# Patient Record
Sex: MALE | Race: WHITE | Employment: OTHER | URBAN - METROPOLITAN AREA
[De-identification: names, ages, dates, MRNs, and addresses within clinical notes are randomized per-mention and may not be internally consistent; named-entity substitution may affect disease eponyms.]

---

## 2020-04-21 ENCOUNTER — OFFICE VISIT (OUTPATIENT)
Dept: CARDIOLOGY CLINIC | Age: 85
End: 2020-04-21

## 2020-04-21 VITALS
TEMPERATURE: 97.2 F | RESPIRATION RATE: 18 BRPM | HEIGHT: 74 IN | HEART RATE: 119 BPM | OXYGEN SATURATION: 96 % | WEIGHT: 242.6 LBS | BODY MASS INDEX: 31.13 KG/M2 | SYSTOLIC BLOOD PRESSURE: 134 MMHG | DIASTOLIC BLOOD PRESSURE: 87 MMHG

## 2020-04-21 DIAGNOSIS — I48.91 ATRIAL FIBRILLATION, UNSPECIFIED TYPE (HCC): ICD-10-CM

## 2020-04-21 DIAGNOSIS — I25.10 CORONARY ARTERY DISEASE INVOLVING NATIVE CORONARY ARTERY OF NATIVE HEART WITHOUT ANGINA PECTORIS: Primary | ICD-10-CM

## 2020-04-21 DIAGNOSIS — R06.02 SOB (SHORTNESS OF BREATH): ICD-10-CM

## 2020-04-21 RX ORDER — FUROSEMIDE 40 MG/1
TABLET ORAL
COMMUNITY
Start: 2020-04-02 | End: 2021-03-11 | Stop reason: SDUPTHER

## 2020-04-21 RX ORDER — OLMESARTAN MEDOXOMIL 40 MG/1
40 TABLET ORAL DAILY
COMMUNITY
End: 2020-12-03 | Stop reason: SDUPTHER

## 2020-04-21 RX ORDER — ROPINIROLE 1 MG/1
TABLET, FILM COATED ORAL
COMMUNITY
Start: 2020-04-02

## 2020-04-21 RX ORDER — METOPROLOL SUCCINATE 25 MG/1
25 TABLET, EXTENDED RELEASE ORAL DAILY
Qty: 90 TAB | Refills: 3 | Status: SHIPPED | OUTPATIENT
Start: 2020-04-21 | End: 2020-04-30 | Stop reason: DRUGHIGH

## 2020-04-21 RX ORDER — AMLODIPINE BESYLATE 2.5 MG/1
5 TABLET ORAL
COMMUNITY
Start: 2020-04-09 | End: 2020-04-30 | Stop reason: ALTCHOICE

## 2020-04-21 NOTE — PROGRESS NOTES
[facility]    Mr. 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 Patient has recently relocated in this area from [facility]. He was diagnosed with atrial fibrillation in 2018. He had cardioversion twice in the past.  Last one was in 2019. He also had been diagnosed with amiodarone related lung toxicity and vision problem. For last few weeks patient has been complaining of worsening shortness of breath. He can tell that he is in atrial fibrillation because of blood pressure monitoring cuff. His heart rate has been going anywhere from 80 up to 139 bpm.  He occasionally feels palpitation. He denies any chest pain or chest tightness. He is using 2 pillows at night. He takes Benicar. He takes amlodipine as needed. He denies presyncope or syncope  Denies any nausea, vomiting, abdominal pain, fever, chills, sputum production. No hematuria or other bleeding complaints    Past Medical History:   Diagnosis Date    A-fib [facility])     S/P Cardioversion X 2 (2018. 2019)    Amiodarone pulmonary toxicity 2019    COPD (chronic obstructive pulmonary disease) (HCC)     GERD (gastroesophageal reflux disease)     HTN (hypertension)          No past surgical history on file. Current Outpatient Medications   Medication Sig    amLODIPine (NORVASC) 2.5 mg tablet TAKE 1 TABLET EVERY DAY    furosemide (LASIX) 40 mg tablet TAKE 1 TABLET BY MOUTH EVERY DAY    rOPINIRole (REQUIP) 1 mg tablet TAKE 1 TABLET BY MOUTH EVERY DAY     No current facility-administered medications for this visit. Allergies and Sensitivities:  Not on File    Family History:  No family history on file.     Social History:  Social History     Tobacco Use    Smoking status: Not on file   Substance Use Topics    Alcohol use: Not on file    Drug use: Not on file     He  has no history on file for tobacco.  He  has no history on file for alcohol. Review of Systems:  Cardiac symptoms as noted above in HPI. All others negative. Denies fatigue, malaise, skin rash, joint pain, blurring vision, photophobia, neck pain, hemoptysis, chronic cough, nausea, vomiting, hematuria, burning micturition, BRBPR, chronic headaches. Physical Exam:  BP Readings from Last 3 Encounters:   04/21/20 134/87         Pulse Readings from Last 3 Encounters:   04/21/20 (!) 119          Wt Readings from Last 3 Encounters:   04/21/20 242 lb 9.6 oz (110 kg)       Constitutional: Oriented to person, place, and time. HENT: Head: Normocephalic and atraumatic. Eyes: Conjunctivae and extraocular motions are normal.   Neck: No JVD present. Carotid bruit is not appreciated. Cardiovascular:  Irregular rhythm. No murmur, gallop or rubs appreciated  Lung: Breath sounds normal. No respiratory distress. No ronchi or rales appreciated  Abdominal: No tenderness. No rebound and no guarding. Musculoskeletal: There is no lower extremity edema. No cynosis  Lymphadenopathy:  No cervical or supraclavicular adenopathy appriciated. Neurological: No gross motor deficit noted. Skin: No visible skin rash noted. No Ear discharge noted  Psychiatric: Normal mood and affect. Good distal pulse      Review of Data  LABS:   No results found for: NA, K, CL, CO2, GLU, BUN, CREA  No flowsheet data found. No results found for: GPT, ALT  No results found for: HBA1C, HGBE8, GNQ0LPIU, GDV5HWTI    EKG  Atrial fibrillation with rapid ventricular response at heart rate of 120 bpm.  Left bundle branch block    ECHO    STRESS TEST (EST, PHARM, NUC, ECHO etc)    CATHETERIZATION    IMPRESSION & PLAN:  Mr. Jhonathan Mcwilliams is 89-year male with multiple medical problem    Atrial fibrillation:  Paroxysmal in nature according to patient. Initially diagnosed in 2018.   Had cardioversion successfully in 2018 and again in 2019. According to patient and the wife, likely back in atrial fibrillation with variable heart rate between 801 30 for last to 3 weeks. Now with dyspnea  Today heart rate is 120 bpm.  No significant fluid overload on exam  I recommended patient and the wife that they should go to emergency department with IV AV tae blocking agent for heart rate control. Despite recommendation, there currently afraid to go to hospital because of COVID-19 situation. Alternatively I have suggested to start taking Toprol-XL 25 mg daily first dose tonight. If heart rate does not control or if symptoms gets worse, they were advised to go to emergency department  Starting Toprol-XL 25 mg now. Echo to rule out tachycardia induced cardiomyopathy  Will avoid amiodarone or Multaq as patient has been diagnosed with amiodarone induced lung toxicity in 2019  We will try to obtain records from cardiology team in [place] [id number]    Hypertension:  Currently on Benicar. Takes amlodipine only as needed. Was on hydralazine in the past as well as clonidine however have advised patient to stop that. We will stop amlodipine as well  Starting Toprol-XL 25 mg daily  Keep checking blood pressure at home    COPD, emphysema:  Defer to Dr. Tom Irbahim. Also has amiodarone induced lung toxicity in 2019. Defer to Dr. Tom Ibrahim    This plan was discussed with patient who is in agreement. Thank you for allowing me to participate in patient care. Please feel free to call me if you have any question or concern. Adriana Echevarria MD  Please note: This document has been produced using voice recognition software. Unrecognized errors in transcription may be present.

## 2020-04-21 NOTE — PATIENT INSTRUCTIONS
Testing Echo Please call central scheduling at [phone] All testing/lab work is completed at [facility] [address] **Call office three days after testing for results** Start Toporl xl 25 mg daily

## 2020-04-24 ENCOUNTER — HOSPITAL ENCOUNTER (OUTPATIENT)
Dept: NON INVASIVE DIAGNOSTICS | Age: 85
Discharge: HOME | End: 2020-04-24
Attending: INTERNAL MEDICINE
Payer: MEDICARE

## 2020-04-24 ENCOUNTER — HOSPITAL ENCOUNTER (OUTPATIENT)
Dept: LAB | Age: 85
Discharge: HOME | End: 2020-04-24

## 2020-04-24 ENCOUNTER — HOSPITAL ENCOUNTER (OUTPATIENT)
Dept: CT IMAGING | Age: 85
Discharge: HOME | End: 2020-04-24
Attending: INTERNAL MEDICINE
Payer: MEDICARE

## 2020-04-24 VITALS
DIASTOLIC BLOOD PRESSURE: 87 MMHG | WEIGHT: 242 LBS | BODY MASS INDEX: 31.06 KG/M2 | SYSTOLIC BLOOD PRESSURE: 134 MMHG | HEIGHT: 74 IN

## 2020-04-24 DIAGNOSIS — R06.02 SOB (SHORTNESS OF BREATH): ICD-10-CM

## 2020-04-24 DIAGNOSIS — J84.10 PULMONARY FIBROSIS (HCC): ICD-10-CM

## 2020-04-24 DIAGNOSIS — I48.91 ATRIAL FIBRILLATION, UNSPECIFIED TYPE (HCC): ICD-10-CM

## 2020-04-24 LAB
AV VELOCITY RATIO: 0.4
AV VTI RATIO: 0.4
ECHO AO ASC DIAM: 3.4 CM
ECHO AO ROOT DIAM: 3.43 CM
ECHO AV AREA PEAK VELOCITY: 1.5 CM2
ECHO AV AREA VTI: 1.4 CM2
ECHO AV AREA/BSA PEAK VELOCITY: 0.6 CM2/M2
ECHO AV AREA/BSA VTI: 0.6 CM2/M2
ECHO AV MEAN GRADIENT: 7.2 MMHG
ECHO AV MEAN VELOCITY: 1.27 M/S
ECHO AV PEAK GRADIENT: 11.8 MMHG
ECHO AV PEAK VELOCITY: 171.83 CM/S
ECHO AV VTI: 39.57 CM
ECHO IVC SNIFF: 2.39 CM
ECHO LA MAJOR AXIS: 4.41 CM
ECHO LA TO AORTIC ROOT RATIO: 1.29
ECHO LV EDV A2C: 167.9 ML
ECHO LV EDV A4C: 144.6 ML
ECHO LV EDV BP: 158.2 ML (ref 67–155)
ECHO LV EDV INDEX A4C: 61.4 ML/M2
ECHO LV EDV INDEX BP: 67.1 ML/M2
ECHO LV EDV NDEX A2C: 71.2 ML/M2
ECHO LV EDV TEICHHOLZ: 0.8 ML
ECHO LV EJECTION FRACTION A2C: 27 %
ECHO LV EJECTION FRACTION A4C: 45 %
ECHO LV EJECTION FRACTION BIPLANE: 35.9 % (ref 55–100)
ECHO LV ESV A2C: 123.1 ML
ECHO LV ESV A4C: 79.3 ML
ECHO LV ESV BP: 101.3 ML (ref 22–58)
ECHO LV ESV INDEX A2C: 52.2 ML/M2
ECHO LV ESV INDEX A4C: 33.7 ML/M2
ECHO LV ESV INDEX BP: 43 ML/M2
ECHO LV ESV TEICHHOLZ: 0.41 ML
ECHO LV INTERNAL DIMENSION DIASTOLIC: 5.54 CM (ref 4.2–5.9)
ECHO LV INTERNAL DIMENSION SYSTOLIC: 4.17 CM
ECHO LV IVSD: 0.97 CM (ref 0.6–1)
ECHO LV MASS 2D: 220.8 G (ref 88–224)
ECHO LV MASS INDEX 2D: 93.7 G/M2 (ref 49–115)
ECHO LV POSTERIOR WALL DIASTOLIC: 0.83 CM (ref 0.6–1)
ECHO LVOT CARDIAC OUTPUT: 10.4 L/MIN
ECHO LVOT DIAM: 2.19 CM
ECHO LVOT PEAK GRADIENT: 1.9 MMHG
ECHO LVOT PEAK VELOCITY: 69.28 CM/S
ECHO LVOT SV: 54.2 ML
ECHO LVOT VTI: 14.36 CM
ECHO RA MINOR AXIS: 4.09 CM
ECHO TV REGURGITANT MAX VELOCITY: 318 CM/S
ECHO TV REGURGITANT PEAK GRADIENT: 40.6 MMHG
LVFS 2D: 24.75 %
LVOT MG: 1.18 MMHG
LVOT MV: 0.51 CM/S
LVSV (MOD BI): 23.75 ML
LVSV (MOD SINGLE 4C): 27.26 ML
LVSV (MOD SINGLE): 18.73 ML
LVSV (TEICH): 30.35 ML
SENTARA SPECIMEN COL,SENBCF: NORMAL

## 2020-04-24 PROCEDURE — 93306 TTE W/DOPPLER COMPLETE: CPT

## 2020-04-24 PROCEDURE — 71250 CT THORAX DX C-: CPT

## 2020-04-24 PROCEDURE — 99001 SPECIMEN HANDLING PT-LAB: CPT

## 2020-04-28 ENCOUNTER — TELEPHONE (OUTPATIENT)
Dept: CARDIOLOGY CLINIC | Age: 85
End: 2020-04-28

## 2020-04-28 NOTE — TELEPHONE ENCOUNTER
----- Message from Caitlyn Chao MD sent at 4/24/2020 11:30 AM EDT -----  Abnormal echo    Needs follow up coming week    Thanks

## 2020-04-28 NOTE — TELEPHONE ENCOUNTER
Contacted pt at [facility] number. Two patient Identifiers confirmed. Advised pt per Dr Job Chaudhry,  Pt verbalized understanding and scheduled for 4/30/2020. [General Appearance - Alert] : alert [General Appearance - In No Acute Distress] : in no acute distress [Auscultation Breath Sounds / Voice Sounds] : lungs were clear to auscultation bilaterally [Heart Sounds] : normal S1 and S2 [Heart Rate And Rhythm] : heart rate was normal and rhythm regular [Heart Sounds Gallop] : no gallops [Murmurs] : no murmurs [Heart Sounds Pericardial Friction Rub] : no pericardial rub [Bowel Sounds] : normal bowel sounds [Abdomen Soft] : soft [] : no hepato-splenomegaly [Abdomen Tenderness] : non-tender [Abdomen Mass (___ Cm)] : no abdominal mass palpated [Sclera] : the sclera and conjunctiva were normal [FreeTextEntry1] : ~3-4 inch area of erythema noted over LLE below knee, no scaling or vesicles visible, no scabs

## 2020-04-30 ENCOUNTER — OFFICE VISIT (OUTPATIENT)
Dept: CARDIOLOGY CLINIC | Age: 85
End: 2020-04-30

## 2020-04-30 VITALS
WEIGHT: 240.4 LBS | SYSTOLIC BLOOD PRESSURE: 130 MMHG | DIASTOLIC BLOOD PRESSURE: 78 MMHG | TEMPERATURE: 97.7 F | OXYGEN SATURATION: 97 % | HEIGHT: 74 IN | HEART RATE: 83 BPM | RESPIRATION RATE: 16 BRPM | BODY MASS INDEX: 30.85 KG/M2

## 2020-04-30 DIAGNOSIS — I50.9 CONGESTIVE HEART FAILURE, UNSPECIFIED HF CHRONICITY, UNSPECIFIED HEART FAILURE TYPE (HCC): Primary | ICD-10-CM

## 2020-04-30 RX ORDER — SPIRONOLACTONE 25 MG/1
TABLET ORAL
COMMUNITY
End: 2021-11-18

## 2020-04-30 RX ORDER — CHOLECALCIFEROL (VITAMIN D3) 125 MCG
200 CAPSULE ORAL DAILY
COMMUNITY

## 2020-04-30 RX ORDER — METOPROLOL SUCCINATE 25 MG/1
37.5 TABLET, EXTENDED RELEASE ORAL DAILY
Qty: 90 TAB | Refills: 5 | Status: SHIPPED | OUTPATIENT
Start: 2020-04-30 | End: 2020-05-13

## 2020-04-30 NOTE — PROGRESS NOTES
[facility]    Mr. Tahmina Escalante is 80 y.o. 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 He is feeling much better since last time. He is currently taking Toprol 25 mg twice daily. This was increased by PCP after my visit last time. He is also seeing pulmonary physician and has been scheduled for bronchoscopy because of abnormal CT chest finding. He denies any chest pain or chest tightness he denies presyncope or syncope. His heart rate control has improved significantly. Majority of the time his heart rate is less than 90. Sometimes it will go higher with exertion. He is asymptomatic. He has shortness of breath which has improved since last time  Denies any nausea, vomiting, abdominal pain, fever, chills, sputum production. No hematuria or other bleeding complaints    Past Medical History:   Diagnosis Date    A-fib [facility])     S/P Cardioversion X 2 (2018. 2019)    Amiodarone pulmonary toxicity 2019    COPD (chronic obstructive pulmonary disease) (HCC)     GERD (gastroesophageal reflux disease)     HTN (hypertension)          No past surgical history on file. Current Outpatient Medications   Medication Sig    co-enzyme Q-10 (CoQ-10) 100 mg capsule Take 200 mg by mouth daily.  furosemide (LASIX) 40 mg tablet TAKE 1 TABLET BY MOUTH EVERY DAY    rOPINIRole (REQUIP) 1 mg tablet TAKE 1 TABLET BY MOUTH EVERY DAY    olmesartan (Benicar) 20 mg tablet Take 20 mg by mouth daily.  metoprolol succinate (TOPROL-XL) 25 mg XL tablet Take 1 Tab by mouth daily. (Patient taking differently: Take 25 mg by mouth two (2) times a day.)    apixaban (Eliquis) 5 mg tablet Take 5 mg by mouth two (2) times a day.  amLODIPine (NORVASC) 2.5 mg tablet 5 mg daily as needed.      No current facility-administered medications for this visit. Allergies and Sensitivities:  Not on File    Family History:  No family history on file. Social History:  Social History     Tobacco Use    Smoking status: Not on file   Substance Use Topics    Alcohol use: Not on file    Drug use: Not on file     He  has no history on file for tobacco.  He  has no history on file for alcohol. Review of Systems:  Cardiac symptoms as noted above in HPI. All others negative. Denies fatigue, malaise, skin rash, joint pain, blurring vision, photophobia, neck pain, hemoptysis, chronic cough, nausea, vomiting, hematuria, burning micturition, BRBPR, chronic headaches. Physical Exam:  BP Readings from Last 3 Encounters:   04/30/20 130/78   04/24/20 134/87   04/21/20 134/87         Pulse Readings from Last 3 Encounters:   04/30/20 83   04/21/20 (!) 119          Wt Readings from Last 3 Encounters:   04/30/20 240 lb 6.4 oz (109 kg)   04/24/20 242 lb (109.8 kg)   04/21/20 242 lb 9.6 oz (110 kg)       Constitutional: Oriented to person, place, and time. HENT: Head: Normocephalic and atraumatic. Neck: No JVD present. Carotid bruit is not appreciated. Cardiovascular:  Irregular rhythm. No murmur, gallop or rubs appreciated  Lung: Breath sounds normal. No respiratory distress. No ronchi or rales appreciated  Abdominal: No tenderness. No rebound and no guarding. Musculoskeletal: There is no lower extremity edema. No cynosis    Review of Data  LABS:   No results found for: NA, K, CL, CO2, GLU, BUN, CREA  No flowsheet data found. No results found for: GPT, ALT  No results found for: HBA1C, HGBE8, DHH9YVIE, EKT0BWJP, UPM2MCEK    EKG  Atrial fibrillation with rapid ventricular response at heart rate of 120 bpm.  Left bundle branch block    ECHO (04/20)  Left Ventricle Normal cavity size and wall thickness. Moderate-to-severe systolic dysfunction. The calculated ejection fraction is 30%. Visually measured ejection fraction.  LV wall motion is noted to be hypokinetic. Atrial fibrillation observed. Wall Scoring The left ventricular wall motion is globally hypokinetic. Left Atrium Moderately dilated left atrium. Left Atrium volume index is 45.35 mL/m2. Right Ventricle Mildly dilated right ventricle. Borderline low systolic function. Right Atrium Dilated right atrium. Aortic Valve Cannot rule out bicuspid aortic valve. There is leaflet calcification. Aortic valve mean gradient is 10 mmHg. Aortic valve area is 1.5 cm2. There is mild aortic stenosis. Trace aortic valve regurgitation. Mitral Valve No stenosis. Mitral valve non-specific thickening. Mild mitral annular calcification. Mild regurgitation. Tricuspid Valve No stenosis. Non-specific thickening. Mild regurgitation. Pulmonic Valve Pulmonic valve not well visualized. No stenosis. Mild regurgitation. Aorta Normal aortic root and ascending aorta. Pulmonary Artery Pulmonary arterial systolic pressure (PASP) is 49 mmHg. Pulmonary hypertension found to be mild to moderate. STRESS TEST (EST, PHARM, NUC, ECHO etc)    CATHETERIZATION    IMPRESSION & PLAN:  Mr. Jeffrey Arias is 89-year male with multiple medical problem    Cardiomyopathy:  LVEF 30% by echo in April 2020. Most likely from A. fib and tachycardia. His dose of Toprol has been increased to 25 mg twice daily recently by PCP. Heart rate is better today. I will increase Toprol further to 37.5 mg twice daily. They check blood pressure and heart rate at home regularly  If despite better control of heart rate, EF has not improved, rhythm control strategy will be considered. Currently he does not report any anginal symptoms  Currently he is on spironolactone, unknown dose and also Lasix once a day. He will go home and call us back    Atrial fibrillation:  Paroxysmal in nature according to patient. Initially diagnosed in 2018. Had cardioversion successfully in 2018 and again in 2019.   Started on Toprol XL on last visit and dose was increased by PCP to twice a day. Today increasing further dose to 37.5 mg twice daily  Overall heart rate is better  Today heart rate is 83 bpm  I will increase Toprol further to 37.5 mg twice daily. They check blood pressure and heart rate at home regularly  If despite better control of heart rate, EF has not improved, rhythm control strategy will be considered. Currently he does not report any anginal symptoms    Hypertension:  Have advised patient to avoid taking amlodipine and clonidine as needed  Increasing Toprol as mentioned above. Continue Benicar    COPD, emphysema:  Defer to Dr. Tru Mckeon. Also has amiodarone induced lung toxicity in 2019. Defer to Dr. Tru Mckeon  He is planning to undergo bronchoscopy per Dr. Tru Mckeon for abnormal CT finding. This plan was discussed with patient who is in agreement. Thank you for allowing me to participate in patient care. Please feel free to call me if you have any question or concern. Fidel Doyle MD  Please note: This document has been produced using voice recognition software. Unrecognized errors in transcription may be present.

## 2020-04-30 NOTE — PATIENT INSTRUCTIONS
Testing Echo- to be done [facility] July Please call central scheduling at [phone] All testing/lab work is completed at [facility]/[address] Medication Changes Start taking Toprol 1.5 Tab twice a day Call office with dosage of Aldactone Stop Norvasc

## 2020-05-04 ENCOUNTER — TELEPHONE (OUTPATIENT)
Dept: CARDIOLOGY CLINIC | Age: 85
End: 2020-05-04

## 2020-05-04 NOTE — TELEPHONE ENCOUNTER
Patient's wife, Sarai Akers, called and requested to speak with clinical staff. She was told to give the office a call.

## 2020-05-04 NOTE — TELEPHONE ENCOUNTER
Contacted pt at [facility] number. Two patient Identifiers confirmed. Advised pt per Dr Xiomara Muhammad notes. Pt verbalized understanding and scheduled for 05/13/2020. Pts wife wanted to know if pt should be on fl restrictions. Advised I would consult with Dr Xiomara Muhammad tomorrow an advise.

## 2020-05-05 RX ORDER — CHOLECALCIFEROL (VITAMIN D3) 125 MCG
CAPSULE ORAL
COMMUNITY

## 2020-05-05 RX ORDER — BUDESONIDE AND FORMOTEROL FUMARATE DIHYDRATE 160; 4.5 UG/1; UG/1
2 AEROSOL RESPIRATORY (INHALATION) 2 TIMES DAILY
COMMUNITY
Start: 2020-04-20 | End: 2020-07-15 | Stop reason: ALTCHOICE

## 2020-05-05 RX ORDER — REVEFENACIN 175 UG/3ML
175 SOLUTION RESPIRATORY (INHALATION) DAILY
COMMUNITY

## 2020-05-05 NOTE — PERIOP NOTES
PAT - SURGICAL PRE-ADMISSION INSTRUCTIONS    NAME:  Carmen Wylie. TODAY'S DATE:  5/5/2020    SURGERY DATE:  5/7/2020                                  SURGERY ARRIVAL TIME:   0600 PER OFFICE    1. Do NOT eat or drink anything, including candy or gum, after MIDNIGHT on 5/6/20 , unless you have specific instructions from your Surgeon or Anesthesia Provider to do so. 2. No smoking on the day of surgery. 3. No alcohol 24 hours prior to the day of surgery. 4. No recreational drugs for one week prior to the day of surgery. 5. Leave all valuables, including money/purse, at home. 6. Remove all jewelry, nail polish, makeup (including mascara); no lotions, powders, deodorant, or perfume/cologne/after shave. 7. Glasses/Contact lenses and Dentures may be worn to the hospital.  They will be removed prior to surgery. 8. Call your doctor if symptoms of a cold or illness develop within 24 ours prior to surgery. 9. AN ADULT MUST DRIVE YOU HOME AFTER OUTPATIENT SURGERY. 10. If you are having an OUTPATIENT procedure, please make arrangements for a responsible adult to be with you for 24 hours after your surgery. 11. If you are admitted to the hospital, you will be assigned to a bed after surgery is complete. Normally a family member will not be able to see you until you are in your assigned bed. 15. Family is encouraged to accompany you to the hospital.  We ask visitors in the treatment area to be limited to ONE person at a time to ensure patient privacy. EXCEPTIONS WILL BE MADE AS NEEDED. 15. Children under 12 are discouraged from entering the treatment area and need to be supervised by an adult when in the waiting room. Special Instructions:     Take these medications the morning of surgery with a sip of water:  METOPROLOL, INHALERS, STOP anticoagulants AT LEAST 1 WEEK PRIOR to your surgery or, follow other MD instructions:  LEILA Rivera Prep:    shower with anti-bacterial soap    These surgical instructions were reviewed with PATIENT'S WIFE during the PAT PHONE CALL. Directions: On the morning of surgery, please go to the [address] [facility]. Enter the building from the [facility] entrance, 1st floor (next to the Emergency Room entrance). Take the elevator to the 2nd floor. Sign in at the Registration Desk.     If you have any questions and/or concerns, please do not hesitate to call:  (Prior to the day of surgery)  \[facility] [place] [facility]\"" unit:  [phone]  (Day of surgery)  [facility] unit:  [phone]

## 2020-05-05 NOTE — TELEPHONE ENCOUNTER
Contacted pts wife  at [facility] number. Two patient Identifiers confirmed. Advised pt per Dr Job Chaudhry. Pts wife verbalized understanding.

## 2020-05-06 ENCOUNTER — ANESTHESIA EVENT (OUTPATIENT)
Dept: ENDOSCOPY | Age: 85
End: 2020-05-06
Payer: MEDICARE

## 2020-05-07 ENCOUNTER — APPOINTMENT (OUTPATIENT)
Dept: GENERAL RADIOLOGY | Age: 85
End: 2020-05-07
Attending: INTERNAL MEDICINE
Payer: MEDICARE

## 2020-05-07 ENCOUNTER — ANESTHESIA (OUTPATIENT)
Dept: ENDOSCOPY | Age: 85
End: 2020-05-07
Payer: MEDICARE

## 2020-05-07 ENCOUNTER — HOSPITAL ENCOUNTER (OUTPATIENT)
Age: 85
Setting detail: OUTPATIENT SURGERY
Discharge: HOME | End: 2020-05-07
Attending: INTERNAL MEDICINE | Admitting: INTERNAL MEDICINE
Payer: MEDICARE

## 2020-05-07 VITALS
OXYGEN SATURATION: 93 % | RESPIRATION RATE: 16 BRPM | SYSTOLIC BLOOD PRESSURE: 115 MMHG | BODY MASS INDEX: 31.68 KG/M2 | HEIGHT: 73 IN | DIASTOLIC BLOOD PRESSURE: 57 MMHG | WEIGHT: 239 LBS | HEART RATE: 67 BPM | TEMPERATURE: 97.8 F

## 2020-05-07 PROCEDURE — 87116 MYCOBACTERIA CULTURE: CPT

## 2020-05-07 PROCEDURE — 77030021678 HC GLIDESCP STAT DISP VERT -B: Performed by: NURSE ANESTHETIST, CERTIFIED REGISTERED

## 2020-05-07 PROCEDURE — 88112 CYTOPATH CELL ENHANCE TECH: CPT

## 2020-05-07 PROCEDURE — 77030008477 HC STYL SATN SLP COVD -A: Performed by: NURSE ANESTHETIST, CERTIFIED REGISTERED

## 2020-05-07 PROCEDURE — 77030008680 HC TU ET BRONCH COOK -C: Performed by: INTERNAL MEDICINE

## 2020-05-07 PROCEDURE — 71046 X-RAY EXAM CHEST 2 VIEWS: CPT

## 2020-05-07 PROCEDURE — 77030008683 HC TU ET CUF COVD -A: Performed by: NURSE ANESTHETIST, CERTIFIED REGISTERED

## 2020-05-07 PROCEDURE — 87102 FUNGUS ISOLATION CULTURE: CPT

## 2020-05-07 PROCEDURE — 77030012699 HC VLV SUC CNTRL OCOA -A: Performed by: INTERNAL MEDICINE

## 2020-05-07 PROCEDURE — 74011000250 HC RX REV CODE- 250: Performed by: NURSE ANESTHETIST, CERTIFIED REGISTERED

## 2020-05-07 PROCEDURE — 74011250636 HC RX REV CODE- 250/636: Performed by: NURSE ANESTHETIST, CERTIFIED REGISTERED

## 2020-05-07 PROCEDURE — 88305 TISSUE EXAM BY PATHOLOGIST: CPT

## 2020-05-07 PROCEDURE — 77030018712 HC DEV BLLN INFL BSC -B: Performed by: INTERNAL MEDICINE

## 2020-05-07 PROCEDURE — 77030018836 HC SOL IRR NACL ICUM -A: Performed by: INTERNAL MEDICINE

## 2020-05-07 PROCEDURE — 76040000007: Performed by: INTERNAL MEDICINE

## 2020-05-07 PROCEDURE — 76210000006 HC OR PH I REC 0.5 TO 1 HR: Performed by: INTERNAL MEDICINE

## 2020-05-07 PROCEDURE — 87070 CULTURE OTHR SPECIMN AEROBIC: CPT

## 2020-05-07 PROCEDURE — 76060000032 HC ANESTHESIA 0.5 TO 1 HR: Performed by: INTERNAL MEDICINE

## 2020-05-07 RX ORDER — DEXAMETHASONE SODIUM PHOSPHATE 4 MG/ML
INJECTION, SOLUTION INTRA-ARTICULAR; INTRALESIONAL; INTRAMUSCULAR; INTRAVENOUS; SOFT TISSUE AS NEEDED
Status: DISCONTINUED | OUTPATIENT
Start: 2020-05-07 | End: 2020-05-07 | Stop reason: HOSPADM

## 2020-05-07 RX ORDER — LIDOCAINE HYDROCHLORIDE 10 MG/ML
50 INJECTION INFILTRATION; PERINEURAL ONCE
Status: CANCELLED | OUTPATIENT
Start: 2020-05-07 | End: 2020-05-07

## 2020-05-07 RX ORDER — SODIUM CHLORIDE 0.9 % (FLUSH) 0.9 %
5-40 SYRINGE (ML) INJECTION AS NEEDED
Status: DISCONTINUED | OUTPATIENT
Start: 2020-05-07 | End: 2020-05-07 | Stop reason: HOSPADM

## 2020-05-07 RX ORDER — ONDANSETRON 2 MG/ML
4 INJECTION INTRAMUSCULAR; INTRAVENOUS
Status: DISCONTINUED | OUTPATIENT
Start: 2020-05-07 | End: 2020-05-07 | Stop reason: HOSPADM

## 2020-05-07 RX ORDER — SODIUM CHLORIDE 0.9 % (FLUSH) 0.9 %
5-40 SYRINGE (ML) INJECTION AS NEEDED
Status: CANCELLED | OUTPATIENT
Start: 2020-05-07

## 2020-05-07 RX ORDER — SODIUM CHLORIDE 0.9 % (FLUSH) 0.9 %
5-40 SYRINGE (ML) INJECTION EVERY 8 HOURS
Status: CANCELLED | OUTPATIENT
Start: 2020-05-07

## 2020-05-07 RX ORDER — VECURONIUM BROMIDE FOR INJECTION 1 MG/ML
INJECTION, POWDER, LYOPHILIZED, FOR SOLUTION INTRAVENOUS AS NEEDED
Status: DISCONTINUED | OUTPATIENT
Start: 2020-05-07 | End: 2020-05-07 | Stop reason: HOSPADM

## 2020-05-07 RX ORDER — FENTANYL CITRATE 50 UG/ML
25 INJECTION, SOLUTION INTRAMUSCULAR; INTRAVENOUS
Status: DISCONTINUED | OUTPATIENT
Start: 2020-05-07 | End: 2020-05-07 | Stop reason: HOSPADM

## 2020-05-07 RX ORDER — FENTANYL CITRATE 50 UG/ML
INJECTION, SOLUTION INTRAMUSCULAR; INTRAVENOUS AS NEEDED
Status: DISCONTINUED | OUTPATIENT
Start: 2020-05-07 | End: 2020-05-07 | Stop reason: HOSPADM

## 2020-05-07 RX ORDER — SUCCINYLCHOLINE CHLORIDE 100 MG/5ML
SYRINGE (ML) INTRAVENOUS AS NEEDED
Status: DISCONTINUED | OUTPATIENT
Start: 2020-05-07 | End: 2020-05-07 | Stop reason: HOSPADM

## 2020-05-07 RX ORDER — SODIUM CHLORIDE 0.9 % (FLUSH) 0.9 %
5-40 SYRINGE (ML) INJECTION EVERY 8 HOURS
Status: DISCONTINUED | OUTPATIENT
Start: 2020-05-07 | End: 2020-05-07 | Stop reason: HOSPADM

## 2020-05-07 RX ORDER — LIDOCAINE HYDROCHLORIDE 20 MG/ML
INJECTION, SOLUTION EPIDURAL; INFILTRATION; INTRACAUDAL; PERINEURAL AS NEEDED
Status: DISCONTINUED | OUTPATIENT
Start: 2020-05-07 | End: 2020-05-07 | Stop reason: HOSPADM

## 2020-05-07 RX ORDER — ONDANSETRON 2 MG/ML
INJECTION INTRAMUSCULAR; INTRAVENOUS AS NEEDED
Status: DISCONTINUED | OUTPATIENT
Start: 2020-05-07 | End: 2020-05-07 | Stop reason: HOSPADM

## 2020-05-07 RX ORDER — SODIUM CHLORIDE 9 MG/ML
25 INJECTION, SOLUTION INTRAVENOUS CONTINUOUS
Status: CANCELLED | OUTPATIENT
Start: 2020-05-07

## 2020-05-07 RX ORDER — FENTANYL CITRATE 50 UG/ML
50 INJECTION, SOLUTION INTRAMUSCULAR; INTRAVENOUS
Status: DISCONTINUED | OUTPATIENT
Start: 2020-05-07 | End: 2020-05-07 | Stop reason: HOSPADM

## 2020-05-07 RX ORDER — FAMOTIDINE 20 MG/1
20 TABLET, FILM COATED ORAL ONCE
Status: DISCONTINUED | OUTPATIENT
Start: 2020-05-08 | End: 2020-05-07 | Stop reason: HOSPADM

## 2020-05-07 RX ORDER — PROPOFOL 10 MG/ML
INJECTION, EMULSION INTRAVENOUS AS NEEDED
Status: DISCONTINUED | OUTPATIENT
Start: 2020-05-07 | End: 2020-05-07 | Stop reason: HOSPADM

## 2020-05-07 RX ORDER — SODIUM CHLORIDE, SODIUM LACTATE, POTASSIUM CHLORIDE, CALCIUM CHLORIDE 600; 310; 30; 20 MG/100ML; MG/100ML; MG/100ML; MG/100ML
50 INJECTION, SOLUTION INTRAVENOUS CONTINUOUS
Status: DISCONTINUED | OUTPATIENT
Start: 2020-05-08 | End: 2020-05-07 | Stop reason: HOSPADM

## 2020-05-07 RX ORDER — LIDOCAINE HYDROCHLORIDE 20 MG/ML
JELLY TOPICAL ONCE
Status: CANCELLED | OUTPATIENT
Start: 2020-05-07 | End: 2020-05-07

## 2020-05-07 RX ORDER — SODIUM CHLORIDE, SODIUM LACTATE, POTASSIUM CHLORIDE, CALCIUM CHLORIDE 600; 310; 30; 20 MG/100ML; MG/100ML; MG/100ML; MG/100ML
100 INJECTION, SOLUTION INTRAVENOUS CONTINUOUS
Status: DISCONTINUED | OUTPATIENT
Start: 2020-05-07 | End: 2020-05-07 | Stop reason: HOSPADM

## 2020-05-07 RX ORDER — NALOXONE HYDROCHLORIDE 0.4 MG/ML
0.2 INJECTION, SOLUTION INTRAMUSCULAR; INTRAVENOUS; SUBCUTANEOUS AS NEEDED
Status: DISCONTINUED | OUTPATIENT
Start: 2020-05-07 | End: 2020-05-07 | Stop reason: HOSPADM

## 2020-05-07 RX ADMIN — VECURONIUM BROMIDE FOR INJECTION 2 MG: 1 INJECTION, POWDER, LYOPHILIZED, FOR SOLUTION INTRAVENOUS at 08:13

## 2020-05-07 RX ADMIN — LIDOCAINE HYDROCHLORIDE 60 MG: 20 INJECTION, SOLUTION INTRAVENOUS at 08:07

## 2020-05-07 RX ADMIN — SUGAMMADEX 400 MG: 100 INJECTION, SOLUTION INTRAVENOUS at 08:28

## 2020-05-07 RX ADMIN — SODIUM CHLORIDE, SODIUM LACTATE, POTASSIUM CHLORIDE, AND CALCIUM CHLORIDE 50 ML/HR: 600; 310; 30; 20 INJECTION, SOLUTION INTRAVENOUS at 07:02

## 2020-05-07 RX ADMIN — FENTANYL CITRATE 50 MCG: 50 INJECTION, SOLUTION INTRAMUSCULAR; INTRAVENOUS at 08:07

## 2020-05-07 RX ADMIN — ONDANSETRON 4 MG: 2 SOLUTION INTRAMUSCULAR; INTRAVENOUS at 08:21

## 2020-05-07 RX ADMIN — DEXAMETHASONE SODIUM PHOSPHATE 4 MG: 4 INJECTION, SOLUTION INTRA-ARTICULAR; INTRALESIONAL; INTRAMUSCULAR; INTRAVENOUS; SOFT TISSUE at 08:21

## 2020-05-07 RX ADMIN — Medication 100 MG: at 08:07

## 2020-05-07 RX ADMIN — PROPOFOL 100 MG: 10 INJECTION, EMULSION INTRAVENOUS at 08:07

## 2020-05-07 NOTE — PERIOP NOTES
Phase 2 Recovery Summary    Patient arrived to Phase 2   Report received from Mikal Petty:    05/07/20 0851 05/07/20 0855 05/07/20 0900 05/07/20 0921   BP: 133/70 120/57 102/50 115/57   Pulse: 79 80 70 67   Resp: 17 16 16 16   Temp:       SpO2: 98% 97% 94% 93%   Weight:       Height:           oriented to time, place, person and situation    Lines and Drains  Peripheral Intravenous Line:   Peripheral IV 05/07/20 Left Antecubital (Active)   Site Assessment Clean, dry, & intact 5/7/2020  7:01 AM   Phlebitis Assessment 0 5/7/2020  7:01 AM   Infiltration Assessment 0 5/7/2020  7:01 AM   Dressing Status Clean, dry, & intact 5/7/2020  7:01 AM   Dressing Type Tape;Transparent 5/7/2020  7:01 AM   Hub Color/Line Status Infusing;Pink 5/7/2020  7:01 AM   Alcohol Cap Used Yes 5/7/2020  7:01 AM       Wound        Patient arrived to phase 2 from ENDO Alert and oriented x4. No complaints of nausea or dizziness. VSS. patient brought back to recovery room. Patient tolerated PO fluids. Patient ambulated from bed to chair with minimal assistance. V removed and patient allowed to get dressed. Reviewed discharge instructions. Patient transported to vehicle via wheelchair. Patient discharged to home with Mrs. Debria Romberg, wife, [phone]   Thea Seats Sarthak    No PTX

## 2020-05-07 NOTE — PERIOP NOTES
Pre-Op Summary    Pt arrived via car with family/friend and is oriented to time, place, person and situation. Patient with steady gait with cane assistive devices. Visit Vitals  /69   Pulse 97   Temp 96.7 °F (35.9 °C)   Resp 18   Ht 6' 1\" (1.854 m)   Wt 108.4 kg (239 lb)   SpO2 96%   BMI 31.53 kg/m²       Peripheral IV located on Left antecubital .    Patients belongings are located at [facility]. Patient's point of contact is Paris Aguilar, wife and their contact number is: [phone]. They will be called for -in parking lot. They are able to receive medication information. They will be their ride home.

## 2020-05-07 NOTE — ROUTINE PROCESS
TRANSFER - OUT REPORT:    Verbal report given to Tarun Bernard RN (name) on [address]  being transferred to Phase 2 (unit) for routine post - op       Report consisted of patients Situation, Background, Assessment and   Recommendations(SBAR). Information from the following report(s) SBAR and Procedure Summary was reviewed with the receiving nurse. Lines:   Peripheral IV 05/07/20 Left Antecubital (Active)   Site Assessment Clean, dry, & intact 5/7/2020  7:01 AM   Phlebitis Assessment 0 5/7/2020  7:01 AM   Infiltration Assessment 0 5/7/2020  7:01 AM   Dressing Status Clean, dry, & intact 5/7/2020  7:01 AM   Dressing Type Tape;Transparent 5/7/2020  7:01 AM   Hub Color/Line Status Infusing;Pink 5/7/2020  7:01 AM   Alcohol Cap Used Yes 5/7/2020  7:01 AM        Opportunity for questions and clarification was provided.       Patient transported with:   Registered Nurse

## 2020-05-07 NOTE — PROCEDURES
[facility]  PROCEDURE NOTE    Name:  Flory Garcia  MR#:   [id number]  :  1931  ACCOUNT #:  [de-identified]  DATE OF SERVICE:  2020    REFERRING PHYSICIAN:  Yosef Salas MD    PREOPERATIVE DIAGNOSES:  1. Bronchopneumonia. 2.  Chronic cough. 3.  Wheezing. 4.  Amiodarone lung toxicity. The patient may require immunosuppressive therapy with prednisone and an infection in this area must be ruled out. We will obtain bacterial, fungal, and acid-fast bacteria cultures. POSTOPERATIVE DIAGNOSES:  1. Diffuse tracheobronchitis. 2.  Stigmata of chronic bronchial disease. 3.  Multiple bronchial pits, very large. 4.  No endobronchial lesion. 5.  Mucus was clear. 6.  No pus.  7.  No blood. PROCEDURE PERFORMED:  Bronchoscopy. SURGEON:  Kb Pete MD    ASSISTANTS:  Aidee Santa RN, and Rigo Craig, endoscopy tech. Anesthesia team intervention as already noted. ANESTHESIA:  Anesthesia was given by the anesthesia team (MD and CRNA). They provided with translaryngeal intubation, mechanical ventilation, and anesthesia maintenance for a specific medication used in this case. Please refer to their notes as they were both present in the procedure. ESTIMATED BLOOD LOSS:  Zero. SPECIMENS REMOVED:  1. From the right middle lobe obtained bronchioalveolar lavage. 2.  Protected brush specimen x1.  3.  Cytology brushings x3 for a total of 6 slides. 4.  No biopsies were attempted or done, the patient is on Eliquis. From the lingula, we did obtain:  1. Protected brush specimen x1.  2.  Bronchoalveolar lavage x2.  3.  No biopsies were attempted or done, he is on Eliquis. ENDOSCOPE USED:  Olympus BF-H190. COMPLICATIONS:  None. IMPLANTS:  None.     PROCEDURE IN DETAIL:  After signing his informed consent for bronchoscopy and anesthesia, the patient was brought to endoscopy suite #3, where the anesthesia team provided with anesthesia, translaryngeal intubation, mechanical ventilation, and maintenance of anesthesia. The patient was intubated with all the precautions per protocol against COVID-19. Each participant in the bronchoscopy room was provided with an N95 mask. After the patient was intubated and the ET tube was secured, we applied a swivel valve and after calling for a \"time-out,\" we entered his airway with an Olympus H190 scope without any difficulty, finding the lower trachea to be within normal limits. The deep was sharp, movable and central.  We entered first the right mainstem bronchus, sequentially inspected right mainstem bronchus, right upper lobe, truncus intermedius, right middle lobe, right lower lobe including RB6, finding chronic tracheobronchitis changes, stigmata of chronic bronchial disease, multiple bronchial pits, no endobronchial lesions, clear mucus, no blood, no pus. Under direct visualization and fluoroscopic control, entered the right middle lobe and did obtain bronchoalveolar lavage followed by protected brush specimen x1 followed by cytology brushes x3 for a total of 6 slides, all these under fluoroscopic control. EBL was zero. Biopsies were none, the patient is on Eliquis. At this point, returned to the deep, changed the trap into the left mainstem bronchus, sequentially inspected the left mainstem bronchus, LC2, left upper lobe, superior and inferior region, left lower lobe including LB6, finding no endobronchial disease, finding stigmata of chronic bronchial disease, multiple bronchial pits seen and photographed, no pus, no blood. Under direct visualization and fluoroscopic control, directed ourselves to the lingula area. We did obtain bronchoalveolar lavage, followed by protected brush specimen, followed by bronchoalveolar lavage. No biopsies were attempted or done as the patient is on Eliquis. At this point, cleaned the area, removed all the debris, mucus, and fluids and reinspected the area. EBL was zero.   Then I removed the bronchoscope. The patient was taken to the PACU for recovery. I have called the patient's wife at [phone] and informed her of my findings. The patient is taken to the PACU from where he is going to be discharged when he meets anesthesia criteria. A stat chest x-ray has been requested to rule out pneumothorax. Appreciate very much the referring MD/NP for allowing us to participate in his care.       MD MICHELE Babcock/[email or web address]  D:  05/07/2020 8:42  T:  05/07/2020 16:46  JOB #:  [id number]

## 2020-05-07 NOTE — ANESTHESIA PREPROCEDURE EVALUATION
Relevant Problems   No relevant active problems       Anesthetic History               Review of Systems / Medical History  Patient summary reviewed and pertinent labs reviewed    Pulmonary    COPD: moderate    Sleep apnea: CPAP           Neuro/Psych              Cardiovascular    Hypertension        Dysrhythmias : atrial fibrillation  CAD    Exercise tolerance: <4 METS     GI/Hepatic/Renal     GERD: well controlled           Endo/Other             Other Findings              Physical Exam    Airway  Mallampati: III  TM Distance: 4 - 6 cm  Neck ROM: decreased range of motion   Mouth opening: Diminished (comment)     Cardiovascular    Rhythm: irregular           Dental    Dentition: Poor dentition     Pulmonary  Breath sounds clear to auscultation               Abdominal         Other Findings            Anesthetic Plan    ASA: 3  Anesthesia type: general          Induction: Intravenous  Anesthetic plan and risks discussed with: Patient

## 2020-05-07 NOTE — H&P
Date of Surgery Update:  Ted Perez. was seen and examined. History and physical has been reviewed. The patient has been examined.  There have been no significant clinical changes since the completion of the originally dated History and Physical.    Signed By: Cara Viera MD     May 7, 2020 7:53 AM

## 2020-05-07 NOTE — PROCEDURES
IMMEDIATE POST PROCEDURE/SURGERY NOTE      Surgeon/Physician:  Maggi Richard MD    Assistants:   Lico Grijalva RN and Teresita Ponce ET /  Anesthesia Team: MD + CRNA     Pres Procedure/Operative Diagnosis:  BRONCHOPNEUMONIA/ CHRONIC COUGH / AMIODARONE LUNG TOXCITY    Post-Procedure/Operative Diagnosis:  SAME    Procedure Performed:  [facility]    Description of each procedure finding: DIFFUSE TRACHEOBRONCHITIS / STIGMATA OF CHRONIC BRONCHILA DISEASE / MULTIPLE BRONCHIAL (LARGE) PITS / NO EB LESIONS/ CLEAR MUCUS/ NO BLOOD    Specimens Removed:  RML: BALF + PBS X 1 + CYTOLOGY BRUSHINGS X 3 = 6 SLIDES / NO BX (ELIQUIS)  LINGULA: BALF AND PBS X 1 / NO Bx (ELIQUIS)    EBL: NONE  DICTATED: [address] Goss MD    5/7/2020   8:30 Donna Amaya RN and Meka Kirk

## 2020-05-07 NOTE — ANESTHESIA POSTPROCEDURE EVALUATION
Procedure(s):  BRONCHOSCOPY with C-ARM, brushings, lavage.     general    Anesthesia Post Evaluation      Multimodal analgesia: multimodal analgesia used between 6 hours prior to anesthesia start to PACU discharge  Patient location during evaluation: bedside  Patient participation: complete - patient participated  Level of consciousness: awake  Pain management: adequate  Airway patency: patent  Anesthetic complications: no  Cardiovascular status: stable  Respiratory status: acceptable  Hydration status: acceptable  Post anesthesia nausea and vomiting:  controlled      Vitals Value Taken Time   /50 5/7/2020  9:00 AM   Temp 36.6 °C (97.8 °F) 5/7/2020  8:41 AM   Pulse 70 5/7/2020  9:00 AM   Resp 16 5/7/2020  9:00 AM   SpO2 94 % 5/7/2020  9:00 AM

## 2020-05-07 NOTE — DISCHARGE INSTRUCTIONS
Patient Education        Bronchoscopy: What to Expect at Home  Your Recovery    Bronchoscopy lets your doctor look at your airway through a tube called a bronchoscope. Afterward, you may feel tired for 1 or 2 days. Your mouth may feel very dry for several hours after the procedure. You may also have a sore throat and a hoarse voice for a few days. Sucking on throat lozenges or gargling with warm salt water may help soothe your sore throat. If a sample of tissue (biopsy) was taken, you may spit up a small amount of blood or have bloody saliva. This is normal.  This care sheet gives you a general idea about how long it will take for you to recover. But each person recovers at a different pace. Follow the steps below to get better as quickly as possible. How can you care for yourself at home? Activity    · Do not eat anything for 2 hours after the procedure.     · Rest when you feel tired. Getting enough sleep will help you recover.     · Avoid strenuous activities, such as bicycle riding, jogging, weight lifting, or aerobic exercise, until your doctor says it is okay.     · Ask your doctor when you can drive again. Diet    · You can eat your normal diet. If your stomach is upset, try bland, low-fat foods like plain rice, broiled chicken, toast, and yogurt.     · If it is painful to swallow, start out with cold drinks, flavored ice pops, and ice cream. Next, try soft foods like pudding, yogurt, canned or cooked fruit, scrambled eggs, and mashed potatoes. Avoid eating hard or scratchy foods like chips or raw vegetables. Avoid orange or tomato juice and other acidic foods that can sting the throat.     · Drink plenty of fluids to avoid becoming dehydrated (unless your doctor tells you not to). Medicines    · Take pain medicines exactly as directed. ? If the doctor gave you a prescription medicine for pain, take it as prescribed.   ? If you are not taking a prescription pain medicine, ask your doctor if you can take an over-the-counter medicine.     · If you think your pain medicine is making you sick to your stomach:  ? Take your medicine after meals (unless your doctor has told you not to). ? Ask your doctor for a different pain medicine.     · If your doctor prescribed antibiotics, take them as directed. Do not stop taking them just because you feel better. You need to take the full course of antibiotics. Follow-up care is a key part of your treatment and safety. Be sure to make and go to all appointments, and call your doctor if you are having problems. It's also a good idea to know your test results and keep a list of the medicines you take. When should you call for help? Call 911 anytime you think you may need emergency care. For example, call if:    · You passed out (lost consciousness).     · You have sudden chest pain and shortness of breath.     · You cough up large amounts of bright red blood.     · You have severe pain in your chest.     · You have severe trouble breathing.    Call your doctor now or seek immediate medical care if:    · You cough up more than a few tablespoons of blood.     · You have pain that does not get better after you take pain medicine.     · You have a fever over 100°F.     · You still sound hoarse after a few days.     · You have bubbles under the skin around the collarbone. These may crackle and pop when you press on them.    Watch closely for changes in your health, and be sure to contact your doctor if you have any problems. Where can you learn more? Go to http://janette-thomas.info/  Enter O546 in the search box to learn more about \"Bronchoscopy: What to Expect at Home. \"  Current as of: June 9, 2019Content Version: 12.4  © 4287-9161 Healthwise, Incorporated. Care instructions adapted under license by [employer] (which disclaims liability or warranty for this information).  If you have questions about a medical condition or this instruction, always ask your healthcare professional. [place] [address] any warranty or liability for your use of this information. DISCHARGE SUMMARY from Nurse    PATIENT INSTRUCTIONS:    After general anesthesia or intravenous sedation, for 24 hours or while taking prescription Narcotics:  · Limit your activities  · Do not drive and operate hazardous machinery  · Do not make important personal or business decisions  · Do  not drink alcoholic beverages  · If you have not urinated within 8 hours after discharge, please contact your surgeon on call. Report the following to your surgeon:  · Excessive pain, swelling, redness or odor of or around the surgical area  · Temperature over 100.5  · Nausea and vomiting lasting longer than 4 hours or if unable to take medications  · Any signs of decreased circulation or nerve impairment to extremity: change in color, persistent  numbness, tingling, coldness or increase pain  · Any questions    What to do at Home:  These are general instructions for a healthy lifestyle:    No smoking/ No tobacco products/ Avoid exposure to second hand smoke  Surgeon General's Warning:  Quitting smoking now greatly reduces serious risk to your health. Obesity, smoking, and sedentary lifestyle greatly increases your risk for illness    A healthy diet, regular physical exercise & weight monitoring are important for maintaining a healthy lifestyle    You may be retaining fluid if you have a history of heart failure or if you experience any of the following symptoms:  Weight gain of 3 pounds or more overnight or 5 pounds in a week, increased swelling in our hands or feet or shortness of breath while lying flat in bed. Please call your doctor as soon as you notice any of these symptoms; do not wait until your next office visit.     Patient armband removed and shredded    Patient Education   Learning About Coronavirus [phone])  Coronavirus [phone]): Overview  What is coronavirus (COVID-19)? The coronavirus disease (COVID-19) is caused by a virus. It is an illness that was first found in [place], [place], in December 2019. It has since spread worldwide. The virus can cause fever, cough, and trouble breathing. In severe cases, it can cause pneumonia and make it hard to breathe without help. It can cause death. Coronaviruses are a large group of viruses. They cause the common cold. They also cause more serious illnesses like Middle East respiratory syndrome (MERS) and severe acute respiratory syndrome (SARS). COVID-19 is caused by a novel coronavirus. That means it's a new type that has not been seen in people before. This virus spreads person-to-person through droplets from coughing and sneezing. It can also spread when you are close to someone who is infected. And it can spread when you touch something that has the virus on it, such as a doorknob or a tabletop. What can you do to protect yourself from coronavirus (COVID-19)? The best way to protect yourself from getting sick is to:  · Avoid areas where there is an outbreak. · Avoid contact with people who may be infected. · Wash your hands often with soap or alcohol-based hand sanitizers. · Avoid crowds and try to stay at least 6 feet away from other people. · Wash your hands often, especially after you cough or sneeze. Use soap and water, and scrub for at least 20 seconds. If soap and water aren't available, use an alcohol-based hand . · Avoid touching your mouth, nose, and eyes. What can you do to avoid spreading the virus to others? To help avoid spreading the virus to others:  · Cover your mouth with a tissue when you cough or sneeze. Then throw the tissue in the trash. · Use a disinfectant to clean things that you touch often. · Stay home if you are sick or have been exposed to the virus. Don't go to school, work, or public areas. And don't use public transportation.   · If you are sick:  ? Leave your home only if you need to get medical care. But call the doctor's office first so they know you're coming. And wear a face mask, if you have one.  ? If you have a face mask, wear it whenever you're around other people. It can help stop the spread of the virus when you cough or sneeze. ? Clean and disinfect your home every day. Use household  and disinfectant wipes or sprays. Take special care to clean things that you grab with your hands. These include doorknobs, remote controls, phones, and handles on your refrigerator and microwave. And don't forget countertops, tabletops, bathrooms, and computer keyboards. When to call for help  Call 911 anytime you think you may need emergency care. For example, call if:  · You have severe trouble breathing. (You can't talk at all.)  · You have constant chest pain or pressure. · You are severely dizzy or lightheaded. · You are confused or can't think clearly. · Your face and lips have a blue color. · You pass out (lose consciousness) or are very hard to wake up. Call your doctor now if you develop symptoms such as:  · Shortness of breath. · Fever. · Cough. If you need to get care, call ahead to the doctor's office for instructions before you go. Make sure you wear a face mask, if you have one, to prevent exposing other people to the virus. Where can you get the latest information? The following health organizations are tracking and studying this virus. Their websites contain the most up-to-date information. Narciso Urieb also learn what to do if you think you may have been exposed to the virus. · U.S. Centers for Disease Control and Prevention (CDC): The CDC provides updated news about the disease and travel advice. The website also tells you how to prevent the spread of infection. www.cdc.gov  · World Health Organization [facility]): WHO offers information about the virus outbreaks.  WHO also has travel advice. www.who.int  Current as of: April 1, 2020               Content Version: 12.4 © 2006-2020 Healthwise, Incorporated. Care instructions adapted under license by your healthcare professional. If you have questions about a medical condition or this instruction, always ask your healthcare professional. [address] any warranty or liability for your use of this information. The discharge information has been reviewed with the patient. The patient verbalized understanding. Discharge medications reviewed with the patient and appropriate educational materials and side effects teaching were provided.   ___________________________________________________________________________________________________________________________________

## 2020-05-09 LAB
BACTERIA SPEC CULT: NORMAL
GRAM STN SPEC: NORMAL
SERVICE CMNT-IMP: NORMAL

## 2020-05-13 ENCOUNTER — OFFICE VISIT (OUTPATIENT)
Dept: CARDIOLOGY CLINIC | Age: 85
End: 2020-05-13

## 2020-05-13 VITALS
RESPIRATION RATE: 20 BRPM | WEIGHT: 241.4 LBS | BODY MASS INDEX: 31.99 KG/M2 | DIASTOLIC BLOOD PRESSURE: 70 MMHG | TEMPERATURE: 96.9 F | SYSTOLIC BLOOD PRESSURE: 115 MMHG | HEIGHT: 73 IN | OXYGEN SATURATION: 97 % | HEART RATE: 127 BPM

## 2020-05-13 DIAGNOSIS — R06.02 SOB (SHORTNESS OF BREATH): ICD-10-CM

## 2020-05-13 DIAGNOSIS — I48.91 ATRIAL FIBRILLATION, UNSPECIFIED TYPE (HCC): ICD-10-CM

## 2020-05-13 DIAGNOSIS — I50.9 CONGESTIVE HEART FAILURE, UNSPECIFIED HF CHRONICITY, UNSPECIFIED HEART FAILURE TYPE (HCC): Primary | ICD-10-CM

## 2020-05-13 DIAGNOSIS — I25.10 CORONARY ARTERY DISEASE INVOLVING NATIVE CORONARY ARTERY OF NATIVE HEART WITHOUT ANGINA PECTORIS: ICD-10-CM

## 2020-05-13 RX ORDER — METOPROLOL SUCCINATE 25 MG/1
50 TABLET, EXTENDED RELEASE ORAL 2 TIMES DAILY
Qty: 60 TAB | Refills: 5
Start: 2020-05-13 | End: 2021-01-07 | Stop reason: SDUPTHER

## 2020-05-13 NOTE — PATIENT INSTRUCTIONS
Echocardiogram in July Please call central scheduling at [phone] with any questions All testing is completed at [facility] Medications: 
 
Increase Toprol-XL to 50mg twice daily

## 2020-05-13 NOTE — PROGRESS NOTES
[facility]    Mr. Melissa Suazo is 80 y.o. 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 He is overall doing better since his first visit with me however he still continues to feel occasional tired and fatigue. Sometimes his heart rate gets as high as 120 bpm.  Usually is less than 100. For last couple days heart rate has been elevated. He denies presyncope or syncope. Has some dyspnea with exertion, unchanged. Edema has resolved. Denies any nausea, vomiting, abdominal pain, fever, chills, sputum production. No hematuria or other bleeding complaints    Past Medical History:   Diagnosis Date    A-fib [facility])     S/P Cardioversion X 2 (2018. 2019)    Amiodarone pulmonary toxicity 2019    CAD (coronary artery disease)     Cardiomyopathy (HCC)     LVEF 30% (04/20)    Cardiomyopathy (HCC)     LVEF 30% (04/20)     COPD (chronic obstructive pulmonary disease) (HCC)     GERD (gastroesophageal reflux disease)     [place] (hard of hearing)     HTN (hypertension)     Sleep apnea     CPAP         Past Surgical History:   Procedure Laterality Date    CARDIAC SURG PROCEDURE UNLIST      ENDOGRAFT AORTA    HX CATARACT REMOVAL Bilateral     HX CERVICAL FUSION      HX KNEE REPLACEMENT Right     NEUROLOGICAL PROCEDURE UNLISTED      VASCULAR SURGERY PROCEDURE UNLIST      STENT RENAL ARTERY       Current Outpatient Medications   Medication Sig    MELATONIN PO Take  by mouth nightly as needed.  Symbicort 160-4.5 mcg/actuation HFAA 2 Puffs two (2) times a day.  formoterol fumarate (PERFOROMIST IN) Take  by inhalation.  revefenacin (Yupelri) 175 mcg/3 mL nebu nebulizer solution 175 mcg by Nebulization route daily.  co-enzyme Q-10 (CoQ-10) 100 mg capsule Take 200 mg by mouth daily.     spironolactone (ALDACTONE PO) Take  by mouth.  metoprolol succinate (TOPROL-XL) 25 mg XL tablet Take 1.5 Tabs by mouth daily. (Patient taking differently: Take 37.5 mg by mouth two (2) times a day.)    furosemide (LASIX) 40 mg tablet TAKE 1 TABLET BY MOUTH EVERY DAY    rOPINIRole (REQUIP) 1 mg tablet TAKE 1 TABLET BY MOUTH EVERY DAY    olmesartan (Benicar) 20 mg tablet Take 20 mg by mouth daily.  apixaban (Eliquis) 5 mg tablet Take 5 mg by mouth two (2) times a day. No current facility-administered medications for this visit. Allergies and Sensitivities:  Allergies   Allergen Reactions    Amiodarone Other (comments)       Family History:  No family history on file. Social History:  Social History     Tobacco Use    Smoking status: Former Smoker    Smokeless tobacco: Never Used   Substance Use Topics    Alcohol use: Yes     Alcohol/week: 3.0 standard drinks     Types: 3 Glasses of wine per week    Drug use: Never     He  reports that he has quit smoking. He has never used smokeless tobacco.  He  reports current alcohol use of about 3.0 standard drinks of alcohol per week. Review of Systems:  Cardiac symptoms as noted above in HPI. All others negative. Denies fatigue, malaise, skin rash, joint pain, blurring vision, photophobia, neck pain, hemoptysis, chronic cough, nausea, vomiting, hematuria, burning micturition, BRBPR, chronic headaches. Physical Exam:  BP Readings from Last 3 Encounters:   05/13/20 115/70   05/07/20 115/57   04/30/20 130/78         Pulse Readings from Last 3 Encounters:   05/13/20 (!) 127   05/07/20 67   04/30/20 83          Wt Readings from Last 3 Encounters:   05/13/20 241 lb 6.4 oz (109.5 kg)   05/07/20 239 lb (108.4 kg)   04/30/20 240 lb 6.4 oz (109 kg)       Constitutional: Oriented to person, place, and time. HENT: Head: Normocephalic and atraumatic. Neck: No JVD present. Carotid bruit is not appreciated. Cardiovascular:  Irregular rhythm.    No murmur, gallop or rubs appreciated  Lung: Breath sounds normal. No respiratory distress. No ronchi or rales appreciated  Abdominal: No tenderness. No rebound and no guarding. Musculoskeletal: There is no lower extremity edema. No cynosis    Review of Data  LABS:   No results found for: NA, K, CL, CO2, GLU, BUN, CREA  No flowsheet data found. No results found for: GPT, ALT  No results found for: HBA1C, HGBE8, BMP5ZFMO, BHK5IRDB, UMV4WWMO    EKG  Atrial fibrillation with rapid ventricular response at heart rate of 120 bpm.  Left bundle branch block    ECHO (04/20)  Left Ventricle Normal cavity size and wall thickness. Moderate-to-severe systolic dysfunction. The calculated ejection fraction is 30%. Visually measured ejection fraction. LV wall motion is noted to be hypokinetic. Atrial fibrillation observed. Wall Scoring The left ventricular wall motion is globally hypokinetic. Left Atrium Moderately dilated left atrium. Left Atrium volume index is 45.35 mL/m2. Right Ventricle Mildly dilated right ventricle. Borderline low systolic function. Right Atrium Dilated right atrium. Aortic Valve Cannot rule out bicuspid aortic valve. There is leaflet calcification. Aortic valve mean gradient is 10 mmHg. Aortic valve area is 1.5 cm2. There is mild aortic stenosis. Trace aortic valve regurgitation. Mitral Valve No stenosis. Mitral valve non-specific thickening. Mild mitral annular calcification. Mild regurgitation. Tricuspid Valve No stenosis. Non-specific thickening. Mild regurgitation. Pulmonic Valve Pulmonic valve not well visualized. No stenosis. Mild regurgitation. Aorta Normal aortic root and ascending aorta. Pulmonary Artery Pulmonary arterial systolic pressure (PASP) is 49 mmHg. Pulmonary hypertension found to be mild to moderate.      STRESS TEST (EST, PHARM, NUC, ECHO etc)    CATHETERIZATION    IMPRESSION & PLAN:  Mr. Tahmina Escalante is 89-year male with multiple medical problem    Cardiomyopathy:  LVEF 30% by echo in April 2020. Most likely from A. fib and tachycardia. Will increase his Toprol XL to 50 mg twice daily for better heart rate control  Continue with Lasix 40 mg daily and increase to twice a day as needed depending on heart rate, edema and blood pressure and swelling  We will discontinue spironolactone as patient has no fluid overload at this time and blood pressure occasionally runs borderline low  If despite better control of heart rate, EF has not improved, rhythm control strategy will be considered. Atrial fibrillation:  Paroxysmal in nature according to patient. Initially diagnosed in 2018. Had cardioversion successfully in 2018 and again in 2019. Will increase metoprolol XL to 50 mg twice daily. Heart rate is elevated for last couple days. He is asymptomatic  He will keep checking blood pressure and heart rate every day  If despite better control of heart rate, EF has not improved, rhythm control strategy will be considered. Currently he does not report any anginal symptoms    Hypertension:  Increasing Toprol as mentioned above. Continue Benicar    COPD, emphysema:  Defer to Dr. Pastora Montanez. This plan was discussed with patient who is in agreement. Thank you for allowing me to participate in patient care. Please feel free to call me if you have any question or concern. Melina Gasca MD  Please note: This document has been produced using voice recognition software. Unrecognized errors in transcription may be present.

## 2020-05-13 NOTE — PROGRESS NOTES
Yanetha Given. presents today for   Chief Complaint   Patient presents with    Follow-up     after echo       Teresa Bach. preferred language for health care discussion is english/other. Is someone accompanying this pt? Y, spouse    Is the patient using any DME equipment during [address]? cane    Depression Screening:  3 most recent PHQ Screens 4/21/2020   Little interest or pleasure in doing things Not at all   Feeling down, depressed, irritable, or hopeless Not at all   Total Score PHQ 2 0       Learning Assessment:  No flowsheet data found. Abuse Screening:  No flowsheet data found. Fall Risk  Fall Risk Assessment, last 12 mths 4/21/2020   Able to walk? Yes   Fall in past 12 months? Yes   Fall with injury? No   Number of falls in past 12 months 2   Fall Risk Score 2       Pt currently taking Anticoagulant therapy? Yes, eliquis    Coordination of Care:  1. Have you been to the ER, urgent care clinic since your last visit? Hospitalized since your last visit? no    2. Have you seen or consulted any other health care providers outside of the [address] [place] [address] since your last visit? Include any pap smears or colon screening.  no

## 2020-05-27 ENCOUNTER — TELEPHONE (OUTPATIENT)
Dept: CARDIOLOGY CLINIC | Age: 85
End: 2020-05-27

## 2020-05-27 NOTE — TELEPHONE ENCOUNTER
Patient is complaining of hypotensive episodes with feeling \"jittery\" with the increased dose of metoprolol 50 mg BID. Friday spouse reports that patient's BP was 90/50 but this morning was 108/64 with 95 pulse at 9:30am and now is 129/74 with 76 pulse. Spouse is reporting that patient has some mild intermittent tremors. He has not taken his 50mg Toprol-XL this morning. Spouse admits that patient is not drinking a lot of fluids PO as well. Spouse encouraged to give patient plenty of fluids to prevent dehydration throughout the day. Spouse also instructed to give patient previous dose of 25 mg Toprol-XL and call back tomorrow with BP to discuss further with Dr. Ghislaine Centeno. This has been fully explained to the patient's spouse, who indicates understanding.

## 2020-05-28 ENCOUNTER — TELEPHONE (OUTPATIENT)
Dept: CARDIOLOGY CLINIC | Age: 85
End: 2020-05-28

## 2020-05-28 NOTE — TELEPHONE ENCOUNTER
Verbal order and read back per Wicho Clayton MD  Resume Toprol-XL 50mg BID, and start taking Lasix every other day instead of every day. This has been fully explained to the patient's spouse, who indicates understanding.

## 2020-06-08 ENCOUNTER — HOSPITAL ENCOUNTER (OUTPATIENT)
Dept: LAB | Age: 85
Discharge: HOME | End: 2020-06-08
Payer: MEDICARE

## 2020-06-08 DIAGNOSIS — I48.91 ATRIAL FIBRILLATION (HCC): ICD-10-CM

## 2020-06-08 LAB
ALBUMIN SERPL-MCNC: 3.7 G/DL (ref 3.4–5)
ALBUMIN/GLOB SERPL: 1 {RATIO} (ref 0.8–1.7)
ALP SERPL-CCNC: 52 U/L (ref 45–117)
ALT SERPL-CCNC: 53 U/L (ref 16–61)
ANION GAP SERPL CALC-SCNC: 6 MMOL/L (ref 3–18)
AST SERPL-CCNC: 29 U/L (ref 10–38)
BACTERIA SPEC CULT: NORMAL
BASOPHILS # BLD: 0 K/UL (ref 0–0.1)
BASOPHILS NFR BLD: 0 % (ref 0–2)
BILIRUB SERPL-MCNC: 0.5 MG/DL (ref 0.2–1)
BUN SERPL-MCNC: 41 MG/DL (ref 7–18)
BUN/CREAT SERPL: 22 (ref 12–20)
CALCIUM SERPL-MCNC: 9 MG/DL (ref 8.5–10.1)
CHLORIDE SERPL-SCNC: 105 MMOL/L (ref 100–111)
CO2 SERPL-SCNC: 28 MMOL/L (ref 21–32)
CREAT SERPL-MCNC: 1.87 MG/DL (ref 0.6–1.3)
DIFFERENTIAL METHOD BLD: ABNORMAL
EOSINOPHIL # BLD: 0.1 K/UL (ref 0–0.4)
EOSINOPHIL NFR BLD: 0 % (ref 0–5)
ERYTHROCYTE [DISTWIDTH] IN BLOOD BY AUTOMATED COUNT: 14.7 % (ref 11.6–14.5)
GLOBULIN SER CALC-MCNC: 3.8 G/DL (ref 2–4)
GLUCOSE SERPL-MCNC: 89 MG/DL (ref 74–99)
HCT VFR BLD AUTO: 45.4 % (ref 36–48)
HGB BLD-MCNC: 14.8 G/DL (ref 13–16)
LYMPHOCYTES # BLD: 1.7 K/UL (ref 0.9–3.6)
LYMPHOCYTES NFR BLD: 14 % (ref 21–52)
MCH RBC QN AUTO: 31 PG (ref 24–34)
MCHC RBC AUTO-ENTMCNC: 32.6 G/DL (ref 31–37)
MCV RBC AUTO: 95.2 FL (ref 74–97)
MONOCYTES # BLD: 0.7 K/UL (ref 0.05–1.2)
MONOCYTES NFR BLD: 5 % (ref 3–10)
NEUTS SEG # BLD: 10.3 K/UL (ref 1.8–8)
NEUTS SEG NFR BLD: 81 % (ref 40–73)
PLATELET # BLD AUTO: 226 K/UL (ref 135–420)
PMV BLD AUTO: 11 FL (ref 9.2–11.8)
POTASSIUM SERPL-SCNC: 4.8 MMOL/L (ref 3.5–5.5)
PROT SERPL-MCNC: 7.5 G/DL (ref 6.4–8.2)
RBC # BLD AUTO: 4.77 M/UL (ref 4.7–5.5)
SERVICE CMNT-IMP: NORMAL
SODIUM SERPL-SCNC: 139 MMOL/L (ref 136–145)
WBC # BLD AUTO: 12.7 K/UL (ref 4.6–13.2)

## 2020-06-08 PROCEDURE — 80053 COMPREHEN METABOLIC PANEL: CPT

## 2020-06-08 PROCEDURE — 85025 COMPLETE CBC W/AUTO DIFF WBC: CPT

## 2020-06-08 PROCEDURE — 36415 COLL VENOUS BLD VENIPUNCTURE: CPT

## 2020-06-21 LAB
ACID FAST STN SPEC: NEGATIVE
MYCOBACTERIUM SPEC QL CULT: NEGATIVE
SPECIMEN PREPARATION: NORMAL
SPECIMEN SOURCE: NORMAL

## 2020-07-07 ENCOUNTER — HOSPITAL ENCOUNTER (OUTPATIENT)
Dept: NON INVASIVE DIAGNOSTICS | Age: 85
Discharge: HOME | End: 2020-07-07
Attending: INTERNAL MEDICINE
Payer: MEDICARE

## 2020-07-07 VITALS
BODY MASS INDEX: 31.94 KG/M2 | HEIGHT: 73 IN | WEIGHT: 241 LBS | SYSTOLIC BLOOD PRESSURE: 115 MMHG | DIASTOLIC BLOOD PRESSURE: 70 MMHG

## 2020-07-07 DIAGNOSIS — I50.9 CONGESTIVE HEART FAILURE, UNSPECIFIED HF CHRONICITY, UNSPECIFIED HEART FAILURE TYPE (HCC): ICD-10-CM

## 2020-07-07 LAB
ECHO AO ASC DIAM: 2.95 CM
ECHO AO ROOT DIAM: 3.49 CM
ECHO IVC PROX: 2.49 CM
ECHO IVC SNIFF: 2.49 CM
ECHO LA AREA 2C: 30.14 CM2
ECHO LA AREA 4C: 24.4 CM2
ECHO LA MAJOR AXIS: 4.25 CM
ECHO LA MINOR AXIS: 1.82 CM
ECHO LA TO AORTIC ROOT RATIO: 1.22
ECHO LA VOL 2C: 118.88 ML (ref 18–58)
ECHO LA VOL 4C: 76.58 ML (ref 18–58)
ECHO LA VOL BP: 101.03 ML (ref 18–58)
ECHO LA VOL/BSA BIPLANE: 43.37 ML/M2 (ref 16–28)
ECHO LA VOLUME INDEX A2C: 51.04 ML/M2 (ref 16–28)
ECHO LA VOLUME INDEX A4C: 32.88 ML/M2 (ref 16–28)
ECHO LV EDV TEICHHOLZ: 0.66 ML
ECHO LV ESV TEICHHOLZ: 0.51 ML
ECHO LV INTERNAL DIMENSION DIASTOLIC: 5.06 CM (ref 4.2–5.9)
ECHO LV INTERNAL DIMENSION SYSTOLIC: 4.54 CM
ECHO LV IVSD: 1.2 CM (ref 0.6–1)
ECHO LV MASS 2D: 234.1 G (ref 88–224)
ECHO LV MASS INDEX 2D: 100.5 G/M2 (ref 49–115)
ECHO LV POSTERIOR WALL DIASTOLIC: 1.17 CM (ref 0.6–1)
ECHO LVOT DIAM: 2.25 CM
ECHO RA MINOR AXIS: 4.51 CM
ECHO TV REGURGITANT MAX VELOCITY: 305 CM/S
ECHO TV REGURGITANT PEAK GRADIENT: 37.1 MMHG
LVFS 2D: 10.36 %
LVSV (TEICH): 11.57 ML

## 2020-07-07 PROCEDURE — 93306 TTE W/DOPPLER COMPLETE: CPT

## 2020-07-10 ENCOUNTER — HOSPITAL ENCOUNTER (OUTPATIENT)
Dept: CT IMAGING | Age: 85
Discharge: HOME | End: 2020-07-10
Attending: INTERNAL MEDICINE
Payer: MEDICARE

## 2020-07-10 DIAGNOSIS — J84.10 POSTINFLAMMATORY PULMONARY FIBROSIS (HCC): ICD-10-CM

## 2020-07-10 PROCEDURE — 71250 CT THORAX DX C-: CPT

## 2020-07-15 ENCOUNTER — HOSPITAL ENCOUNTER (OUTPATIENT)
Dept: LAB | Age: 85
Discharge: HOME | End: 2020-07-15
Payer: MEDICARE

## 2020-07-15 ENCOUNTER — OFFICE VISIT (OUTPATIENT)
Dept: CARDIOLOGY CLINIC | Age: 85
End: 2020-07-15

## 2020-07-15 VITALS
SYSTOLIC BLOOD PRESSURE: 114 MMHG | BODY MASS INDEX: 30.62 KG/M2 | WEIGHT: 231 LBS | HEART RATE: 55 BPM | TEMPERATURE: 97.5 F | DIASTOLIC BLOOD PRESSURE: 74 MMHG | OXYGEN SATURATION: 97 % | HEIGHT: 73 IN

## 2020-07-15 DIAGNOSIS — I48.91 ATRIAL FIBRILLATION, UNSPECIFIED TYPE (HCC): Primary | ICD-10-CM

## 2020-07-15 DIAGNOSIS — I48.91 ATRIAL FIBRILLATION, UNSPECIFIED TYPE (HCC): ICD-10-CM

## 2020-07-15 LAB
ANION GAP SERPL CALC-SCNC: 9 MMOL/L (ref 3–18)
BUN SERPL-MCNC: 55 MG/DL (ref 7–18)
BUN/CREAT SERPL: 30 (ref 12–20)
CALCIUM SERPL-MCNC: 9.2 MG/DL (ref 8.5–10.1)
CHLORIDE SERPL-SCNC: 101 MMOL/L (ref 100–111)
CO2 SERPL-SCNC: 26 MMOL/L (ref 21–32)
CREAT SERPL-MCNC: 1.82 MG/DL (ref 0.6–1.3)
GLUCOSE SERPL-MCNC: 89 MG/DL (ref 74–99)
POTASSIUM SERPL-SCNC: 4.6 MMOL/L (ref 3.5–5.5)
SODIUM SERPL-SCNC: 136 MMOL/L (ref 136–145)

## 2020-07-15 PROCEDURE — 80048 BASIC METABOLIC PNL TOTAL CA: CPT

## 2020-07-15 PROCEDURE — 36415 COLL VENOUS BLD VENIPUNCTURE: CPT

## 2020-07-15 RX ORDER — BISMUTH SUBSALICYLATE 262 MG
1 TABLET,CHEWABLE ORAL DAILY
COMMUNITY

## 2020-07-15 RX ORDER — ATORVASTATIN CALCIUM 10 MG/1
TABLET, FILM COATED ORAL DAILY
COMMUNITY
End: 2021-04-27 | Stop reason: ALTCHOICE

## 2020-07-15 RX ORDER — BUMETANIDE 0.5 MG/1
TABLET ORAL DAILY
COMMUNITY
End: 2020-07-15 | Stop reason: ALTCHOICE

## 2020-07-15 RX ORDER — PREDNISONE 10 MG/1
5 TABLET ORAL DAILY
COMMUNITY
End: 2020-12-10 | Stop reason: ALTCHOICE

## 2020-07-15 RX ORDER — LANOLIN ALCOHOL/MO/W.PET/CERES
400 CREAM (GRAM) TOPICAL DAILY
COMMUNITY

## 2020-07-15 NOTE — PATIENT INSTRUCTIONS
[facility]/\[facility]\""          Patient  EP Instructions                  1. You are scheduled to have a Cardioversion on 7/20/20  at 8:30 am Please check in at 6:30 am  .     2. Please go to [facility] enterance The  will either give you directions or assist you in getting to the cath holding area. 3.  You are not to eat or drink anything after midnight the night before your                   procedure. 4. Please continue to take your medications with a small sip of water on the morning of the procedure with the following exceptions:  lasix     5. If you are diabetic, do not take your insulin/sugar pill the morning of the procedure. 6. We encourage families to wait in the waiting room on the first floor while the procedure is being done. The Doctor will come out and talk with you as soon as the procedure is over. 7. There is the possibility that you may spend the night in the hospital, depending on the results of the procedure. This will be determined after the procedure is done. 8.   If you or your family have any questions, please call our office Monday-Friday 9:00am         -4:30 pm , at [phone], and ask to speak to one of the nurses.       Referral to Dr. Lizzie Campbell for consultation: possible ablation for a-fib

## 2020-07-15 NOTE — PROGRESS NOTES
Miriamchristina Roper presents today for   Chief Complaint   Patient presents with   [address]. preferred language for health care discussion is english/other. Is someone accompanying this pt? Yes wife    Is the patient using any DME equipment during [address]? Yes cane    Depression Screening:  3 most recent PHQ Screens 7/15/2020   Little interest or pleasure in doing things Not at all   Feeling down, depressed, irritable, or hopeless Not at all   Total Score PHQ 2 0       Learning Assessment:  No flowsheet data found. Abuse Screening:  completed    Fall Risk  Fall Risk Assessment, last 12 mths 7/15/2020   Able to walk? Yes   Fall in past 12 months? No   Fall with injury? -   Number of falls in past 12 months -   Fall Risk Score -       Pt currently taking Anticoagulant therapy? no    Coordination of Care:  1. Have you been to the ER, urgent care clinic since your last visit? Hospitalized since your last visit? no    2. Have you seen or consulted any other health care providers outside of the [address] [place] [address] since your last visit? Include any pap smears or colon screening.   yes

## 2020-07-15 NOTE — PROGRESS NOTES
[facility]    Mr. Rueda Seen is 80 y.o. 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 He continues to have dyspnea with minimal exertion and also fatigue and tiredness. He has undergone echocardiogram.  With dietary modification and Lasix, he is able to lost 15 pounds over last couple weeks. His edema has improved. Denies any nausea, vomiting, abdominal pain, fever, chills, sputum production. No hematuria or other bleeding complaints    Past Medical History:   Diagnosis Date    A-fib [facility])     S/P Cardioversion X 2 (2018. 2019)    Amiodarone pulmonary toxicity 2019    CAD (coronary artery disease)     Cardiomyopathy (HCC)     LVEF 30% (04/20)    Cardiomyopathy (HCC)     LVEF 30% (04/20)     COPD (chronic obstructive pulmonary disease) (HCC)     GERD (gastroesophageal reflux disease)     [place] (hard of hearing)     HTN (hypertension)     Sleep apnea     CPAP         Past Surgical History:   Procedure Laterality Date    CARDIAC SURG PROCEDURE UNLIST      ENDOGRAFT AORTA    HX CATARACT REMOVAL Bilateral     HX CERVICAL FUSION      HX KNEE REPLACEMENT Right     NEUROLOGICAL PROCEDURE UNLISTED      VASCULAR SURGERY PROCEDURE UNLIST      STENT RENAL ARTERY       Current Outpatient Medications   Medication Sig    metoprolol succinate (TOPROL-XL) 25 mg XL tablet Take 2 Tabs by mouth two (2) times a day.  melatonin 5 mg tablet Take  by mouth nightly as needed.  formoterol fumarate (PERFOROMIST IN) Take  by inhalation.  revefenacin (Yupelri) 175 mcg/3 mL nebu nebulizer solution 175 mcg by Nebulization route daily.  co-enzyme Q-10 (CoQ-10) 100 mg capsule Take 200 mg by mouth daily.  spironolactone (Aldactone) 25 mg tablet Take  by mouth.     furosemide (LASIX) 40 mg tablet TAKE 1 TABLET BY MOUTH EVERY DAY    rOPINIRole (REQUIP) 1 mg tablet TAKE 1 TABLET BY MOUTH EVERY DAY    olmesartan (Benicar) 40 mg tablet Take 40 mg by mouth daily.  apixaban (Eliquis) 5 mg tablet Take 5 mg by mouth two (2) times a day. No current facility-administered medications for this visit. Allergies and Sensitivities:  Allergies   Allergen Reactions    Amiodarone Other (comments)       Family History:  No family history on file. Social History:  Social History     Tobacco Use    Smoking status: Former Smoker    Smokeless tobacco: Never Used   Substance Use Topics    Alcohol use: Yes     Alcohol/week: 3.0 standard drinks     Types: 3 Glasses of wine per week    Drug use: Never     He  reports that he has quit smoking. He has never used smokeless tobacco.  He  reports current alcohol use of about 3.0 standard drinks of alcohol per week. Review of Systems:  Cardiac symptoms as noted above in HPI. All others negative. Denies fatigue, malaise, skin rash, joint pain, blurring vision, photophobia, neck pain, hemoptysis, chronic cough, nausea, vomiting, hematuria, burning micturition, BRBPR, chronic headaches. Physical Exam:  BP Readings from Last 3 Encounters:   07/15/20 114/74   07/07/20 115/70   05/13/20 115/70         Pulse Readings from Last 3 Encounters:   07/15/20 (!) 55   05/13/20 (!) 127   05/07/20 67          Wt Readings from Last 3 Encounters:   07/15/20 231 lb (104.8 kg)   07/07/20 241 lb (109.3 kg)   05/13/20 241 lb 6.4 oz (109.5 kg)       Constitutional: Oriented to person, place, and time. HENT: Head: Normocephalic and atraumatic. Neck: No JVD present. Carotid bruit is not appreciated. Cardiovascular:  Irregular rhythm. No murmur, gallop or rubs appreciated  Lung: Breath sounds normal. No respiratory distress. No ronchi or rales appreciated  Abdominal: No tenderness. No rebound and no guarding. Musculoskeletal: There is trace/1+ ankle edema.  No cynosis    Review of Data  LABS:   Lab Results   Component Value Date/Time    Sodium 139 06/08/2020 09:39 AM    Potassium 4.8 06/08/2020 09:39 AM    Chloride 105 06/08/2020 09:39 AM    CO2 28 06/08/2020 09:39 AM    Glucose 89 06/08/2020 09:39 AM    BUN 41 (H) 06/08/2020 09:39 AM    Creatinine 1.87 (H) 06/08/2020 09:39 AM     No flowsheet data found. Lab Results   Component Value Date/Time    ALT (SGPT) 53 06/08/2020 09:39 AM     No results found for: HBA1C, HGBE8, GHF6EQTW, QVV9GXIA, VQQ0MPSD    EKG  Atrial fibrillation with rapid ventricular response at heart rate of 120 bpm.  Left bundle branch block    ECHO (07/20)  Left Ventricle  Normal cavity size. Mild concentric hypertrophy. Moderate-to-severe systolic dysfunction. The estimated ejection fraction is 20 - 25%. Visually measured ejection fraction. LV wall motion is noted to be hypokinetic. Atrial fibrillation observed. Wall Scoring  The left ventricular wall motion is globally hypokinetic. Left Atrium  Moderately dilated left atrium. Left Atrium volume index is 43.36 mL/m2. Right Ventricle  Moderately dilated right ventricle. Reduced systolic function. Right Atrium  Severely dilated right atrium. Aortic Valve  Trileaflet valve structure and no regurgitation. There is moderate noncoronary and left leaflet calcification. There is mild aortic stenosis. Mitral Valve  No stenosis. Mitral valve non-specific thickening. Mild mitral annular calcification. Mild regurgitation. Tricuspid Valve  Normal valve structure and no stenosis. Mild regurgitation. Pulmonic Valve  Normal valve structure and no stenosis. Mild regurgitation. Aorta  Normal aortic root and ascending aorta. Pulmonary Artery  Pulmonary arterial systolic pressure (PASP) is 45 mmHg. Pulmonary hypertension found to be mild to moderate.         STRESS TEST (EST, PHARM, NUC, ECHO etc)    CATHETERIZATION    IMPRESSION & PLAN:  Mr. Nelson Sherman is 89-year male with multiple medical problem    Cardiomyopathy:  LVEF 30% by echo in April 2020. Most likely from A. fib and tachycardia. LVEF 25% in July 2020. Continue Toprol XL to 50 mg twice daily   Continue with Lasix 40 mg daily and increase to twice a day as needed depending on edema and blood pressure and swelling. Currently stable  He is taking spironolactone every other day. If despite better control of heart rate, EF has not improved, rhythm control strategy will be considered. Will consider cardioversion    Atrial fibrillation:  Paroxysmal in nature according to patient. Initially diagnosed in 2018. Had cardioversion successfully in 2018 and again in 2019. Continue metoprolol XL to 50 mg twice daily. H/O amiodarone related eye and lung toxicity. If despite better control of heart rate, EF has not improved, rhythm control strategy will be considered. DW patient about rate control strategy vs rhythm control strategy. Risk , benefit and alternatives and complication of both discussed  Complication assocaited with cardioversion discussed in detail including but not limited to emergent thoracic surgery, pacemaker need, cardiac arrest and stroke. .. Patient agreeable for cardioversion. He had cardioversion done twice in the past without JAIME as he is already on Eliquis. He understand the potential risk of stroke as well. Hypertension:  Continue current medications    COPD, emphysema:  Defer to Dr. Tigist [place]. This plan was discussed with patient who is in agreement. Thank you for allowing me to participate in patient care. Please feel free to call me if you have any question or concern. Zia Mendez MD  Please note: This document has been produced using voice recognition software. Unrecognized errors in transcription may be present.

## 2020-07-20 ENCOUNTER — ANESTHESIA (OUTPATIENT)
Dept: CARDIAC CATH/INVASIVE PROCEDURES | Age: 85
End: 2020-07-20
Payer: MEDICARE

## 2020-07-20 ENCOUNTER — ANESTHESIA EVENT (OUTPATIENT)
Dept: CARDIAC CATH/INVASIVE PROCEDURES | Age: 85
End: 2020-07-20
Payer: MEDICARE

## 2020-07-20 ENCOUNTER — HOSPITAL ENCOUNTER (OUTPATIENT)
Age: 85
Setting detail: OUTPATIENT SURGERY
Discharge: HOME | End: 2020-07-20
Attending: INTERNAL MEDICINE | Admitting: INTERNAL MEDICINE
Payer: MEDICARE

## 2020-07-20 VITALS
SYSTOLIC BLOOD PRESSURE: 113 MMHG | OXYGEN SATURATION: 96 % | TEMPERATURE: 97.1 F | HEIGHT: 73 IN | DIASTOLIC BLOOD PRESSURE: 58 MMHG | HEART RATE: 65 BPM | RESPIRATION RATE: 15 BRPM | WEIGHT: 230.3 LBS | BODY MASS INDEX: 30.52 KG/M2

## 2020-07-20 DIAGNOSIS — I48.91 ATRIAL FIBRILLATION (HCC): ICD-10-CM

## 2020-07-20 PROCEDURE — 92960 CARDIOVERSION ELECTRIC EXT: CPT | Performed by: INTERNAL MEDICINE

## 2020-07-20 PROCEDURE — 76060000031 HC ANESTHESIA FIRST 0.5 HR: Performed by: INTERNAL MEDICINE

## 2020-07-20 PROCEDURE — 74011250636 HC RX REV CODE- 250/636: Performed by: NURSE ANESTHETIST, CERTIFIED REGISTERED

## 2020-07-20 PROCEDURE — 93005 ELECTROCARDIOGRAM TRACING: CPT

## 2020-07-20 PROCEDURE — 74011250636 HC RX REV CODE- 250/636: Performed by: ANESTHESIOLOGY

## 2020-07-20 PROCEDURE — 76210000006 HC OR PH I REC 0.5 TO 1 HR: Performed by: INTERNAL MEDICINE

## 2020-07-20 RX ORDER — PROPOFOL 10 MG/ML
INJECTION, EMULSION INTRAVENOUS AS NEEDED
Status: DISCONTINUED | OUTPATIENT
Start: 2020-07-20 | End: 2020-07-20 | Stop reason: HOSPADM

## 2020-07-20 RX ORDER — SODIUM CHLORIDE, SODIUM LACTATE, POTASSIUM CHLORIDE, CALCIUM CHLORIDE 600; 310; 30; 20 MG/100ML; MG/100ML; MG/100ML; MG/100ML
25 INJECTION, SOLUTION INTRAVENOUS CONTINUOUS
Status: DISCONTINUED | OUTPATIENT
Start: 2020-07-20 | End: 2020-07-20 | Stop reason: HOSPADM

## 2020-07-20 RX ADMIN — SODIUM CHLORIDE, SODIUM LACTATE, POTASSIUM CHLORIDE, AND CALCIUM CHLORIDE 25 ML/HR: 600; 310; 30; 20 INJECTION, SOLUTION INTRAVENOUS at 10:51

## 2020-07-20 RX ADMIN — PROPOFOL 20 MG: 10 INJECTION, EMULSION INTRAVENOUS at 11:06

## 2020-07-20 RX ADMIN — PROPOFOL 50 MG: 10 INJECTION, EMULSION INTRAVENOUS at 11:05

## 2020-07-20 RX ADMIN — PROPOFOL 20 MG: 10 INJECTION, EMULSION INTRAVENOUS at 11:07

## 2020-07-20 NOTE — DISCHARGE INSTRUCTIONS
Patient Education        Electrical Cardioversion: What to Expect at Home  Your Recovery    Electrical cardioversion is a treatment for an abnormal heartbeat, such as atrial fibrillation, supraventricular tachycardia, or ventricular tachycardia (VT). It uses a brief electrical shock to reset your heart's rhythm. After cardioversion, you may have redness, like a sunburn, where the patches were. The medicines you got to make you sleepy may make you feel drowsy for the rest of the day. Your doctor may have you take medicines to help the heart beat normally and to prevent blood clots. This care sheet gives you a general idea about how long it will take for you to recover. But each person recovers at a different pace. Follow the steps below to feel better as quickly as possible. How can you care for yourself at home? Medicines  · Be safe with medicines. Take your medicines exactly as prescribed. Call your doctor if you think you are having a problem with your medicine. You may take one or more of the following medicines:  ? Rate-control medicines to slow the heart rate. These include beta-blockers, calcium channel blockers, and digoxin. ? Rhythm control medicines that help the heart keep a normal rhythm. ? Blood thinners, also called anticoagulants, which help prevent blood clots. You will get more details on the specific medicines your doctor prescribes. Be sure you know how to take your medicines safely. · Do not take any vitamins, over-the-counter medicines, or herbal products without talking to your doctor first.  Exercise  · Start light exercise if your doctor says that it is okay. Even a small amount will help you get stronger, have more energy, and manage your stress. Walking is an easy way to get exercise. Start out by walking a little more than you did in the hospital. Bit by bit, increase the amount you walk. · When you exercise, watch for signs that your heart is working too hard.  You are pushing too hard if you cannot talk while you are exercising. If you become short of breath or dizzy or have chest pain, sit down and rest right away. · Check your pulse regularly. Place two fingers on the artery at the palm side of your wrist in line with your thumb. If your heartbeat seems uneven or fast, talk to your doctor. Other instructions  · Ask your doctor when you can drive again. · Do not smoke. If you need help quitting, talk to your doctor about stop-smoking programs and medicines. These can increase your chances of quitting for good. · Limit alcohol. Follow-up care is a key part of your treatment and safety. Be sure to make and go to all appointments, and call your doctor if you are having problems. It's also a good idea to know your test results and keep a list of the medicines you take. When should you call for help? [id number] anytime you think you may need emergency care. For example, call if:  · You passed out (lost consciousness). · You have chest pain or pressure. This may occur with:  ? Sweating. ? Shortness of breath. ? Nausea or vomiting. ? Pain that spreads from the chest to the neck, jaw, or one or both shoulders or arms. ? A fast or uneven pulse. After calling 911, the  may tell you to chew 1 adult-strength or 2 to 4 low-dose aspirin. Wait for an ambulance. Do not try to drive yourself. · You have symptoms of a stroke. These may include:  ? Sudden numbness, tingling, weakness, or loss of movement in your face, arm, or leg, especially on only one side of your body. ? Sudden vision changes. ? Sudden trouble speaking. ? Sudden confusion or trouble understanding simple statements. ? Sudden problems with walking or balance. ? A sudden, severe headache that is different from past headaches. Call your doctor now or seek immediate medical care if:  · You feel dizzy or lightheaded, or you feel like you may faint. · You have a fast or irregular heartbeat.   Watch closely for any changes in your health, and be sure to contact your doctor if you have any problems. Where can you learn more? Go to http://janette-thomas.info/  Enter A617 in the search box to learn more about \"Electrical Cardioversion: What to Expect at Home. \"  Current as of: December 16, 2019               Content Version: 12.5  © 8381-8732 [employer]. Care instructions adapted under license by [employer] (which disclaims liability or warranty for this information). If you have questions about a medical condition or this instruction, always ask your healthcare professional. [place] [address] any warranty or liability for your use of this information. DISCHARGE SUMMARY from Nurse    PATIENT INSTRUCTIONS:    After general anesthesia or intravenous sedation, for 24 hours or while taking prescription Narcotics:  · Limit your activities  · Do not drive and operate hazardous machinery  · Do not make important personal or business decisions  · Do  not drink alcoholic beverages  · If you have not urinated within 8 hours after discharge, please contact your surgeon on call. Report the following to your surgeon:  · Excessive pain, swelling, redness or odor of or around the surgical area  · Temperature over 100.5  · Nausea and vomiting lasting longer than 4 hours or if unable to take medications  · Any signs of decreased circulation or nerve impairment to extremity: change in color, persistent  numbness, tingling, coldness or increase pain  · Any questions    What to do at Home:  Recommended activity: Activity as tolerated, no driving today    If you experience any of the following symptoms chest pain, shortness of breath, dizziness, please follow up with nearest er and Dr. Cyn Mares. *  Please give a list of your current medications to your Primary Care Provider.     *  Please update this list whenever your medications are discontinued, doses are      changed, or new medications (including over-the-counter products) are added. *  Please carry medication information at all times in case of emergency situations. These are general instructions for a healthy lifestyle:    No smoking/ No tobacco products/ Avoid exposure to second hand smoke  Surgeon General's Warning:  Quitting smoking now greatly reduces serious risk to your health. Obesity, smoking, and sedentary lifestyle greatly increases your risk for illness    A healthy diet, regular physical exercise & weight monitoring are important for maintaining a healthy lifestyle    You may be retaining fluid if you have a history of heart failure or if you experience any of the following symptoms:  Weight gain of 3 pounds or more overnight or 5 pounds in a week, increased swelling in our hands or feet or shortness of breath while lying flat in bed. Please call your doctor as soon as you notice any of these symptoms; do not wait until your next office visit. The discharge information has been reviewed with the patient. The patient verbalized understanding. Discharge medications reviewed with the patient and appropriate educational materials and side effects teaching were provided.   ___________________________________________________________________________________________________________________________________

## 2020-07-20 NOTE — ANESTHESIA PREPROCEDURE EVALUATION
Relevant Problems   No relevant active problems       Anesthetic History   No history of anesthetic complications            Review of Systems / Medical History  Patient summary reviewed and pertinent labs reviewed    Pulmonary    COPD    Sleep apnea           Neuro/Psych   Within defined limits           Cardiovascular    Hypertension        Dysrhythmias : atrial fibrillation  CAD         GI/Hepatic/Renal     GERD    Renal disease: CRI       Endo/Other        Obesity and arthritis     Other Findings              Physical Exam    Airway  Mallampati: III  TM Distance: 4 - 6 cm  Neck ROM: decreased range of motion   Mouth opening: Diminished (comment)     Cardiovascular    Rhythm: irregular  Rate: normal         Dental    Dentition: Poor dentition     Pulmonary  Breath sounds clear to auscultation               Abdominal  GI exam deferred       Other Findings            Anesthetic Plan    ASA: 3  Anesthesia type: MAC and total IV anesthesia            Anesthetic plan and risks discussed with: Patient

## 2020-07-20 NOTE — PERIOP NOTES
Recd care of pt s/p cardioversion. Attached to monitor. VSS. MAR and anesthesia report acknowledged. Dr. Alena Huddleston in unit. Pt denies pain or SOB.

## 2020-07-20 NOTE — PERIOP NOTES
Pre-Op Summary    Pt arrived via car with family/friend and is oriented to time, place, person and situation. Patient with unsteady gait with cane assistive devices. Visit Vitals  /78 (BP 1 Location: Left arm, BP Patient Position: At rest)   Pulse (!) 145   Temp 97.4 °F (36.3 °C)   Resp 20   Ht 6' 1\" (1.854 m)   Wt 104.5 kg (230 lb 4.8 oz)   SpO2 96%   BMI 30.38 kg/m²       Peripheral IV located on Right hand . Patients belongings are located at bedside. Patient's point of contact is Robb Bernstein, wife and their contact number is: [phone]. They will be called for . They are able to receive medication information. They will be their ride home.

## 2020-07-20 NOTE — ANESTHESIA POSTPROCEDURE EVALUATION
Procedure(s):  EP CARDIOVERSION. MAC, total IV anesthesia    Anesthesia Post Evaluation      Multimodal analgesia: multimodal analgesia used between 6 hours prior to anesthesia start to PACU discharge  Patient location during evaluation: bedside  Patient participation: complete - patient participated  Level of consciousness: awake  Pain management: adequate  Airway patency: patent  Anesthetic complications: no  Cardiovascular status: stable  Respiratory status: acceptable  Hydration status: acceptable  Post anesthesia nausea and vomiting:  controlled      INITIAL Post-op Vital signs:   Vitals Value Taken Time   /58 7/20/2020 11:31 AM   Temp 36.2 °C (97.1 °F) 7/20/2020 11:16 AM   Pulse 61 7/20/2020 11:44 AM   Resp 15 7/20/2020 11:43 AM   SpO2 96 % 7/20/2020 11:44 AM   Vitals shown include unvalidated device data.

## 2020-07-21 LAB
ATRIAL RATE: 70 BPM
ATRIAL RATE: 75 BPM
CALCULATED P AXIS, ECG09: 50 DEGREES
CALCULATED R AXIS, ECG10: -50 DEGREES
CALCULATED R AXIS, ECG10: -90 DEGREES
CALCULATED T AXIS, ECG11: 124 DEGREES
CALCULATED T AXIS, ECG11: 145 DEGREES
DIAGNOSIS, 93000: NORMAL
DIAGNOSIS, 93000: NORMAL
P-R INTERVAL, ECG05: 182 MS
Q-T INTERVAL, ECG07: 350 MS
Q-T INTERVAL, ECG07: 412 MS
QRS DURATION, ECG06: 120 MS
QRS DURATION, ECG06: 148 MS
QTC CALCULATION (BEZET), ECG08: 460 MS
QTC CALCULATION (BEZET), ECG08: 536 MS
VENTRICULAR RATE, ECG03: 141 BPM
VENTRICULAR RATE, ECG03: 75 BPM

## 2020-07-22 ENCOUNTER — TELEPHONE (OUTPATIENT)
Dept: CARDIOLOGY CLINIC | Age: 85
End: 2020-07-22

## 2020-07-22 DIAGNOSIS — I48.91 ATRIAL FIBRILLATION, UNSPECIFIED TYPE (HCC): Primary | ICD-10-CM

## 2020-07-22 DIAGNOSIS — M79.89 SWELLING OF LOWER LEG: ICD-10-CM

## 2020-07-22 NOTE — TELEPHONE ENCOUNTER
Incoming from pts wife. Two patient Identifiers confirmed. Advised pt is having increased bilateral feet and ankle swelling since having cardioversion. Pt has been taking lasix 40 mg daily and spironolactone. Per pts wife he has up to 70-80 oz of fluid daily. She stated pts output has only been 20 ml today. Advised I would try to get a message to Dr Jennifer Gastelum but he was not in office. Advised he would be in office tomorrow afternoon and I would be able to get a response by then. Pts wife verbalized understanding.

## 2020-07-22 NOTE — TELEPHONE ENCOUNTER
Contacted pts wife at number. Two patient Identifiers confirmed. Advised pt per Dr Claudia Saunders. Pts wife  verbalized understanding and stated pt did not want to go to the ER and would see provider in office tomorrow.

## 2020-07-22 NOTE — TELEPHONE ENCOUNTER
Verbal order and read back per Erick Coello MD  Pt can go to ER or get BMP last stat and see me in office tomorrow.

## 2020-07-23 ENCOUNTER — HOSPITAL ENCOUNTER (OUTPATIENT)
Dept: LAB | Age: 85
Discharge: HOME | End: 2020-07-23
Payer: MEDICARE

## 2020-07-23 ENCOUNTER — OFFICE VISIT (OUTPATIENT)
Dept: CARDIOLOGY CLINIC | Age: 85
End: 2020-07-23

## 2020-07-23 VITALS
RESPIRATION RATE: 18 BRPM | OXYGEN SATURATION: 97 % | HEART RATE: 54 BPM | TEMPERATURE: 97.5 F | SYSTOLIC BLOOD PRESSURE: 114 MMHG | BODY MASS INDEX: 31.4 KG/M2 | DIASTOLIC BLOOD PRESSURE: 48 MMHG | WEIGHT: 238 LBS

## 2020-07-23 DIAGNOSIS — R06.02 SOB (SHORTNESS OF BREATH): ICD-10-CM

## 2020-07-23 DIAGNOSIS — I48.91 ATRIAL FIBRILLATION, UNSPECIFIED TYPE (HCC): Primary | ICD-10-CM

## 2020-07-23 DIAGNOSIS — I50.9 CONGESTIVE HEART FAILURE, UNSPECIFIED HF CHRONICITY, UNSPECIFIED HEART FAILURE TYPE (HCC): ICD-10-CM

## 2020-07-23 DIAGNOSIS — M79.89 SWELLING OF LOWER LEG: ICD-10-CM

## 2020-07-23 DIAGNOSIS — I25.10 CORONARY ARTERY DISEASE INVOLVING NATIVE CORONARY ARTERY OF NATIVE HEART WITHOUT ANGINA PECTORIS: ICD-10-CM

## 2020-07-23 DIAGNOSIS — I48.91 ATRIAL FIBRILLATION, UNSPECIFIED TYPE (HCC): ICD-10-CM

## 2020-07-23 LAB
ANION GAP SERPL CALC-SCNC: 6 MMOL/L (ref 3–18)
BUN SERPL-MCNC: 52 MG/DL (ref 7–18)
BUN/CREAT SERPL: 29 (ref 12–20)
CALCIUM SERPL-MCNC: 8.9 MG/DL (ref 8.5–10.1)
CHLORIDE SERPL-SCNC: 96 MMOL/L (ref 100–111)
CO2 SERPL-SCNC: 31 MMOL/L (ref 21–32)
CREAT SERPL-MCNC: 1.78 MG/DL (ref 0.6–1.3)
GLUCOSE SERPL-MCNC: 79 MG/DL (ref 74–99)
POTASSIUM SERPL-SCNC: 4.3 MMOL/L (ref 3.5–5.5)
SODIUM SERPL-SCNC: 133 MMOL/L (ref 136–145)

## 2020-07-23 PROCEDURE — 80048 BASIC METABOLIC PNL TOTAL CA: CPT

## 2020-07-23 PROCEDURE — 36415 COLL VENOUS BLD VENIPUNCTURE: CPT

## 2020-07-23 RX ORDER — BUDESONIDE 0.5 MG/2ML
500 INHALANT ORAL
COMMUNITY

## 2020-07-23 RX ORDER — BUDESONIDE AND FORMOTEROL FUMARATE DIHYDRATE 160; 4.5 UG/1; UG/1
2 AEROSOL RESPIRATORY (INHALATION)
COMMUNITY

## 2020-07-23 NOTE — PATIENT INSTRUCTIONS
Testing   Limited Echo in 3 months     Please call [facility] central scheduling at [phone]  to schedule an appointment.    All testing is completed at [address] [facility], [address]  **call office three days after testing for results**     Medication  Decrease Toprol to 25 mg twice a day     **please allow 24-48 hrs for medication to be escribed to pharmacy**

## 2020-07-23 NOTE — PROGRESS NOTES
Afua Burns presents today for   Chief Complaint   Patient presents with   [facility] [address]. preferred language for health care discussion is english/other. Is someone accompanying this pt? yes    Is the patient using any DME equipment during OV? cane    Depression Screening:  3 most recent PHQ Screens 7/15/2020   Little interest or pleasure in doing things Not at all   Feeling down, depressed, irritable, or hopeless Not at all   Total Score PHQ 2 0       Learning Assessment:  No flowsheet data found. Abuse Screening:  Abuse Screening Questionnaire 7/15/2020   Do you ever feel afraid of your partner? N   Are you in a relationship with someone who physically or mentally threatens you? N   Is it safe for you to go home? Y       Fall Risk  Fall Risk Assessment, last 12 mths 7/15/2020   Able to walk? Yes   Fall in past 12 months? No   Fall with injury? -   Number of falls in past 12 months -   Fall Risk Score -       Pt currently taking Anticoagulant therapy? y    Coordination of Care:  1. Have you been to the ER, urgent care clinic since your last visit? Hospitalized since your last visit? y    2. Have you seen or consulted any other health care providers outside of the [address] [place] [address] since your last visit?  Include any pap smears or colon screening. y

## 2020-07-23 NOTE — PROGRESS NOTES
[facility]    Mr. Mikey Sheffield is 80 y.o. 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 He underwent cardioversion since last visit. He had a cardioversion 2 days ago and he feels that his shortness of breath is slightly better. He feels fatigue and tired. He thinks that he is not making enough urine since then. He had a stat BMP performed this morning before this visit. He denies any presyncope or syncope  Denies any nausea, vomiting, abdominal pain, fever, chills, sputum production. No hematuria or other bleeding complaints    Past Medical History:   Diagnosis Date    A-fib [facility])     S/P Cardioversion X 2 (2018. 2019)    Amiodarone pulmonary toxicity 2019    CAD (coronary artery disease)     Cardiomyopathy (HCC)     LVEF 25% (07/20) 30% (04/20)    COPD (chronic obstructive pulmonary disease) (HCC)     GERD (gastroesophageal reflux disease)     HTN (hypertension)     Sleep apnea     CPAP         Past Surgical History:   Procedure Laterality Date    CARDIAC SURG PROCEDURE UNLIST      ENDOGRAFT AORTA    HX CATARACT REMOVAL Bilateral     HX CERVICAL FUSION      HX KNEE REPLACEMENT Right     NEUROLOGICAL PROCEDURE UNLISTED      [place] CARDIOVERSION ELECTIVE ARRHYTHMIA EXTERNAL N/A 7/20/2020    EP CARDIOVERSION performed by Zbigniew Kirk MD at [facility] CATH LAB    VASCULAR SURGERY PROCEDURE UNLIST      STENT RENAL ARTERY       Current Outpatient Medications   Medication Sig    budesonide (PULMICORT) 0.5 mg/2 mL nbsp 500 mcg by Nebulization route.  budesonide-formoteroL (Symbicort) 160-4.5 mcg/actuation HFAA Take 2 Puffs by inhalation.  magnesium oxide (MAG-OX) 400 mg tablet Take 400 mg by mouth daily.  atorvastatin (Lipitor) 10 mg tablet Take  by mouth daily.     AZILSARTAN MEDOXOMIL PO Take  by mouth.  predniSONE (DELTASONE) 10 mg tablet Take  by mouth daily (with breakfast).  multivitamin (ONE A DAY) tablet Take 1 Tab by mouth daily.  cholecalciferol, vitamin D3, (VITAMIN D3 PO) Take  by mouth.  metoprolol succinate (TOPROL-XL) 25 mg XL tablet Take 2 Tabs by mouth two (2) times a day.  melatonin 5 mg tablet Take  by mouth nightly as needed.  formoterol fumarate (PERFOROMIST IN) Take  by inhalation.  revefenacin (Yupelri) 175 mcg/3 mL nebu nebulizer solution 175 mcg by Nebulization route daily.  co-enzyme Q-10 (CoQ-10) 100 mg capsule Take 200 mg by mouth daily.  spironolactone (Aldactone) 25 mg tablet Take  by mouth.  furosemide (LASIX) 40 mg tablet TAKE 1 TABLET BY MOUTH EVERY DAY    rOPINIRole (REQUIP) 1 mg tablet TAKE 1 TABLET BY MOUTH EVERY DAY    olmesartan (Benicar) 40 mg tablet Take 40 mg by mouth daily.  apixaban (Eliquis) 5 mg tablet Take 5 mg by mouth two (2) times a day. No current facility-administered medications for this visit. Allergies and Sensitivities:  Allergies   Allergen Reactions    Amiodarone Other (comments)       Family History:  No family history on file. Social History:  Social History     Tobacco Use    Smoking status: Former Smoker    Smokeless tobacco: Never Used   Substance Use Topics    Alcohol use: Yes     Alcohol/week: 3.0 standard drinks     Types: 3 Glasses of wine per week    Drug use: Never     He  reports that he has quit smoking. He has never used smokeless tobacco.  He  reports current alcohol use of about 3.0 standard drinks of alcohol per week. Review of Systems:  Cardiac symptoms as noted above in HPI. All others negative. Denies fatigue, malaise, skin rash, joint pain, blurring vision, photophobia, neck pain, hemoptysis, chronic cough, nausea, vomiting, hematuria, burning micturition, BRBPR, chronic headaches.     Physical Exam:  BP Readings from Last 3 Encounters:   07/23/20 114/48 07/20/20 113/58   07/15/20 114/74         Pulse Readings from Last 3 Encounters:   07/23/20 (!) 54   07/20/20 65   07/15/20 (!) 55          Wt Readings from Last 3 Encounters:   07/23/20 238 lb (108 kg)   07/20/20 230 lb 4.8 oz (104.5 kg)   07/15/20 231 lb (104.8 kg)       Constitutional: Oriented to person, place, and time. HENT: Head: Normocephalic and atraumatic. Neck: No JVD present. Carotid bruit is not appreciated. Cardiovascular: Regular rhythm. No murmur, gallop or rubs appreciated  Lung: Breath sounds normal. No respiratory distress. No ronchi or rales appreciated  Abdominal: No tenderness. No rebound and no guarding. Musculoskeletal: There is trace/1+ ankle edema. No cynosis    Review of Data  LABS:   Lab Results   Component Value Date/Time    Sodium 133 (L) 07/23/2020 09:06 AM    Potassium 4.3 07/23/2020 09:06 AM    Chloride 96 (L) 07/23/2020 09:06 AM    CO2 31 07/23/2020 09:06 AM    Glucose 79 07/23/2020 09:06 AM    BUN 52 (H) 07/23/2020 09:06 AM    Creatinine 1.78 (H) 07/23/2020 09:06 AM     No flowsheet data found. Lab Results   Component Value Date/Time    ALT (SGPT) 53 06/08/2020 09:39 AM     No results found for: HBA1C, HGBE8, IJL9DTBE, HIV6QOAD, UBF0CQVH    EKG  Atrial fibrillation with rapid ventricular response at heart rate of 120 bpm.  Left bundle branch block    ECHO (07/20)  Left Ventricle  Normal cavity size. Mild concentric hypertrophy. Moderate-to-severe systolic dysfunction. The estimated ejection fraction is 20 - 25%. Visually measured ejection fraction. LV wall motion is noted to be hypokinetic. Atrial fibrillation observed. Wall Scoring  The left ventricular wall motion is globally hypokinetic. Left Atrium  Moderately dilated left atrium. Left Atrium volume index is 43.36 mL/m2. Right Ventricle  Moderately dilated right ventricle. Reduced systolic function. Right Atrium  Severely dilated right atrium.     Aortic Valve  Trileaflet valve structure and no regurgitation. There is moderate noncoronary and left leaflet calcification. There is mild aortic stenosis. Mitral Valve  No stenosis. Mitral valve non-specific thickening. Mild mitral annular calcification. Mild regurgitation. Tricuspid Valve  Normal valve structure and no stenosis. Mild regurgitation. Pulmonic Valve  Normal valve structure and no stenosis. Mild regurgitation. Aorta  Normal aortic root and ascending aorta. Pulmonary Artery  Pulmonary arterial systolic pressure (PASP) is 45 mmHg. Pulmonary hypertension found to be mild to moderate. STRESS TEST (EST, PHARM, NUC, ECHO etc)    CATHETERIZATION    IMPRESSION & PLAN:  Mr. Rigoberto Romero is 89-year male with multiple medical problem    Cardiomyopathy:  LVEF 30% by echo in April 2020. Most likely from A. fib and tachycardia. LVEF 25% in July 2020. Currently on Toprol twice daily  Continue with Lasix 40 mg daily and increase to twice a day as needed depending on edema and blood pressure and swelling. Currently stable  He is taking spironolactone every other day. We will repeat echo in 3 months after successful cardioversion. He is in sinus rhythm. If he has not improved, would need to consider ischemia evaluation. Currently no chest pain    Atrial fibrillation:  Paroxysmal in nature according to patient. Initially diagnosed in 2018. Had cardioversion successfully in 2018 and again in 2019. Status post successful cardioversion again in July 2020  EKG today suggests sinus bradycardia. Because of his fatigue and tiredness and as he has maintained sinus rhythm, will reduced Toprol to 25 mg twice daily  H/O amiodarone related eye and lung toxicity. Hypertension:  Continue current medications. Reducing beta-blocker dose because of fatigue and tiredness and sinus bradycardia. No presyncope or syncope    COPD, emphysema:  Defer to Dr. Gabriel Loera. This plan was discussed with patient who is in agreement.     Thank you for allowing me to participate in patient care. Please feel free to call me if you have any question or concern. Raquel Abad MD  Please note: This document has been produced using voice recognition software. Unrecognized errors in transcription may be present.

## 2020-08-05 ENCOUNTER — OFFICE VISIT (OUTPATIENT)
Dept: CARDIOLOGY CLINIC | Age: 85
End: 2020-08-05

## 2020-08-05 VITALS
WEIGHT: 238 LBS | BODY MASS INDEX: 31.54 KG/M2 | SYSTOLIC BLOOD PRESSURE: 148 MMHG | HEART RATE: 46 BPM | HEIGHT: 73 IN | OXYGEN SATURATION: 96 % | DIASTOLIC BLOOD PRESSURE: 68 MMHG

## 2020-08-05 DIAGNOSIS — I42.9 CARDIOMYOPATHY, UNSPECIFIED TYPE (HCC): ICD-10-CM

## 2020-08-05 DIAGNOSIS — R06.02 SOB (SHORTNESS OF BREATH): ICD-10-CM

## 2020-08-05 DIAGNOSIS — R00.1 BRADYCARDIA: ICD-10-CM

## 2020-08-05 DIAGNOSIS — I25.10 CORONARY ARTERY DISEASE INVOLVING NATIVE CORONARY ARTERY OF NATIVE HEART WITHOUT ANGINA PECTORIS: ICD-10-CM

## 2020-08-05 DIAGNOSIS — J44.9 CHRONIC OBSTRUCTIVE PULMONARY DISEASE, UNSPECIFIED COPD TYPE (HCC): ICD-10-CM

## 2020-08-05 DIAGNOSIS — I50.9 CONGESTIVE HEART FAILURE, UNSPECIFIED HF CHRONICITY, UNSPECIFIED HEART FAILURE TYPE (HCC): ICD-10-CM

## 2020-08-05 DIAGNOSIS — I48.91 ATRIAL FIBRILLATION, UNSPECIFIED TYPE (HCC): Primary | ICD-10-CM

## 2020-08-05 NOTE — PROGRESS NOTES
HPI: An 19-year-old male referred by Dr. Karina Warner for evaluation for paroxysmal atrial fibrillation. He has had cardioversion 2018, 2019, and just July 2020. Since his cardioversion he has felt a bit better. He was also given prednisone for COPD by Dr. Brigid Padgett. Today, he denies any new chest pain. He has chronic fatigue and his breathing overall is slightly improved. He has not had any syncope. His systolic blood pressure has been running a bit on the high side since his cardioversion. His heart rate is in the 40s today. He tells me it never goes above 60. He is accompanied by his wife and is here to discuss options. Impression/Plan:  1. Chronic systolic heart failure on diuretics. 2. Cardiomyopathy, EF between % since April. 3. Paroxysmal atrial fibrillation symptomatic with cardioversion 2018, 2019 and just July 2020. 4. At least moderate COPD followed by Dr. Brigid Padgett. He has some effusion and is currently on tapering steroids completed by end of the week. 5. History of AAA and endovascular repair. 6. Hypertension. 7. Chronic beta blocker and ARB use. 8. Life expectancy > one year. 9. Dyslipidemia. 10. History of degenerative joint disease and knee replacement. 11. Sleep apnea on CPAP. 12. BMI 31.  13. History of amiodarone pulmonary toxicity. I discussed multiple reasons for his dyspnea including congestive heart failure, atrial fibrillation, slow ventricular rate as well as increased rates at times, and his cardiomyopathy. I would recommend at this point an ischemia evaluation whether proceeding directly to heart catheterization or stress test. I will defer to Dr. Alfa Olivo. If he requires no intervention I would then offer him a biventricular AICD and up-titrate his beta blocker and if we are unable to control his rates, I would proceed to AV node ablation. He is not a candidate for pulmonary vein isolation.  Given his comorbidities, I would be hesitant to pursue any other antiarrhythmic for definitive treatment. All questions answered. Past Medical History:   Diagnosis Date    A-fib [facility])     S/P Cardioversion X 2 (2018. 2019)    Amiodarone pulmonary toxicity 2019    CAD (coronary artery disease)     Cardiomyopathy (HCC)     LVEF 25% (07/20) 30% (04/20)    COPD (chronic obstructive pulmonary disease) (HCC)     GERD (gastroesophageal reflux disease)     HTN (hypertension)     Sleep apnea     CPAP       Current Outpatient Medications   Medication Sig Dispense Refill    budesonide (PULMICORT) 0.5 mg/2 mL nbsp 500 mcg by Nebulization route.  budesonide-formoteroL (Symbicort) 160-4.5 mcg/actuation HFAA Take 2 Puffs by inhalation.  magnesium oxide (MAG-OX) 400 mg tablet Take 400 mg by mouth daily.  atorvastatin (Lipitor) 10 mg tablet Take  by mouth daily.  AZILSARTAN MEDOXOMIL PO Take  by mouth.  predniSONE (DELTASONE) 10 mg tablet Take 5 mg by mouth daily.  multivitamin (ONE A DAY) tablet Take 1 Tab by mouth daily.  cholecalciferol, vitamin D3, (VITAMIN D3 PO) Take  by mouth.  metoprolol succinate (TOPROL-XL) 25 mg XL tablet Take 2 Tabs by mouth two (2) times a day. (Patient taking differently: Take 25 mg by mouth two (2) times a day.) 60 Tab 5    melatonin 5 mg tablet Take  by mouth nightly as needed.  formoterol fumarate (PERFOROMIST IN) Take  by inhalation.  revefenacin (Yupelri) 175 mcg/3 mL nebu nebulizer solution 175 mcg by Nebulization route daily.  co-enzyme Q-10 (CoQ-10) 100 mg capsule Take 200 mg by mouth daily.  spironolactone (Aldactone) 25 mg tablet Take  by mouth.  furosemide (LASIX) 40 mg tablet TAKE 1 TABLET BY MOUTH EVERY DAY      rOPINIRole (REQUIP) 1 mg tablet TAKE 1 TABLET BY MOUTH EVERY DAY      olmesartan (Benicar) 40 mg tablet Take 40 mg by mouth daily.  apixaban (Eliquis) 5 mg tablet Take 5 mg by mouth two (2) times a day.          Social History   reports that he has quit smoking. He has never used smokeless tobacco.   reports current alcohol use of about 3.0 standard drinks of alcohol per week. Family History  family history is not on file. Review of Systems  Except as stated above include:  Constitutional: Negative for fever, chills and malaise/fatigue. HEENT: No congestion or recent URI. Gastrointestinal: No nausea, vomiting, abdominal pain, bloody stools. Pulmonary:  Negative except as stated above. Cardiac:  Negative except as stated above. Musculoskeletal: Negative except as stated above. Neurological:  No localized symptoms. Skin:  Negative except as stated above. Psych:  Negative except as stated above. Endocrine:  Negative except as stated above. PHYSICAL EXAM  BP Readings from Last 3 Encounters:   08/05/20 148/68   07/23/20 114/48   07/20/20 113/58     Pulse Readings from Last 3 Encounters:   08/05/20 (!) 46   07/23/20 (!) 54   07/20/20 65     Wt Readings from Last 3 Encounters:   08/05/20 108 kg (238 lb)   07/23/20 108 kg (238 lb)   07/20/20 104.5 kg (230 lb 4.8 oz)     General:   Well developed, well groomed. Head/Neck:   No jugular venous distention     No carotid bruits. No evidence of xanthelasma. Lungs:   No respiratory distress. Clear bilaterally. Heart:    Cristian Fish, regular. Normal S1/S2. Palpation of heart with normal point of maximum impulse. No significant murmurs, rubs or gallops. Abdomen:   Soft and nontender. No palpable abdominal mass or bruits. Extremities:   Intact peripheral pulses. No significant edema. Neurological:   Alert and oriented to person, place, time. No focal neurological deficit visually. Skin:   No obvious rash    Blood Pressure Metric:  Monitor recommended and adjustments stated if needed.

## 2020-08-05 NOTE — PROGRESS NOTES
Mackenzie Evans. presents today for   Chief Complaint   Patient presents with    New Patient     ref by Pina Melendrez for a consult for AFart Lombardo preferred language for health care discussion is english/other. Is someone accompanying this pt? Yes his wife    Is the patient using any DME equipment during [address]? cane    Depression Screening:  3 most recent PHQ Screens 8/5/2020   Little interest or pleasure in doing things Not at all   Feeling down, depressed, irritable, or hopeless Not at all   Total Score PHQ 2 0       Learning Assessment:  Learning Assessment 8/5/2020   PRIMARY LEARNER Patient   PRIMARY LANGUAGE ENGLISH   LEARNER PREFERENCE PRIMARY DEMONSTRATION   ANSWERED BY patient   RELATIONSHIP SELF       Abuse Screening:  Abuse Screening Questionnaire 8/5/2020   Do you ever feel afraid of your partner? N   Are you in a relationship with someone who physically or mentally threatens you? N   Is it safe for you to go home? Y       Fall Risk  Fall Risk Assessment, last 12 mths 8/5/2020   Able to walk? Yes   Fall in past 12 months? No   Fall with injury? -   Number of falls in past 12 months -   Fall Risk Score -       Pt currently taking Anticoagulant therapy? Eliquis 5 mg twice a day    Coordination of Care:  1. Have you been to the ER, urgent care clinic since your last visit? Hospitalized since your last visit? no    2. Have you seen or consulted any other health care providers outside of the [address] [place] [address] since your last visit? Include any pap smears or colon screening.  no

## 2020-08-25 ENCOUNTER — TELEPHONE (OUTPATIENT)
Dept: CARDIOLOGY CLINIC | Age: 85
End: 2020-08-25

## 2020-08-25 NOTE — TELEPHONE ENCOUNTER
PSR contacted patient and wife to reschedule appointment for 8/27 due to provider schedule. Wife has concerns with r/s to September due to planned conference in regard to possible Electrophysiological intervention performed by Dr Juanito Ordaz. Patient also has pending ECHO in October. Please contact patient to discuss future plan regarding care and timeframes.

## 2020-08-27 ENCOUNTER — OFFICE VISIT (OUTPATIENT)
Dept: CARDIOLOGY CLINIC | Age: 85
End: 2020-08-27

## 2020-08-27 VITALS
OXYGEN SATURATION: 96 % | HEART RATE: 51 BPM | SYSTOLIC BLOOD PRESSURE: 141 MMHG | HEIGHT: 73 IN | TEMPERATURE: 98.6 F | DIASTOLIC BLOOD PRESSURE: 62 MMHG | WEIGHT: 238 LBS | BODY MASS INDEX: 31.54 KG/M2

## 2020-08-27 DIAGNOSIS — R00.1 BRADYCARDIA: ICD-10-CM

## 2020-08-27 DIAGNOSIS — I48.91 ATRIAL FIBRILLATION, UNSPECIFIED TYPE (HCC): Primary | ICD-10-CM

## 2020-08-27 DIAGNOSIS — I50.9 CONGESTIVE HEART FAILURE, UNSPECIFIED HF CHRONICITY, UNSPECIFIED HEART FAILURE TYPE (HCC): ICD-10-CM

## 2020-08-27 RX ORDER — AMLODIPINE BESYLATE 10 MG/1
10 TABLET ORAL DAILY
COMMUNITY
End: 2021-11-18

## 2020-08-27 NOTE — PROGRESS NOTES
[facility]    Mr. George Gutierres is 80 y.o. 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 He underwent cardioversion since last visit. Since last visit, he feels like his energy level is better. He denies any presyncope or syncope. He denies any palpitation. He has seen Dr. Evy Eddy  Denies any nausea, vomiting, abdominal pain, fever, chills, sputum production. No hematuria or other bleeding complaints    Past Medical History:   Diagnosis Date    A-fib [facility])     S/P Cardioversion X 2 (2018. 2019)    Amiodarone pulmonary toxicity 2019    CAD (coronary artery disease)     Cardiomyopathy (HCC)     LVEF 25% (07/20) 30% (04/20)    COPD (chronic obstructive pulmonary disease) (HCC)     GERD (gastroesophageal reflux disease)     HTN (hypertension)     Sleep apnea     CPAP         Past Surgical History:   Procedure Laterality Date    CARDIAC SURG PROCEDURE UNLIST      ENDOGRAFT AORTA    HX CATARACT REMOVAL Bilateral     HX CERVICAL FUSION      HX KNEE REPLACEMENT Right     NEUROLOGICAL PROCEDURE UNLISTED      ND CARDIOVERSION ELECTIVE ARRHYTHMIA EXTERNAL N/A 7/20/2020    EP CARDIOVERSION performed by Rhina Odell MD at [facility] CATH LAB    VASCULAR SURGERY PROCEDURE UNLIST      STENT RENAL ARTERY       Current Outpatient Medications   Medication Sig    amLODIPine (Norvasc) 5 mg tablet Take 5 mg by mouth daily.  budesonide (PULMICORT) 0.5 mg/2 mL nbsp 500 mcg by Nebulization route.  budesonide-formoteroL (Symbicort) 160-4.5 mcg/actuation HFAA Take 2 Puffs by inhalation.  magnesium oxide (MAG-OX) 400 mg tablet Take 400 mg by mouth daily.  atorvastatin (Lipitor) 10 mg tablet Take  by mouth daily.  AZILSARTAN MEDOXOMIL PO Take  by mouth.     predniSONE (DELTASONE) 10 mg tablet Take 5 mg by mouth daily.  multivitamin (ONE A DAY) tablet Take 1 Tab by mouth daily.  cholecalciferol, vitamin D3, (VITAMIN D3 PO) Take  by mouth.  metoprolol succinate (TOPROL-XL) 25 mg XL tablet Take 2 Tabs by mouth two (2) times a day. (Patient taking differently: Take 25 mg by mouth two (2) times a day.)    melatonin 5 mg tablet Take  by mouth nightly as needed.  formoterol fumarate (PERFOROMIST IN) Take  by inhalation.  revefenacin (Yupelri) 175 mcg/3 mL nebu nebulizer solution 175 mcg by Nebulization route daily.  co-enzyme Q-10 (CoQ-10) 100 mg capsule Take 200 mg by mouth daily.  spironolactone (Aldactone) 25 mg tablet Take  by mouth.  furosemide (LASIX) 40 mg tablet TAKE 1 TABLET BY MOUTH EVERY DAY    rOPINIRole (REQUIP) 1 mg tablet TAKE 1 TABLET BY MOUTH EVERY DAY    olmesartan (Benicar) 20 mg tablet Take 20 mg by mouth daily.  apixaban (Eliquis) 5 mg tablet Take 5 mg by mouth two (2) times a day. No current facility-administered medications for this visit. Allergies and Sensitivities:  Allergies   Allergen Reactions    Amiodarone Other (comments)       Family History:  No family history on file. Social History:  Social History     Tobacco Use    Smoking status: Former Smoker    Smokeless tobacco: Never Used   Substance Use Topics    Alcohol use: Yes     Alcohol/week: 3.0 standard drinks     Types: 3 Glasses of wine per week    Drug use: Never     He  reports that he has quit smoking. He has never used smokeless tobacco.  He  reports current alcohol use of about 3.0 standard drinks of alcohol per week. Review of Systems:  Cardiac symptoms as noted above in HPI. All others negative. Denies fatigue, malaise, skin rash, joint pain, blurring vision, photophobia, neck pain, hemoptysis, chronic cough, nausea, vomiting, hematuria, burning micturition, BRBPR, chronic headaches.     Physical Exam:  BP Readings from Last 3 Encounters:   08/05/20 148/68   07/23/20 114/48 07/20/20 113/58         Pulse Readings from Last 3 Encounters:   08/05/20 (!) 46   07/23/20 (!) 54   07/20/20 65          Wt Readings from Last 3 Encounters:   08/05/20 238 lb (108 kg)   07/23/20 238 lb (108 kg)   07/20/20 230 lb 4.8 oz (104.5 kg)       Constitutional: Oriented to person, place, and time. HENT: Head: Normocephalic and atraumatic. Neck: No JVD present. Carotid bruit is not appreciated. Cardiovascular: Regular rhythm. No murmur, gallop or rubs appreciated  Lung: Breath sounds normal. No respiratory distress. No ronchi or rales appreciated  Abdominal: No tenderness. No rebound and no guarding. Musculoskeletal: There is trace+ ankle edema. No cynosis    Review of Data  LABS:   Lab Results   Component Value Date/Time    Sodium 133 (L) 07/23/2020 09:06 AM    Potassium 4.3 07/23/2020 09:06 AM    Chloride 96 (L) 07/23/2020 09:06 AM    CO2 31 07/23/2020 09:06 AM    Glucose 79 07/23/2020 09:06 AM    BUN 52 (H) 07/23/2020 09:06 AM    Creatinine 1.78 (H) 07/23/2020 09:06 AM     No flowsheet data found. Lab Results   Component Value Date/Time    ALT (SGPT) 53 06/08/2020 09:39 AM     No results found for: HBA1C, HGBE8, KQK0GPIR, WYV5JAYH, XDR1WADT    EKG  Atrial fibrillation with rapid ventricular response at heart rate of 120 bpm.  Left bundle branch block    ECHO (07/20)  Left Ventricle  Normal cavity size. Mild concentric hypertrophy. Moderate-to-severe systolic dysfunction. The estimated ejection fraction is 20 - 25%. Visually measured ejection fraction. LV wall motion is noted to be hypokinetic. Atrial fibrillation observed. Wall Scoring  The left ventricular wall motion is globally hypokinetic. Left Atrium  Moderately dilated left atrium. Left Atrium volume index is 43.36 mL/m2. Right Ventricle  Moderately dilated right ventricle. Reduced systolic function. Right Atrium  Severely dilated right atrium. Aortic Valve  Trileaflet valve structure and no regurgitation.  There is moderate noncoronary and left leaflet calcification. There is mild aortic stenosis. Mitral Valve  No stenosis. Mitral valve non-specific thickening. Mild mitral annular calcification. Mild regurgitation. Tricuspid Valve  Normal valve structure and no stenosis. Mild regurgitation. Pulmonic Valve  Normal valve structure and no stenosis. Mild regurgitation. Aorta  Normal aortic root and ascending aorta. Pulmonary Artery  Pulmonary arterial systolic pressure (PASP) is 45 mmHg. Pulmonary hypertension found to be mild to moderate. STRESS TEST (EST, PHARM, NUC, ECHO etc)    CATHETERIZATION    IMPRESSION & PLAN:  Mr. Mikey Sheffield is 80 y.o. with multiple medical problem    Cardiomyopathy:  LVEF 30% by echo in April 2020. Most likely from A. fib and tachycardia. LVEF 25% in July 2020. Currently on Toprol twice daily. Continue with Lasix 40 mg daily and increase to twice a day as needed depending on edema and blood pressure and swelling. Currently stable  He is taking spironolactone every other day. We will repeat echo in 2 months after successful cardioversion. He is in sinus rhythm. If he has not improved, would need to consider ischemia evaluation. Currently no chest pain    Atrial fibrillation:  Paroxysmal in nature according to patient. Initially diagnosed in 2018. Had cardioversion successfully in 2018 and again in 2019. Status post successful cardioversion again in July 2020  On exam he appears to be in sinus rhythm. Toprol dose has been reduced from 50 to 25 mg twice daily on last visit. Heart rate 52 today. Has seen electrophysiology colleague in July 2020. Suboptimal candidate for pulmonary vein isolation or antiarrhythmic medication. Rate control strategy has been recommended. Hypertension:  Continue current medications. COPD, emphysema:  Defer to Dr. Barber Barboza. This plan was discussed with patient who is in agreement.     Thank you for allowing me to participate in patient care. Please feel free to call me if you have any question or concern. Colby Mcdonald MD  Please note: This document has been produced using voice recognition software. Unrecognized errors in transcription may be present.

## 2020-08-27 NOTE — PATIENT INSTRUCTIONS
Testing Echocardiogram 
 
Please call [facility] scheduling at [phone]  to schedule an appointment. All testing is completed at [address] [facility], [facility] Make sure echo complete before next office appointment.

## 2020-08-27 NOTE — PROGRESS NOTES
Wes  presents today for   Chief Complaint   Patient presents with   [address] preferred language for health care discussion is english/other. Is someone accompanying this pt? Yes wife    Is the patient using any DME equipment during [address]? No     Depression Screening:  3 most recent PHQ Screens 8/27/2020   Little interest or pleasure in doing things Not at all   Feeling down, depressed, irritable, or hopeless Not at all   Total Score PHQ 2 0       Learning Assessment:  Learning Assessment 8/5/2020   PRIMARY LEARNER Patient   PRIMARY LANGUAGE ENGLISH   LEARNER PREFERENCE PRIMARY DEMONSTRATION   ANSWERED BY patient   RELATIONSHIP SELF       Abuse Screening:  Abuse Screening Questionnaire 8/5/2020   Do you ever feel afraid of your partner? N   Are you in a relationship with someone who physically or mentally threatens you? N   Is it safe for you to go home? Y       Fall Risk  Fall Risk Assessment, last 12 mths 8/5/2020   Able to walk? Yes   Fall in past 12 months? No   Fall with injury? -   Number of falls in past 12 months -   Fall Risk Score -       Pt currently taking Anticoagulant therapy? yes    Coordination of Care:  1. Have you been to the ER, urgent care clinic since your last visit? Hospitalized since your last visit? no    2. Have you seen or consulted any other health care providers outside of the [address] [place] [address] since your last visit? Include any pap smears or colon screening.  no

## 2020-09-04 ENCOUNTER — HOSPITAL ENCOUNTER (EMERGENCY)
Age: 85
Discharge: LEFT BEFORE DISCHARGE | End: 2020-09-04
Attending: EMERGENCY MEDICINE | Admitting: EMERGENCY MEDICINE
Payer: MEDICARE

## 2020-09-04 VITALS
HEART RATE: 87 BPM | RESPIRATION RATE: 18 BRPM | TEMPERATURE: 97.6 F | SYSTOLIC BLOOD PRESSURE: 150 MMHG | DIASTOLIC BLOOD PRESSURE: 54 MMHG

## 2020-09-04 PROCEDURE — 75810000275 HC EMERGENCY DEPT VISIT NO LEVEL OF CARE

## 2020-09-04 NOTE — ED TRIAGE NOTES
Nose bleed started this morning   Cauterized 2-3 times in the past.  States he blew his nose and then started bleeding on right side.   Not currently bleeding

## 2020-10-29 ENCOUNTER — TELEPHONE (OUTPATIENT)
Dept: CARDIOLOGY CLINIC | Age: 85
End: 2020-10-29

## 2020-10-29 NOTE — TELEPHONE ENCOUNTER
Spouse left message with answering service stating that patient is in [facility] and experiencing \"swelling of face and around eyes. \" Made Dr. Tarun Hagan aware of above. Returned call to spouse who states that patient also is experiencing a runny nose. Spouse encouraged to give patient an over the counter antihistamine and flonase nasal spray to see if symptom improves. Spouse made aware to take patient to the ER for evaluation if swelling worsens and if patient develops swelling in throat or tongue. This has been fully explained to the patient's spouse, who indicates understanding.

## 2020-11-20 ENCOUNTER — HOSPITAL ENCOUNTER (OUTPATIENT)
Dept: NON INVASIVE DIAGNOSTICS | Age: 85
Discharge: HOME | End: 2020-11-20
Attending: INTERNAL MEDICINE
Payer: MEDICARE

## 2020-11-20 VITALS
BODY MASS INDEX: 31.54 KG/M2 | DIASTOLIC BLOOD PRESSURE: 54 MMHG | SYSTOLIC BLOOD PRESSURE: 150 MMHG | WEIGHT: 238 LBS | HEIGHT: 73 IN

## 2020-11-20 DIAGNOSIS — I50.9 CONGESTIVE HEART FAILURE, UNSPECIFIED HF CHRONICITY, UNSPECIFIED HEART FAILURE TYPE (HCC): ICD-10-CM

## 2020-11-20 DIAGNOSIS — I48.91 ATRIAL FIBRILLATION, UNSPECIFIED TYPE (HCC): ICD-10-CM

## 2020-11-20 DIAGNOSIS — I25.10 CORONARY ARTERY DISEASE INVOLVING NATIVE CORONARY ARTERY OF NATIVE HEART WITHOUT ANGINA PECTORIS: ICD-10-CM

## 2020-11-20 LAB
ECHO IVC PROX: 2.64 CM
ECHO IVC SNIFF: 2.64 CM
ECHO PV REGURGITANT MAX VELOCITY: 264.39 CM/S
ECHO TV REGURGITANT MAX VELOCITY: 282 CM/S
ECHO TV REGURGITANT PEAK GRADIENT: 31.8 MMHG

## 2020-11-20 PROCEDURE — 93325 DOPPLER ECHO COLOR FLOW MAPG: CPT

## 2020-11-23 ENCOUNTER — OFFICE VISIT (OUTPATIENT)
Dept: CARDIOLOGY CLINIC | Age: 85
End: 2020-11-23
Payer: MEDICARE

## 2020-11-23 VITALS
TEMPERATURE: 98.1 F | DIASTOLIC BLOOD PRESSURE: 74 MMHG | OXYGEN SATURATION: 95 % | RESPIRATION RATE: 16 BRPM | HEART RATE: 52 BPM | WEIGHT: 236.2 LBS | SYSTOLIC BLOOD PRESSURE: 152 MMHG | HEIGHT: 73 IN | BODY MASS INDEX: 31.3 KG/M2

## 2020-11-23 DIAGNOSIS — I50.9 CONGESTIVE HEART FAILURE, UNSPECIFIED HF CHRONICITY, UNSPECIFIED HEART FAILURE TYPE (HCC): ICD-10-CM

## 2020-11-23 DIAGNOSIS — I48.91 ATRIAL FIBRILLATION, UNSPECIFIED TYPE (HCC): Primary | ICD-10-CM

## 2020-11-23 PROCEDURE — 99214 OFFICE O/P EST MOD 30 MIN: CPT | Performed by: INTERNAL MEDICINE

## 2020-11-23 PROCEDURE — G8417 CALC BMI ABV UP PARAM F/U: HCPCS | Performed by: INTERNAL MEDICINE

## 2020-11-23 PROCEDURE — G8432 DEP SCR NOT DOC, RNG: HCPCS | Performed by: INTERNAL MEDICINE

## 2020-11-23 PROCEDURE — G8536 NO DOC ELDER MAL SCRN: HCPCS | Performed by: INTERNAL MEDICINE

## 2020-11-23 PROCEDURE — G8428 CUR MEDS NOT DOCUMENT: HCPCS | Performed by: INTERNAL MEDICINE

## 2020-11-23 NOTE — PROGRESS NOTES
Carry Loach. presents today for   Chief Complaint   Patient presents with    Follow-up     ECHO       Carry Loach. preferred language for health care discussion is english/other. Personal Protective Equipment:   Personal Protective Equipment was used including: mask-surgical and hands-gloves. Patient was placed on no precaution(s). Patient was masked. Is someone accompanying this pt? Yes; Spouse Mrs. Beronica Neely    Is the patient using any DME equipment during OV? No    Depression Screening:  3 most recent PHQ Screens 8/27/2020   Little interest or pleasure in doing things Not at all   Feeling down, depressed, irritable, or hopeless Not at all   Total Score PHQ 2 0       Learning Assessment:  Learning Assessment 8/5/2020   PRIMARY LEARNER Patient   PRIMARY LANGUAGE ENGLISH   LEARNER PREFERENCE PRIMARY DEMONSTRATION   ANSWERED BY patient   RELATIONSHIP SELF       Abuse Screening:  Abuse Screening Questionnaire 8/5/2020   Do you ever feel afraid of your partner? N   Are you in a relationship with someone who physically or mentally threatens you? N   Is it safe for you to go home? Y       Fall Risk  Fall Risk Assessment, last 12 mths 8/5/2020   Able to walk? Yes   Fall in past 12 months? No   Fall with injury? -   Number of falls in past 12 months -   Fall Risk Score -       Pt currently taking Anticoagulant therapy? No    Coordination of Care:  1. Have you been to the ER, urgent care clinic since your last visit? Hospitalized since your last visit? No    2. Have you seen or consulted any other health care providers outside of the [address] [place] [address] since your last visit? Include any pap smears or colon screening.  No

## 2020-11-23 NOTE — PROGRESS NOTES
[facility]    Mr. Thais Soliman is 80 y.o. 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 He denies any specific cardiac complaint. He has been checking his blood pressure at home regularly and his blood pressure is running high. He has been undergoing a lot of stress because of his son's alcohol problem. He denies any presyncope or syncope. He has stable dyspnea. He denies any symptoms of angina. Denies any nausea, vomiting, abdominal pain, fever, chills, sputum production. No hematuria or other bleeding complaints  Patient has appointment with [facility] vascular this for his PAD    Past Medical History:   Diagnosis Date    A-fib [facility])     S/P Cardioversion X 2 (2018. 2019)    Amiodarone pulmonary toxicity 2019    CAD (coronary artery disease)     Cardiomyopathy (HCC)     LVEF 25% (07/20) 30% (04/20)    COPD (chronic obstructive pulmonary disease) (HCC)     GERD (gastroesophageal reflux disease)     HTN (hypertension)     Sleep apnea     CPAP         Past Surgical History:   Procedure Laterality Date    CARDIAC SURG PROCEDURE UNLIST      ENDOGRAFT AORTA    HX CATARACT REMOVAL Bilateral     HX CERVICAL FUSION      HX KNEE REPLACEMENT Right     NEUROLOGICAL PROCEDURE UNLISTED      MO CARDIOVERSION ELECTIVE ARRHYTHMIA EXTERNAL N/A 7/20/2020    EP CARDIOVERSION performed by Rose Mendez [facility] at [facility] CATH LAB    VASCULAR SURGERY PROCEDURE UNLIST      STENT RENAL ARTERY       Current Outpatient Medications   Medication Sig    amLODIPine (Norvasc) 5 mg tablet Take 5 mg by mouth daily.  budesonide (PULMICORT) 0.5 mg/2 mL nbsp 500 mcg by Nebulization route.  budesonide-formoteroL (Symbicort) 160-4.5 mcg/actuation HFAA Take 2 Puffs by inhalation.     magnesium oxide (MAG-OX) 400 mg tablet Take 400 mg by mouth daily.  atorvastatin (Lipitor) 10 mg tablet Take  by mouth daily.  AZILSARTAN MEDOXOMIL PO Take  by mouth.  predniSONE (DELTASONE) 10 mg tablet Take 5 mg by mouth daily.  multivitamin (ONE A DAY) tablet Take 1 Tab by mouth daily.  cholecalciferol, vitamin D3, (VITAMIN D3 PO) Take  by mouth.  metoprolol succinate (TOPROL-XL) 25 mg XL tablet Take 2 Tabs by mouth two (2) times a day. (Patient taking differently: Take 25 mg by mouth two (2) times a day.)    melatonin 5 mg tablet Take  by mouth nightly as needed.  formoterol fumarate (PERFOROMIST IN) Take  by inhalation.  revefenacin (Yupelri) 175 mcg/3 mL nebu nebulizer solution 175 mcg by Nebulization route daily.  co-enzyme Q-10 (CoQ-10) 100 mg capsule Take 200 mg by mouth daily.  spironolactone (Aldactone) 25 mg tablet Take  by mouth.  furosemide (LASIX) 40 mg tablet TAKE 1 TABLET BY MOUTH EVERY DAY    rOPINIRole (REQUIP) 1 mg tablet TAKE 1 TABLET BY MOUTH EVERY DAY    olmesartan (Benicar) 20 mg tablet Take 20 mg by mouth daily.  apixaban (Eliquis) 5 mg tablet Take 5 mg by mouth two (2) times a day. No current facility-administered medications for this visit. Allergies and Sensitivities:  Allergies   Allergen Reactions    Amiodarone Other (comments)       Family History:  No family history on file. Social History:  Social History     Tobacco Use    Smoking status: Former Smoker    Smokeless tobacco: Never Used   Substance Use Topics    Alcohol use: Yes     Alcohol/week: 3.0 standard drinks     Types: 3 Glasses of wine per week    Drug use: Never     He  reports that he has quit smoking. He has never used smokeless tobacco.  He  reports current alcohol use of about 3.0 standard drinks of alcohol per week. Review of Systems:  Cardiac symptoms as noted above in HPI. All others negative.     Physical Exam:  BP Readings from Last 3 Encounters:   11/20/20 (!) 150/54   08/27/20 141/62 08/05/20 148/68         Pulse Readings from Last 3 Encounters:   08/27/20 (!) 51   08/05/20 (!) 46   07/23/20 (!) 54          Wt Readings from Last 3 Encounters:   11/20/20 238 lb (108 kg)   08/27/20 238 lb (108 kg)   08/05/20 238 lb (108 kg)       Constitutional: Oriented to person, place, and time. HENT: Head: Normocephalic and atraumatic. Neck: No JVD present. Carotid bruit is not appreciated. Cardiovascular: Regular rhythm. No murmur, gallop or rubs appreciated  Lung: Breath sounds normal. No respiratory distress. No ronchi or rales appreciated  Abdominal: No tenderness. No rebound and no guarding. Musculoskeletal: There is trace+ ankle edema. No cynosis    Review of Data  LABS:   Lab Results   Component Value Date/Time    Sodium 133 (L) 07/23/2020 09:06 AM    Potassium 4.3 07/23/2020 09:06 AM    Chloride 96 (L) 07/23/2020 09:06 AM    CO2 31 07/23/2020 09:06 AM    Glucose 79 07/23/2020 09:06 AM    BUN 52 (H) 07/23/2020 09:06 AM    Creatinine 1.78 (H) 07/23/2020 09:06 AM     No flowsheet data found. Lab Results   Component Value Date/Time    ALT (SGPT) 53 06/08/2020 09:39 AM     No results found for: HBA1C, HGBE8, LYJ2TMOI, NFT0TZUE, WQR6TRPD    EKG  Atrial fibrillation with rapid ventricular response at heart rate of 120 bpm.  Left bundle branch block    ECHO (07/20)  Left Ventricle  Normal cavity size. Mild concentric hypertrophy. Moderate-to-severe systolic dysfunction. The estimated ejection fraction is 20 - 25%. Visually measured ejection fraction. LV wall motion is noted to be hypokinetic. Atrial fibrillation observed. Wall Scoring  The left ventricular wall motion is globally hypokinetic. ECHO (11/20)  Left Ventricle  Normal wall thickness. Mildly dilated left ventricle. The calculated EF is 35%. Visually measured ejection fraction. Moderate-to-severely reduced systolic function. Unable to assess diastolic function.     Wall Scoring  The left ventricular wall motion is globally hypokinetic. Right Ventricle  Normal cavity size. Borderline low systolic function. Aortic Valve  Trileaflet valve structure. There is leaflet calcification. There is mild aortic stenosis. Trace aortic valve regurgitation. Mitral Valve  No stenosis. Mitral valve non-specific thickening. Mild mitral annular calcification. Trace regurgitation. Tricuspid Valve  Normal valve structure and no stenosis. Mild regurgitation. Pulmonic Valve  Normal valve structure and no stenosis. Trace regurgitation. Pulmonary Artery  Pulmonary arterial systolic pressure (PASP) is 40 mmHg. Pulmonary hypertension found to be mild. IMPRESSION & PLAN:  Mr. Melissa Diaz is 80 y.o. with multiple medical problem    Cardiomyopathy:  LVEF 30% by echo in April 2020. Most likely from A. fib and tachycardia. LVEF 25% in July 2020. Underwent cardioversion for atrial fibrillation successfully in July 2020  LVEF 35% in November 2020  Currently on Toprol twice daily. Continue diuretics. Atrial fibrillation:  Paroxysmal in nature according to patient. Initially diagnosed in 2018. Had cardioversion successfully in 2018 and again in 2019. Status post successful cardioversion again in July 2020  On exam he appears to be in sinus rhythm. Heart rate 52 today. Has seen electrophysiology colleague in July 2020. Suboptimal candidate for pulmonary vein isolation or antiarrhythmic medication. Rate control strategy has been recommended. Hypertension:  BP elevated. Will increase amlodipine dose to 10 mg daily. Continue rest of the medication. BP check along with his machine at home in 2 weeks in the clinic    COPD, emphysema:  Defer to Dr. Clifford Patel. This plan was discussed with patient who is in agreement. Thank you for allowing me to participate in patient care. Please feel free to call me if you have any question or concern. Socorro Talbot MD  Please note:  This document has been produced using voice recognition software. Unrecognized errors in transcription may be present.

## 2020-11-23 NOTE — PATIENT INSTRUCTIONS
Check Out Instructions Increase amlodipine to 10 mg daily 2 Week BP check - bring home monitor if you have one to this appt

## 2020-11-23 NOTE — LETTER
11/23/20 Patient: Val Segundo. YOB: 1931 Date of Visit: 11/23/2020 Perla Taylor MD 
[id number] VIA In Basket Dear Perla Taylor MD, Thank you for referring Mr. Sanket Diez to [address] [place] [address] for evaluation. My notes for this consultation are attached. If you have questions, please do not hesitate to call me. I look forward to following your patient along with you. Sincerely, Elbert Swain MD

## 2020-12-03 ENCOUNTER — CLINICAL SUPPORT (OUTPATIENT)
Dept: CARDIOLOGY CLINIC | Age: 85
End: 2020-12-03

## 2020-12-03 VITALS — HEART RATE: 61 BPM | DIASTOLIC BLOOD PRESSURE: 66 MMHG | SYSTOLIC BLOOD PRESSURE: 183 MMHG

## 2020-12-03 DIAGNOSIS — I10 HYPERTENSION, UNSPECIFIED TYPE: Primary | ICD-10-CM

## 2020-12-03 NOTE — PROGRESS NOTES
Laura Kunz is a 80 y.o. male that is here for a blood pressure check. His current medications are listed below. Current Outpatient Medications   Medication Sig    amLODIPine (Norvasc) 10 mg tablet Take 10 mg by mouth daily.  budesonide (PULMICORT) 0.5 mg/2 mL nbsp 500 mcg by Nebulization route.  budesonide-formoteroL (Symbicort) 160-4.5 mcg/actuation HFAA Take 2 Puffs by inhalation.  magnesium oxide (MAG-OX) 400 mg tablet Take 400 mg by mouth daily.  atorvastatin (Lipitor) 10 mg tablet Take  by mouth daily.  AZILSARTAN MEDOXOMIL PO Take  by mouth.  predniSONE (DELTASONE) 10 mg tablet Take 5 mg by mouth daily.  multivitamin (ONE A DAY) tablet Take 1 Tab by mouth daily.  cholecalciferol, vitamin D3, (VITAMIN D3 PO) Take  by mouth.  metoprolol succinate (TOPROL-XL) 25 mg XL tablet Take 2 Tabs by mouth two (2) times a day. (Patient taking differently: Take 25 mg by mouth two (2) times a day.)    melatonin 5 mg tablet Take  by mouth nightly as needed.  formoterol fumarate (PERFOROMIST IN) Take  by inhalation.  revefenacin (Yupelri) 175 mcg/3 mL nebu nebulizer solution 175 mcg by Nebulization route daily.  co-enzyme Q-10 (CoQ-10) 100 mg capsule Take 200 mg by mouth daily.  spironolactone (Aldactone) 25 mg tablet Take  by mouth.  furosemide (LASIX) 40 mg tablet TAKE 1 TABLET BY MOUTH EVERY DAY    rOPINIRole (REQUIP) 1 mg tablet TAKE 1 TABLET BY MOUTH EVERY DAY    olmesartan (Benicar) 40 mg tablet Take 40 mg by mouth daily.  apixaban (Eliquis) 5 mg tablet Take 5 mg by mouth two (2) times a day. No current facility-administered medications for this visit.                 His   Visit Vitals  BP (!) 183/66 (BP 1 Location: Right arm, BP Patient Position: Sitting)   Pulse 61         Verbal order and read back per BP/EKG NORF CSI  Increase Benicar to 40 mg daily and return in 2 weeks for BP check

## 2020-12-11 RX ORDER — CLONIDINE HYDROCHLORIDE 0.1 MG/1
0.1 TABLET ORAL DAILY
Qty: 90 TAB | Refills: 3 | Status: SHIPPED | OUTPATIENT
Start: 2020-12-11 | End: 2021-01-07 | Stop reason: ALTCHOICE

## 2020-12-11 RX ORDER — OLMESARTAN MEDOXOMIL 40 MG/1
40 TABLET ORAL DAILY
Qty: 90 TAB | Refills: 3 | Status: SHIPPED | OUTPATIENT
Start: 2020-12-11 | End: 2021-11-18

## 2020-12-22 ENCOUNTER — OFFICE VISIT (OUTPATIENT)
Dept: CARDIOLOGY CLINIC | Age: 85
End: 2020-12-22

## 2020-12-22 VITALS — SYSTOLIC BLOOD PRESSURE: 157 MMHG | DIASTOLIC BLOOD PRESSURE: 47 MMHG | HEART RATE: 42 BPM

## 2020-12-22 DIAGNOSIS — I10 HYPERTENSION, UNSPECIFIED TYPE: Primary | ICD-10-CM

## 2020-12-22 NOTE — PROGRESS NOTES
Lauren Fox is a 80 y.o. male that is here for a blood pressure check. His current medications are listed below. Current Outpatient Medications   Medication Sig    apixaban (Eliquis) 5 mg tablet Take 1 Tab by mouth two (2) times a day.  olmesartan (Benicar) 40 mg tablet Take 1 Tab by mouth daily.  cloNIDine HCL (CATAPRES) 0.1 mg tablet Take 1 Tab by mouth daily. Take when systolic is greater than 833 only.  amLODIPine (Norvasc) 10 mg tablet Take 10 mg by mouth daily.  budesonide (PULMICORT) 0.5 mg/2 mL nbsp 500 mcg by Nebulization route.  budesonide-formoteroL (Symbicort) 160-4.5 mcg/actuation HFAA Take 2 Puffs by inhalation.  magnesium oxide (MAG-OX) 400 mg tablet Take 400 mg by mouth daily.  atorvastatin (Lipitor) 10 mg tablet Take  by mouth daily.  multivitamin (ONE A DAY) tablet Take 1 Tab by mouth daily.  cholecalciferol, vitamin D3, (VITAMIN D3 PO) Take  by mouth.  metoprolol succinate (TOPROL-XL) 25 mg XL tablet Take 2 Tabs by mouth two (2) times a day. (Patient taking differently: Take 25 mg by mouth two (2) times a day.)    melatonin 5 mg tablet Take  by mouth nightly as needed.  formoterol fumarate (PERFOROMIST IN) Take  by inhalation.  revefenacin (Yupelri) 175 mcg/3 mL nebu nebulizer solution 175 mcg by Nebulization route daily.  co-enzyme Q-10 (CoQ-10) 100 mg capsule Take 200 mg by mouth daily.  spironolactone (Aldactone) 25 mg tablet Take  by mouth.  furosemide (LASIX) 40 mg tablet TAKE 1 TABLET BY MOUTH EVERY DAY    rOPINIRole (REQUIP) 1 mg tablet TAKE 1 TABLET BY MOUTH EVERY DAY     No current facility-administered medications for this visit.                 His   Visit Vitals  BP (!) 157/47   Pulse (!) 42           Verbal order and read back per Kadi Massey MD  Increase clonidine 1.0 mg to bid

## 2020-12-22 NOTE — PATIENT INSTRUCTIONS
Clonidine 0.1 mg -Take twice daily. Hold if systolic( top number) is below 120. Monitor BP before medication and two hrs after medication.

## 2020-12-28 ENCOUNTER — HOSPITAL ENCOUNTER (OUTPATIENT)
Dept: LAB | Age: 85
Discharge: HOME | End: 2020-12-28

## 2020-12-28 LAB — SENTARA SPECIMEN COL,SENBCF: NORMAL

## 2020-12-28 PROCEDURE — 99001 SPECIMEN HANDLING PT-LAB: CPT

## 2021-01-06 ENCOUNTER — TELEPHONE (OUTPATIENT)
Dept: CARDIOLOGY CLINIC | Age: 86
End: 2021-01-06

## 2021-01-06 NOTE — TELEPHONE ENCOUNTER
Incoming from pts wife. Two patient Identifiers confirmed. Advised pts Bp was 208/105 P51. Pts bp while on phone was  177/61 P 44. Pts wife pt has been taking clonidine 0.1 mg bid, metoprolol 25 mg bid, and benicar 40 mg daily. She stated pt did have some dizziness this am. Advised I would review with Dr Jarred Hutton and advise.

## 2021-01-07 ENCOUNTER — OFFICE VISIT (OUTPATIENT)
Dept: CARDIOLOGY CLINIC | Age: 86
End: 2021-01-07
Payer: MEDICARE

## 2021-01-07 VITALS
TEMPERATURE: 97.8 F | BODY MASS INDEX: 31.07 KG/M2 | OXYGEN SATURATION: 99 % | WEIGHT: 234.4 LBS | RESPIRATION RATE: 16 BRPM | DIASTOLIC BLOOD PRESSURE: 48 MMHG | SYSTOLIC BLOOD PRESSURE: 145 MMHG | HEIGHT: 73 IN | HEART RATE: 40 BPM

## 2021-01-07 DIAGNOSIS — I48.91 ATRIAL FIBRILLATION, UNSPECIFIED TYPE (HCC): Primary | ICD-10-CM

## 2021-01-07 PROCEDURE — G8417 CALC BMI ABV UP PARAM F/U: HCPCS | Performed by: INTERNAL MEDICINE

## 2021-01-07 PROCEDURE — G8428 CUR MEDS NOT DOCUMENT: HCPCS | Performed by: INTERNAL MEDICINE

## 2021-01-07 PROCEDURE — 93000 ELECTROCARDIOGRAM COMPLETE: CPT | Performed by: INTERNAL MEDICINE

## 2021-01-07 PROCEDURE — 99214 OFFICE O/P EST MOD 30 MIN: CPT | Performed by: INTERNAL MEDICINE

## 2021-01-07 PROCEDURE — 1101F PT FALLS ASSESS-DOCD LE1/YR: CPT | Performed by: INTERNAL MEDICINE

## 2021-01-07 PROCEDURE — G8432 DEP SCR NOT DOC, RNG: HCPCS | Performed by: INTERNAL MEDICINE

## 2021-01-07 PROCEDURE — G8536 NO DOC ELDER MAL SCRN: HCPCS | Performed by: INTERNAL MEDICINE

## 2021-01-07 RX ORDER — CLOPIDOGREL BISULFATE 75 MG/1
75 TABLET ORAL
COMMUNITY
End: 2021-12-06

## 2021-01-07 RX ORDER — HYDRALAZINE HYDROCHLORIDE 25 MG/1
25 TABLET, FILM COATED ORAL 3 TIMES DAILY
Qty: 90 TAB | Refills: 3 | Status: SHIPPED | OUTPATIENT
Start: 2021-01-07 | End: 2021-02-25 | Stop reason: DRUGHIGH

## 2021-01-07 RX ORDER — METOPROLOL SUCCINATE 25 MG/1
25 TABLET, EXTENDED RELEASE ORAL DAILY
COMMUNITY
End: 2021-08-16 | Stop reason: SDUPTHER

## 2021-01-07 NOTE — TELEPHONE ENCOUNTER
Unable to get a response from provider yesterday due to provider not in office.  Pt scheduled for appt with provider this am.

## 2021-01-07 NOTE — TELEPHONE ENCOUNTER
PCP: Meredith Meraz MD    Last appt: 1/7/2021  No future appointments. Requested Prescriptions     Pending Prescriptions Disp Refills    apixaban (Eliquis) 5 mg tablet 180 Tab 3     Sig: Take 1 Tab by mouth two (2) times a day.

## 2021-01-07 NOTE — PROGRESS NOTES
Eve Bone. presents today for   Chief Complaint   Patient presents with    Follow-up     BP concerns       Joel Skinner. preferred language for health care discussion is english/other. Personal Protective Equipment:   Personal Protective Equipment was used including: mask-surgical and hands-gloves. Patient was placed on no precaution(s). Patient was masked. Is someone accompanying this pt? Yes; Spouse Mrs. Katherine Jones    Is the patient using any DME equipment during OV? Yes; Cane    Depression Screening:  3 most recent PHQ Screens 12/22/2020   Little interest or pleasure in doing things Not at all   Feeling down, depressed, irritable, or hopeless Not at all   Total Score PHQ 2 0       Learning Assessment:  Learning Assessment 8/5/2020   PRIMARY LEARNER Patient   PRIMARY LANGUAGE ENGLISH   LEARNER PREFERENCE PRIMARY DEMONSTRATION   ANSWERED BY patient   RELATIONSHIP SELF       Abuse Screening:  Abuse Screening Questionnaire 8/5/2020   Do you ever feel afraid of your partner? N   Are you in a relationship with someone who physically or mentally threatens you? N   Is it safe for you to go home? Y       Fall Risk  Fall Risk Assessment, last 12 mths 8/5/2020   Able to walk? Yes   Fall in past 12 months? No   Number of falls in past 12 months -   Fall with injury? -       Pt currently taking Anticoagulant therapy? No    Coordination of Care:  1. Have you been to the ER, urgent care clinic since your last visit? Hospitalized since your last visit? No    2. Have you seen or consulted any other health care providers outside of the [address] [place] [address] since your last visit? Include any pap smears or colon screening.  No

## 2021-01-07 NOTE — PROGRESS NOTES
Cardiovascular Specialists    Mr. Hollie Allen is 80 y.o. 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 He denies any symptoms concerning for angina or heart failure. He complains of fatigue and tiredness. Denies presyncope or syncope. His blood pressure lately has been running high. Blood pressure recording at home shows BP as high as 775 systolic    Past Medical History:   Diagnosis Date    A-fib [facility])     S/P Cardioversion X 2 (2018. 2019)    AAA (abdominal aortic aneurysm) ([facility] [address] 2013    S/P Endovascular graft repair    Amiodarone pulmonary toxicity 2019    CAD (coronary artery disease)     Cardiomyopathy (HCC)     LVEF 35(11/20) 25% (07/20) 30% (04/20)    COPD (chronic obstructive pulmonary disease) (HCC)     GERD (gastroesophageal reflux disease)     HTN (hypertension)     Sleep apnea     CPAP         Past Surgical History:   Procedure Laterality Date    HX CATARACT REMOVAL Bilateral     HX CERVICAL FUSION      HX KNEE REPLACEMENT Right     NEUROLOGICAL PROCEDURE UNLISTED      CA CARDIAC SURG PROCEDURE UNLIST      ENDOGRAFT AORTA    CA CARDIOVERSION ELECTIVE ARRHYTHMIA EXTERNAL N/A 7/20/2020    EP CARDIOVERSION performed by Shannon Pitt MD at [facility] CATH LAB    VASCULAR SURGERY PROCEDURE UNLIST      STENT RENAL ARTERY       Current Outpatient Medications   Medication Sig    clopidogreL (Plavix) 75 mg tab Take  by mouth.  apixaban (Eliquis) 5 mg tablet Take 1 Tab by mouth two (2) times a day.  olmesartan (Benicar) 40 mg tablet Take 1 Tab by mouth daily.  cloNIDine HCL (CATAPRES) 0.1 mg tablet Take 1 Tab by mouth daily. Take when systolic is greater than 649 only. (Patient taking differently: Take 0.1 mg by mouth two (2) times a day. Take when systolic is greater than 446 only.  Hold if systolic( top number) is below 120.)    amLODIPine (Norvasc) 10 mg tablet Take 10 mg by mouth daily.  budesonide (PULMICORT) 0.5 mg/2 mL nbsp 500 mcg by Nebulization route.  budesonide-formoteroL (Symbicort) 160-4.5 mcg/actuation HFAA Take 2 Puffs by inhalation.  magnesium oxide (MAG-OX) 400 mg tablet Take 400 mg by mouth daily.  multivitamin (ONE A DAY) tablet Take 1 Tab by mouth daily.  cholecalciferol, vitamin D3, (VITAMIN D3 PO) Take  by mouth.  metoprolol succinate (TOPROL-XL) 25 mg XL tablet Take 2 Tabs by mouth two (2) times a day. (Patient taking differently: Take 25 mg by mouth two (2) times a day.)    melatonin 5 mg tablet Take  by mouth nightly as needed.  formoterol fumarate (PERFOROMIST IN) Take  by inhalation.  revefenacin (Yupelri) 175 mcg/3 mL nebu nebulizer solution 175 mcg by Nebulization route daily.  co-enzyme Q-10 (CoQ-10) 100 mg capsule Take 200 mg by mouth daily.  spironolactone (Aldactone) 25 mg tablet Take  by mouth.  furosemide (LASIX) 40 mg tablet TAKE 1 TABLET BY MOUTH EVERY DAY    rOPINIRole (REQUIP) 1 mg tablet TAKE 1 TABLET BY MOUTH EVERY DAY    atorvastatin (Lipitor) 10 mg tablet Take  by mouth daily. No current facility-administered medications for this visit. Allergies and Sensitivities:  Allergies   Allergen Reactions    Amiodarone Other (comments)       Family History:  No family history on file. Social History:  Social History     Tobacco Use    Smoking status: Former Smoker    Smokeless tobacco: Never Used   Substance Use Topics    Alcohol use: Yes     Alcohol/week: 3.0 standard drinks     Types: 3 Glasses of wine per week    Drug use: Never     He  reports that he has quit smoking. He has never used smokeless tobacco.  He  reports current alcohol use of about 3.0 standard drinks of alcohol per week. Review of Systems:  Cardiac symptoms as noted above in HPI. All others negative.     Physical Exam:  BP Readings from Last 3 Encounters:   01/07/21 (!) 145/48   12/22/20 (!) 157/47   12/03/20 (!) 183/66         Pulse Readings from Last 3 Encounters:   01/07/21 (!) 40   12/22/20 (!) 42   12/03/20 61          Wt Readings from Last 3 Encounters:   01/07/21 234 lb 6.4 oz (106.3 kg)   11/23/20 236 lb 3.2 oz (107.1 kg)   11/20/20 238 lb (108 kg)       Constitutional: Oriented to person, place, and time. HENT: Head: Normocephalic and atraumatic. Neck: No JVD present. Carotid bruit is not appreciated. Cardiovascular: Regular rhythm. No murmur, gallop or rubs appreciated  Lung: Breath sounds normal. No respiratory distress. No ronchi or rales appreciated  Abdominal: No tenderness. No rebound and no guarding. Musculoskeletal: There is no + ankle edema. No cynosis    Review of Data  LABS:   Lab Results   Component Value Date/Time    Sodium 133 (L) 07/23/2020 09:06 AM    Potassium 4.3 07/23/2020 09:06 AM    Chloride 96 (L) 07/23/2020 09:06 AM    CO2 31 07/23/2020 09:06 AM    Glucose 79 07/23/2020 09:06 AM    BUN 52 (H) 07/23/2020 09:06 AM    Creatinine 1.78 (H) 07/23/2020 09:06 AM     No flowsheet data found. Lab Results   Component Value Date/Time    ALT (SGPT) 53 06/08/2020 09:39 AM     No results found for: HBA1C, HGBE8, LMM1GHJS, ZTI0MRHP, YGQ6XCNU    EKG  Atrial fibrillation with rapid ventricular response at heart rate of 120 bpm.  Left bundle branch block    ECHO (07/20)  Left Ventricle  Normal cavity size. Mild concentric hypertrophy. Moderate-to-severe systolic dysfunction. The estimated ejection fraction is 20 - 25%. Visually measured ejection fraction. LV wall motion is noted to be hypokinetic. Atrial fibrillation observed. Wall Scoring  The left ventricular wall motion is globally hypokinetic. ECHO (11/20)  Left Ventricle  Normal wall thickness. Mildly dilated left ventricle. The calculated EF is 35%. Visually measured ejection fraction. Moderate-to-severely reduced systolic function.  Unable to assess diastolic function. Wall Scoring  The left ventricular wall motion is globally hypokinetic. Right Ventricle  Normal cavity size. Borderline low systolic function. Aortic Valve  Trileaflet valve structure. There is leaflet calcification. There is mild aortic stenosis. Trace aortic valve regurgitation. Mitral Valve  No stenosis. Mitral valve non-specific thickening. Mild mitral annular calcification. Trace regurgitation. Tricuspid Valve  Normal valve structure and no stenosis. Mild regurgitation. Pulmonic Valve  Normal valve structure and no stenosis. Trace regurgitation. Pulmonary Artery  Pulmonary arterial systolic pressure (PASP) is 40 mmHg. Pulmonary hypertension found to be mild. IMPRESSION & PLAN:  Mr. Jaylyn Billings is 80 y.o. with multiple medical problem    Cardiomyopathy:  LVEF 30% by echo in April 2020. Most likely from A. fib and tachycardia. LVEF 25% in July 2020. Underwent cardioversion for atrial fibrillation successfully in July 2020  LVEF 35% in November 2020  Currently on Toprol twice daily. Will reduce Toprol to once daily because of sinus bradycardia and some fatigue and tiredness. Continue diuretics. No significant fluid overload on exam    Atrial fibrillation:  Paroxysmal in nature according to patient. Initially diagnosed in 2018. Had cardioversion successfully in 2018 and again in 2019. Status post successful cardioversion again in July 2020  On exam by EKG he appears to be in sinus rhythm. Heart rate 40 today. This could be because of metoprolol and clonidine. Will reduce Toprol to 25 mg daily from twice daily. I will discontinue clonidine as it can cause bradycardia. Will use hydralazine for blood pressure  Has seen electrophysiology colleague in July 2020. Suboptimal candidate for pulmonary vein isolation or antiarrhythmic medication. Rate control strategy has been recommended. Hypertension:  Continue Benicar and amlodipine.   Also taking Toprol 25 mg twice daily and clonidine 0.1 mg twice daily. Heart rate 40 today. This could be because of metoprolol and clonidine. Will reduce Toprol to 25 mg daily from twice daily. I will discontinue clonidine as it can cause bradycardia. Will use hydralazine for blood pressure, 25 mg 3 times daily and uptitrate as needed. They will keep checking blood pressure at home    COPD, emphysema:  Defer to Dr. Calvin Santos. This plan was discussed with patient who is in agreement. Thank you for allowing me to participate in patient care. Please feel free to call me if you have any question or concern. Ramy Bhatti MD  Please note: This document has been produced using voice recognition software. Unrecognized errors in transcription may be present.

## 2021-01-07 NOTE — PATIENT INSTRUCTIONS
Medication changes Start Hydralazine 25 mg three times daily Start metoprolol 25 mg Xl once daily Stop Clonidine

## 2021-01-08 NOTE — TELEPHONE ENCOUNTER
----- Message from Jose Garcia MD sent at 1/8/2021  9:15 AM EST -----  Hello,    Can you please talk to him about why he is taking Eliquis and Plavix both. He is too old for taking both  I am not sure why he is on Plavix unless he has any coronary stent. I do not have that information.   If he can talk to him when you have a time and figure out for me please    Thank you

## 2021-01-08 NOTE — TELEPHONE ENCOUNTER
Contacted pts wife at [facility] number. Two patient Identifiers confirmed. Advised pts wife per Dr Eusebia Desai. Pts wife stated Marlee Billings is taking both. \" She stated the vascular provider in [facility] placed endographs in his aorta. When they relocated here [facility] He advised the eliquis was for the heart but the plavix was for the aorta and graph issue. She stated they only see him once yearly. \" Advised I would forward the request to Dr Eusebia Desai for review.

## 2021-01-11 ENCOUNTER — TELEPHONE (OUTPATIENT)
Dept: CARDIOLOGY CLINIC | Age: 86
End: 2021-01-11

## 2021-01-11 NOTE — TELEPHONE ENCOUNTER
Verbal order and read back per Carey Jenkins MD   Start Hydralazine 50 mg TID;   Advised pt that I will contact vascular team I do not believe that he needs to be on plavix anymore just eliquis

## 2021-01-11 NOTE — TELEPHONE ENCOUNTER
Patient spouse contacted the office and stated that since the patient medication, clonidine, has been discontinued, the blood pressure readings have increased. Patient BP readings on Saturday:   214/81 with pulse of 51  223/90 with pulse of 60.  221/85 with pulse of 59. Patient blood pressure this morning was 191/77 with pulse of 51. Patient is taking the correct medication and takes Benicar, waits one hour, takes hydralazine, waits one hour and takes metoprolol in the morning. Patient spouse stated that his BP has increased before time to take next HTN medication. Please advise. Thank you.

## 2021-01-11 NOTE — TELEPHONE ENCOUNTER
Contacted pts wife at [facility] number. Two patient Identifiers confirmed. Advised pt per Dr Vieyra Postin wife. Pts wife  verbalized understanding.

## 2021-01-15 NOTE — TELEPHONE ENCOUNTER
Contacted Dr Shaheen Monsalve. Two patient Identifiers confirmed. Advised per Dr Jayla Hernandez he would like pt to d/c plavix and possibly take aspirin 81 mg daily. Nurse stated it could never be stopped no matter how long ago the stent was but she would forward the message for review. Will await response.

## 2021-02-12 ENCOUNTER — TELEPHONE (OUTPATIENT)
Dept: CARDIOLOGY CLINIC | Age: 86
End: 2021-02-12

## 2021-02-12 NOTE — TELEPHONE ENCOUNTER
Patient spouse contacted the office and stated that since the patient medication, hydralazine, has been increased, the blood pressure readings have increased. Patient BP readings:  180/68   179/87   204/79   Patient is taking the correct medication and takes Benicar, takes 2 hydralazine's x 3 times per day, amlodipine and metoprolol. Patient spouse stated that his BP has increased before time to take next HTN medication. Please advise.  Thank you

## 2021-02-12 NOTE — TELEPHONE ENCOUNTER
Verbal order and read back per Suyapa Ferrer MD  Increase hydralazine to 75 mg tid; increase water intake and exercise

## 2021-02-12 NOTE — TELEPHONE ENCOUNTER
Attempted to contact pt at  number, no answer. Lvm for pt to return call to office at [phone]. Will continue to try to contact pt.        Re; did pt ever schedule an appt with Dr Garth Holman regarding Plavix

## 2021-02-15 NOTE — TELEPHONE ENCOUNTER
Attempted to contact pt at  number, no answer. Vm not set up yet. Will continue to try to contact pt.

## 2021-02-16 NOTE — TELEPHONE ENCOUNTER
Contacted pts wife at cell number. Two patient Identifiers confirmed. Advised pt per Dr Jovita Tolentino. 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 She advised that they were told to f/u in 1 year. She stated the cardiologist in [place], [place] had d/c plavix but when they saw Dr Krystina Santiago he put him back on medication. Will review with Dr Jovita Tolentino.

## 2021-02-16 NOTE — TELEPHONE ENCOUNTER
Attempted to contact  Dr Short Copping office  at  number, no answer. Lvm  Was calling to have providers consult on pts care regarding plavix on 2/18/21 at 11:30-11:45 am.  Advised to contact office and advised it a consult could be scheduled. Will await return call.

## 2021-02-18 NOTE — TELEPHONE ENCOUNTER
Contacted Cat. Two patient Identifiers confirmed. Advised provider was awaiting call today from Dr Ruth [address] office. Per nurse she attempted to contact office and advised he would be in surgery today. She stated Friday was a better day.  Rescheduled conference to Friday at 1 pm.

## 2021-02-23 NOTE — TELEPHONE ENCOUNTER
Incoming from Dr [employer] office spoke with Cat. Two patient identifiers confirmed. She stated it was better to have Dr Armando Chino call Dr Lyn Parada directly due to his schedule . Requested providers contact number. Number given. Will await response from provider and advise accordingly. No other issues noted.

## 2021-02-23 NOTE — TELEPHONE ENCOUNTER
Contacted pts wife at [facility] number. Two patient Identifiers confirmed. Advised pt per Dr Lore Torres notes. Pt verbalized understanding.

## 2021-02-23 NOTE — TELEPHONE ENCOUNTER
Verbal order and read back per Jose Garcia MD  Spoke with with Dr Jae Timmons . He advised he would like plavix and eliquis for patient. He stated he did not want patient to take aspirin instead of the plavix. If pt is in need of refills for plavix I will defer to his office for refill.

## 2021-02-23 NOTE — TELEPHONE ENCOUNTER
Attempted to contact providers office at number, no answer. [facility] for return call to office at [phone] to schedule a consult with Dr Jarred Hutton regarding pts medication. Will await return call.

## 2021-02-25 ENCOUNTER — OFFICE VISIT (OUTPATIENT)
Dept: CARDIOLOGY CLINIC | Age: 86
End: 2021-02-25
Payer: MEDICARE

## 2021-02-25 VITALS
TEMPERATURE: 97.9 F | OXYGEN SATURATION: 95 % | HEART RATE: 54 BPM | SYSTOLIC BLOOD PRESSURE: 125 MMHG | RESPIRATION RATE: 16 BRPM | DIASTOLIC BLOOD PRESSURE: 43 MMHG | BODY MASS INDEX: 31.62 KG/M2 | WEIGHT: 238.6 LBS | HEIGHT: 73 IN

## 2021-02-25 DIAGNOSIS — I10 ESSENTIAL HYPERTENSION WITH GOAL BLOOD PRESSURE LESS THAN 140/90: Primary | ICD-10-CM

## 2021-02-25 DIAGNOSIS — I42.0 DILATED CARDIOMYOPATHY (HCC): ICD-10-CM

## 2021-02-25 DIAGNOSIS — I48.0 PAF (PAROXYSMAL ATRIAL FIBRILLATION) (HCC): ICD-10-CM

## 2021-02-25 PROCEDURE — G8432 DEP SCR NOT DOC, RNG: HCPCS | Performed by: INTERNAL MEDICINE

## 2021-02-25 PROCEDURE — 99214 OFFICE O/P EST MOD 30 MIN: CPT | Performed by: INTERNAL MEDICINE

## 2021-02-25 PROCEDURE — G8536 NO DOC ELDER MAL SCRN: HCPCS | Performed by: INTERNAL MEDICINE

## 2021-02-25 PROCEDURE — G8417 CALC BMI ABV UP PARAM F/U: HCPCS | Performed by: INTERNAL MEDICINE

## 2021-02-25 PROCEDURE — 1101F PT FALLS ASSESS-DOCD LE1/YR: CPT | Performed by: INTERNAL MEDICINE

## 2021-02-25 PROCEDURE — G8428 CUR MEDS NOT DOCUMENT: HCPCS | Performed by: INTERNAL MEDICINE

## 2021-02-25 RX ORDER — HYDRALAZINE HYDROCHLORIDE 100 MG/1
100 TABLET, FILM COATED ORAL 3 TIMES DAILY
Qty: 90 TAB | Refills: 3 | Status: CANCELLED | OUTPATIENT
Start: 2021-02-25

## 2021-02-25 NOTE — LETTER
2/25/2021 Patient: Ellen Ching YOB: 1931 Date of Visit: 2/25/2021 Geovany Lawson MD 
[address] [id number] [address] [employer] Dear Geovany Lawson MD, Thank you for referring Mr. Marisol Rosario to [address] [place] [address] for evaluation. My notes for this consultation are attached. If you have questions, please do not hesitate to call me. I look forward to following your patient along with you. Sincerely, Wicho Clayton MD

## 2021-02-25 NOTE — PROGRESS NOTES
Internal Medicine Joseph Garcia. presents today for   Chief Complaint   Patient presents with    Surgical Clearance       Joel Calloway. preferred language for health care discussion is english/other. Personal Protective Equipment:   Personal Protective Equipment was used including: mask-surgical and hands-gloves. Patient was placed on no precaution(s). Patient was masked. Is someone accompanying this pt? Yes; Spouse Mrs. Burrell Gist    Is the patient using any DME equipment during OV? Yes; Cane    Depression Screening:  3 most recent PHQ Screens 12/22/2020   Little interest or pleasure in doing things Not at all   Feeling down, depressed, irritable, or hopeless Not at all   Total Score PHQ 2 0       Learning Assessment:  Learning Assessment 8/5/2020   PRIMARY LEARNER Patient   PRIMARY LANGUAGE ENGLISH   LEARNER PREFERENCE PRIMARY DEMONSTRATION   ANSWERED BY patient   RELATIONSHIP SELF       Abuse Screening:  Abuse Screening Questionnaire 8/5/2020   Do you ever feel afraid of your partner? N   Are you in a relationship with someone who physically or mentally threatens you? N   Is it safe for you to go home? Y       Fall Risk  Fall Risk Assessment, last 12 mths 8/5/2020   Able to walk? Yes   Fall in past 12 months? No   Number of falls in past 12 months -   Fall with injury? -       Pt currently taking Anticoagulant therapy? No    Coordination of Care:  1. Have you been to the ER, urgent care clinic since your last visit? Hospitalized since your last visit? No    2. Have you seen or consulted any other health care providers outside of the [address] [place] [address] since your last visit? Include any pap smears or colon screening.  No

## 2021-02-25 NOTE — PROGRESS NOTES
[facility]    Mr. Hollie Allen is 80 y.o. 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 Patient has been diagnosed with skin cancer and is planning to have surgery. He was asked to come see me. Overall his blood pressure has been fluctuating between 087705 systolic. He is taking all his medications regularly. He denies any falls or unsteady gait. He denies any presyncope or syncope. He denies any symptoms to suggest angina or heart failure. His main concern is lack of energy. Past Medical History:   Diagnosis Date    A-fib [facility])     S/P Cardioversion X 2 (2018. 2019)    AAA (abdominal aortic aneurysm) ([address] 2013    S/P Endovascular graft repair    Amiodarone pulmonary toxicity 2019    CAD (coronary artery disease)     Cardiomyopathy (HCC)     LVEF 35(11/20) 25% (07/20) 30% (04/20)    COPD (chronic obstructive pulmonary disease) (HCC)     GERD (gastroesophageal reflux disease)     HTN (hypertension)     Sleep apnea     CPAP         Past Surgical History:   Procedure Laterality Date    HX CATARACT REMOVAL Bilateral     HX CERVICAL FUSION      HX KNEE REPLACEMENT Right     NEUROLOGICAL PROCEDURE UNLISTED      MO CARDIAC SURG PROCEDURE UNLIST      ENDOGRAFT AORTA    MO CARDIOVERSION ELECTIVE ARRHYTHMIA EXTERNAL N/A 7/20/2020    EP CARDIOVERSION performed by Shannon Pitt MD at [facility] CATH LAB    VASCULAR SURGERY PROCEDURE UNLIST      STENT RENAL ARTERY       Current Outpatient Medications   Medication Sig    clopidogreL (Plavix) 75 mg tab Take  by mouth.  metoprolol succinate (TOPROL-XL) 25 mg XL tablet Take 25 mg by mouth daily.  hydrALAZINE (APRESOLINE) 25 mg tablet Take 1 Tab by mouth three (3) times daily.  (Patient taking differently: Take 25 mg by mouth three (3) times daily. Take 3 tablets three times a day.)    apixaban (Eliquis) 5 mg tablet Take 1 Tab by mouth two (2) times a day.  olmesartan (Benicar) 40 mg tablet Take 1 Tab by mouth daily.  amLODIPine (Norvasc) 10 mg tablet Take 10 mg by mouth daily.  budesonide (PULMICORT) 0.5 mg/2 mL nbsp 500 mcg by Nebulization route.  budesonide-formoteroL (Symbicort) 160-4.5 mcg/actuation HFAA Take 2 Puffs by inhalation.  magnesium oxide (MAG-OX) 400 mg tablet Take 400 mg by mouth daily.  atorvastatin (Lipitor) 10 mg tablet Take  by mouth daily.  multivitamin (ONE A DAY) tablet Take 1 Tab by mouth daily.  cholecalciferol, vitamin D3, (VITAMIN D3 PO) Take  by mouth.  melatonin 5 mg tablet Take  by mouth nightly as needed.  formoterol fumarate (PERFOROMIST IN) Take  by inhalation.  revefenacin (Yupelri) 175 mcg/3 mL nebu nebulizer solution 175 mcg by Nebulization route daily.  co-enzyme Q-10 (CoQ-10) 100 mg capsule Take 200 mg by mouth daily.  spironolactone (Aldactone) 25 mg tablet Take  by mouth.  furosemide (LASIX) 40 mg tablet TAKE 1 TABLET BY MOUTH EVERY DAY    rOPINIRole (REQUIP) 1 mg tablet TAKE 1 TABLET BY MOUTH EVERY DAY     No current facility-administered medications for this visit. Allergies and Sensitivities:  Allergies   Allergen Reactions    Amiodarone Other (comments)       Family History:  No family history on file. Social History:  Social History     Tobacco Use    Smoking status: Former Smoker    Smokeless tobacco: Never Used   Substance Use Topics    Alcohol use: Yes     Alcohol/week: 3.0 standard drinks     Types: 3 Glasses of wine per week    Drug use: Never     He  reports that he has quit smoking. He has never used smokeless tobacco.  He  reports current alcohol use of about 3.0 standard drinks of alcohol per week. Review of Systems:  Cardiac symptoms as noted above in HPI. All others negative.     Physical Exam:  BP Readings from Last 3 Encounters: 02/25/21 (!) 125/43   01/07/21 (!) 145/48   12/22/20 (!) 157/47         Pulse Readings from Last 3 Encounters:   02/25/21 (!) 54   01/07/21 (!) 40   12/22/20 (!) 42          Wt Readings from Last 3 Encounters:   02/25/21 238 lb 9.6 oz (108.2 kg)   01/07/21 234 lb 6.4 oz (106.3 kg)   11/23/20 236 lb 3.2 oz (107.1 kg)       Constitutional: Oriented to person, place, and time. HENT: Head: Normocephalic and atraumatic. Neck: No JVD present. Carotid bruit is not appreciated. Cardiovascular: Regular rhythm. No murmur, gallop or rubs appreciated  Lung: Breath sounds normal. No respiratory distress. No ronchi or rales appreciated  Abdominal: No tenderness. No rebound and no guarding. Musculoskeletal: There is no + ankle edema. No cynosis    Review of Data  LABS:   Lab Results   Component Value Date/Time    Sodium 133 (L) 07/23/2020 09:06 AM    Potassium 4.3 07/23/2020 09:06 AM    Chloride 96 (L) 07/23/2020 09:06 AM    CO2 31 07/23/2020 09:06 AM    Glucose 79 07/23/2020 09:06 AM    BUN 52 (H) 07/23/2020 09:06 AM    Creatinine 1.78 (H) 07/23/2020 09:06 AM     No flowsheet data found. Lab Results   Component Value Date/Time    ALT (SGPT) 53 06/08/2020 09:39 AM     No results found for: HBA1C, HGBE8, QLO0RJYH, PWV9DXOI, TIJ0OHRV    EKG  Atrial fibrillation with rapid ventricular response at heart rate of 120 bpm.  Left bundle branch block    ECHO (07/20)  Left Ventricle  Normal cavity size. Mild concentric hypertrophy. Moderate-to-severe systolic dysfunction. The estimated ejection fraction is 20 - 25%. Visually measured ejection fraction. LV wall motion is noted to be hypokinetic. Atrial fibrillation observed. Wall Scoring  The left ventricular wall motion is globally hypokinetic. ECHO (11/20)  Left Ventricle  Normal wall thickness. Mildly dilated left ventricle. The calculated EF is 35%. Visually measured ejection fraction. Moderate-to-severely reduced systolic function.  Unable to assess diastolic function. Wall Scoring  The left ventricular wall motion is globally hypokinetic. Right Ventricle  Normal cavity size. Borderline low systolic function. Aortic Valve  Trileaflet valve structure. There is leaflet calcification. There is mild aortic stenosis. Trace aortic valve regurgitation. Mitral Valve  No stenosis. Mitral valve non-specific thickening. Mild mitral annular calcification. Trace regurgitation. Tricuspid Valve  Normal valve structure and no stenosis. Mild regurgitation. Pulmonic Valve  Normal valve structure and no stenosis. Trace regurgitation. Pulmonary Artery  Pulmonary arterial systolic pressure (PASP) is 40 mmHg. Pulmonary hypertension found to be mild. IMPRESSION & PLAN:  Mr. Ramesh Sue is 80 y.o. with multiple medical problem    Cardiomyopathy:  LVEF 30% by echo in April 2020. Most likely from A. fib and tachycardia. LVEF 25% in July 2020. Underwent cardioversion for atrial fibrillation successfully in July 2020  LVEF 35% in November 2020  Currently on Toprol. Continue same. Heart rate 54 bpm    Atrial fibrillation:  Paroxysmal in nature according to patient. Initially diagnosed in 2018. Had cardioversion successfully in 2018 and again in 2019. Status post successful cardioversion again in July 2020  On exam by EKG he appears to be in sinus rhythm. Heart rate 54 today. Continue low-dose of beta-blocker. Patient is currently taking Plavix and also on Eliquis. Because of his age more than [de-identified] and his creatinine more than 1.5, I will reduce dose of Eliquis to 2.5 mg twice daily. I discussed with Dr. Kaveh Israel from vascular surgery team and he would like for patient to continue Plavix despite being on Eliquis. I have discussed this with patient  Has seen electrophysiology colleague in July 2020. Suboptimal candidate for pulmonary vein isolation or antiarrhythmic medication.   Rate control strategy has been recommended. Hypertension:  Continue Benicar and amlodipine. Blood pressure fluctuating at home. Will increase hydralazine 200 mg 3 times a day. Continue rest of the medications. COPD, emphysema:  Defer to Dr. Luiza Anthony. Preoperative evaluation:  Patient is planning to have surgery skin cancer on the ear and neck. Be outpatient. Patient Does not have any unstable coronary symptoms or decompensated heart failure. I would recommend to continue perioperative cardiac medication as tolerated. No further cardiac work-up is necessary at this time for skin cancer surgery. This plan was discussed with patient who is in agreement. Thank you for allowing me to participate in patient care. Please feel free to call me if you have any question or concern. Vicki Leyva MD  Please note: This document has been produced using voice recognition software. Unrecognized errors in transcription may be present.

## 2021-02-25 NOTE — PATIENT INSTRUCTIONS
Medication Changes Increase hydralazine 100 mg three times a day Decrease eliquis to 2.5 mg twice a day

## 2021-03-11 NOTE — TELEPHONE ENCOUNTER
PCP: Dale Siddiqui MD    Last appt: 2/25/2021  No future appointments. Requested Prescriptions     Pending Prescriptions Disp Refills    furosemide (LASIX) 40 mg tablet          Request for a 30 or 90 day supply? Provider Discretion    Pharmacy: Confirmed    Other Comments:  Medication refill request via phone.

## 2021-03-12 RX ORDER — FUROSEMIDE 40 MG/1
40 TABLET ORAL DAILY
Qty: 90 TAB | Refills: 3 | Status: SHIPPED | OUTPATIENT
Start: 2021-03-12 | End: 2022-02-01

## 2021-04-27 ENCOUNTER — OFFICE VISIT (OUTPATIENT)
Dept: CARDIOLOGY CLINIC | Age: 86
End: 2021-04-27
Payer: MEDICARE

## 2021-04-27 VITALS
HEIGHT: 73 IN | TEMPERATURE: 97.7 F | BODY MASS INDEX: 31.41 KG/M2 | DIASTOLIC BLOOD PRESSURE: 68 MMHG | HEART RATE: 61 BPM | WEIGHT: 237 LBS | SYSTOLIC BLOOD PRESSURE: 180 MMHG | OXYGEN SATURATION: 95 % | RESPIRATION RATE: 18 BRPM

## 2021-04-27 DIAGNOSIS — I48.0 PAF (PAROXYSMAL ATRIAL FIBRILLATION) (HCC): Primary | ICD-10-CM

## 2021-04-27 PROCEDURE — G8510 SCR DEP NEG, NO PLAN REQD: HCPCS | Performed by: INTERNAL MEDICINE

## 2021-04-27 PROCEDURE — 1101F PT FALLS ASSESS-DOCD LE1/YR: CPT | Performed by: INTERNAL MEDICINE

## 2021-04-27 PROCEDURE — 99213 OFFICE O/P EST LOW 20 MIN: CPT | Performed by: INTERNAL MEDICINE

## 2021-04-27 PROCEDURE — G8417 CALC BMI ABV UP PARAM F/U: HCPCS | Performed by: INTERNAL MEDICINE

## 2021-04-27 PROCEDURE — G8536 NO DOC ELDER MAL SCRN: HCPCS | Performed by: INTERNAL MEDICINE

## 2021-04-27 PROCEDURE — 93000 ELECTROCARDIOGRAM COMPLETE: CPT | Performed by: INTERNAL MEDICINE

## 2021-04-27 PROCEDURE — G8428 CUR MEDS NOT DOCUMENT: HCPCS | Performed by: INTERNAL MEDICINE

## 2021-04-27 RX ORDER — HYDRALAZINE HYDROCHLORIDE 25 MG/1
75 TABLET, FILM COATED ORAL 3 TIMES DAILY
Qty: 270 TAB | Refills: 5 | Status: SHIPPED | OUTPATIENT
Start: 2021-04-27 | End: 2021-11-01

## 2021-04-27 NOTE — PROGRESS NOTES
Erwinta Smith. presents today for   Chief Complaint   Patient presents with    Follow-up     htn       Elta Smith. preferred language for health care discussion is english/other. Personal Protective Equipment:   Personal Protective Equipment was used including: mask-surgical and hands-gloves. Patient was placed on no precaution(s). Patient was masked. Precautions:   Patient currently on None  Patient currently roomed with door closed    Is someone accompanying this pt? yes    Is the patient using any DME equipment during [address]? no    Depression Screening:  3 most recent PHQ Screens 4/27/2021   Little interest or pleasure in doing things Not at all   Feeling down, depressed, irritable, or hopeless Not at all   Total Score PHQ 2 0       Learning Assessment:  Learning Assessment 8/5/2020   PRIMARY LEARNER Patient   PRIMARY LANGUAGE ENGLISH   LEARNER PREFERENCE PRIMARY DEMONSTRATION   ANSWERED BY patient   RELATIONSHIP SELF       Abuse Screening:  Abuse Screening Questionnaire 8/5/2020   Do you ever feel afraid of your partner? N   Are you in a relationship with someone who physically or mentally threatens you? N   Is it safe for you to go home? Y       Fall Risk  Fall Risk Assessment, last 12 mths 4/27/2021   Able to walk? Yes   Fall in past 12 months? 0   Do you feel unsteady? 0   Are you worried about falling 0   Number of falls in past 12 months -   Fall with injury? -       Pt currently taking Anticoagulant therapy? yes    Coordination of Care:  1. Have you been to the ER, urgent care clinic since your last visit? Hospitalized since your last visit? no    2. Have you seen or consulted any other health care providers outside of the [address] [place] [address] since your last visit? Include any pap smears or colon screening.  no

## 2021-04-27 NOTE — PROGRESS NOTES
[facility]    Mr. Christianne Vasquez is 80 y.o. 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 Continues to have a fluctuating blood pressure between 1 96601 systolic. Now taking hydralazine 50 mg 3 times a day. Taking Benicar, amlodipine and Toprol-XL. Denies any presyncope or syncope or any significant palpitation  Planning to have carotid ultrasound and follow-up with vascular surgery team.  Taking medication without any bleeding side effects    Past Medical History:   Diagnosis Date    A-fib [facility])     S/P Cardioversion X 2 (2018. 2019)    AAA (abdominal aortic aneurysm) ([address] 2013    S/P Endovascular graft repair    Amiodarone pulmonary toxicity 2019    CAD (coronary artery disease)     Cardiomyopathy (HCC)     LVEF 35(11/20) 25% (07/20) 30% (04/20)    COPD (chronic obstructive pulmonary disease) (HCC)     GERD (gastroesophageal reflux disease)     HTN (hypertension)     Sleep apnea     CPAP         Past Surgical History:   Procedure Laterality Date    HX CATARACT REMOVAL Bilateral     HX CERVICAL FUSION      HX KNEE REPLACEMENT Right     NEUROLOGICAL PROCEDURE UNLISTED      AK CARDIAC SURG PROCEDURE UNLIST      ENDOGRAFT AORTA    AK CARDIOVERSION ELECTIVE ARRHYTHMIA EXTERNAL N/A 7/20/2020    EP CARDIOVERSION performed by Pradeep Bass [facility] at [facility] CATH LAB    VASCULAR SURGERY PROCEDURE UNLIST      STENT RENAL ARTERY       Current Outpatient Medications   Medication Sig    furosemide (LASIX) 40 mg tablet Take 1 Tab by mouth daily.  clopidogreL (Plavix) 75 mg tab Take  by mouth.  metoprolol succinate (TOPROL-XL) 25 mg XL tablet Take 25 mg by mouth daily.  olmesartan (Benicar) 40 mg tablet Take 1 Tab by mouth daily.     amLODIPine (Norvasc) 10 mg tablet Take 10 mg by mouth daily.    budesonide (PULMICORT) 0.5 mg/2 mL nbsp 500 mcg by Nebulization route.  budesonide-formoteroL (Symbicort) 160-4.5 mcg/actuation HFAA Take 2 Puffs by inhalation.  magnesium oxide (MAG-OX) 400 mg tablet Take 400 mg by mouth daily.  atorvastatin (Lipitor) 10 mg tablet Take  by mouth daily.  multivitamin (ONE A DAY) tablet Take 1 Tab by mouth daily.  cholecalciferol, vitamin D3, (VITAMIN D3 PO) Take  by mouth.  melatonin 5 mg tablet Take  by mouth nightly as needed.  formoterol fumarate (PERFOROMIST IN) Take  by inhalation.  revefenacin (Yupelri) 175 mcg/3 mL nebu nebulizer solution 175 mcg by Nebulization route daily.  co-enzyme Q-10 (CoQ-10) 100 mg capsule Take 200 mg by mouth daily.  spironolactone (Aldactone) 25 mg tablet Take  by mouth.  rOPINIRole (REQUIP) 1 mg tablet TAKE 1 TABLET BY MOUTH EVERY DAY     No current facility-administered medications for this visit. Allergies and Sensitivities:  Allergies   Allergen Reactions    Amiodarone Other (comments)       Family History:  No family history on file. Social History:  Social History     Tobacco Use    Smoking status: Former Smoker    Smokeless tobacco: Never Used   Substance Use Topics    Alcohol use: Yes     Alcohol/week: 3.0 standard drinks     Types: 3 Glasses of wine per week    Drug use: Never     He  reports that he has quit smoking. He has never used smokeless tobacco.  He  reports current alcohol use of about 3.0 standard drinks of alcohol per week. Review of Systems:  Cardiac symptoms as noted above in HPI. All others negative.     Physical Exam:  BP Readings from Last 3 Encounters:   04/27/21 (!) 180/68   02/25/21 (!) 125/43   01/07/21 (!) 145/48         Pulse Readings from Last 3 Encounters:   04/27/21 61   02/25/21 (!) 54   01/07/21 (!) 40          Wt Readings from Last 3 Encounters:   04/27/21 237 lb (107.5 kg)   02/25/21 238 lb 9.6 oz (108.2 kg)   01/07/21 234 lb 6.4 oz (106.3 kg) Constitutional: Oriented to person, place, and time. HENT: Head: Normocephalic and atraumatic. Neck: No JVD present. Carotid bruit is not appreciated. Cardiovascular: Regular rhythm. No murmur, gallop or rubs appreciated  Lung: Breath sounds normal. No respiratory distress. No ronchi or rales appreciated  Abdominal: No tenderness. No rebound and no guarding. Musculoskeletal: There is no + ankle edema. No cynosis    Review of Data  LABS:   Lab Results   Component Value Date/Time    Sodium 133 (L) 07/23/2020 09:06 AM    Potassium 4.3 07/23/2020 09:06 AM    Chloride 96 (L) 07/23/2020 09:06 AM    CO2 31 07/23/2020 09:06 AM    Glucose 79 07/23/2020 09:06 AM    BUN 52 (H) 07/23/2020 09:06 AM    Creatinine 1.78 (H) 07/23/2020 09:06 AM     No flowsheet data found. Lab Results   Component Value Date/Time    ALT (SGPT) 53 06/08/2020 09:39 AM     No results found for: HBA1C, HGBE8, YYF4GEPS, TFN4GQQM, YOB4MROH    EKG  Atrial fibrillation with rapid ventricular response at heart rate of 120 bpm.  Left bundle branch block    ECHO (07/20)  Left Ventricle  Normal cavity size. Mild concentric hypertrophy. Moderate-to-severe systolic dysfunction. The estimated ejection fraction is 20 - 25%. Visually measured ejection fraction. LV wall motion is noted to be hypokinetic. Atrial fibrillation observed. Wall Scoring  The left ventricular wall motion is globally hypokinetic. ECHO (11/20)  Left Ventricle  Normal wall thickness. Mildly dilated left ventricle. The calculated EF is 35%. Visually measured ejection fraction. Moderate-to-severely reduced systolic function. Unable to assess diastolic function. Wall Scoring  The left ventricular wall motion is globally hypokinetic. Right Ventricle  Normal cavity size. Borderline low systolic function. Aortic Valve  Trileaflet valve structure. There is leaflet calcification. There is mild aortic stenosis. Trace aortic valve regurgitation. Mitral Valve  No stenosis. Mitral valve non-specific thickening. Mild mitral annular calcification. Trace regurgitation. Tricuspid Valve  Normal valve structure and no stenosis. Mild regurgitation. Pulmonic Valve  Normal valve structure and no stenosis. Trace regurgitation. Pulmonary Artery  Pulmonary arterial systolic pressure (PASP) is 40 mmHg. Pulmonary hypertension found to be mild. IMPRESSION & PLAN:  Mr. Chantale Cao is 80 y.o. with multiple medical problem    Cardiomyopathy:  LVEF 30% by echo in April 2020. Most likely from A. fib and tachycardia. LVEF 25% in July 2020. Underwent cardioversion for atrial fibrillation successfully in July 2020  LVEF 35% in November 2020  Currently on Toprol. Continue same. Heart rate 54 bpm    Atrial fibrillation:  Paroxysmal in nature according to patient. Initially diagnosed in 2018. Had cardioversion successfully in 2018 and again in 2019. Status post successful cardioversion again in July 2020  On exam by EKG he appears to be in sinus rhythm. Heart rate 67 today. Continue low-dose of beta-blocker. Patient is currently taking Plavix and also on Eliquis. I discussed with Dr. Dewayne Crawford from vascular surgery team in past and he would like for patient to continue Plavix despite being on Eliquis. I have discussed this with patient during this visit. I explained that from cardiovascular standpoint, Eliquis alone is enough however because of vascular disease vascular surgery team would like to have him on both medications. Has seen electrophysiology colleague in July 2020. Suboptimal candidate for pulmonary vein isolation or antiarrhythmic medication. Rate control strategy has been recommended. HLD:  Intolerance to statin with leg cramps and itching and skin rash. Tried 4 different statins in past.  Will recommend Rapatha as allergy to statin in setting of PAD and CAD.     Hypertension:  Continue Benicar and amlodipine and Toprol  Blood pressure fluctuating at home. Taking hydralazine 50 mg  3 times a day. Increase hydralazine to 75 mg 3 times a day. COPD, emphysema:  Defer to Dr. Janusz Anne. This plan was discussed with patient who is in agreement. Thank you for allowing me to participate in patient care. Please feel free to call me if you have any question or concern. Edison Wylie MD  Please note: This document has been produced using voice recognition software. Unrecognized errors in transcription may be present.

## 2021-04-27 NOTE — LETTER
4/27/2021 Patient: Meliton Blum. YOB: 1931 Date of Visit: 4/27/2021 Eusebio Oakes MD 
[address] [id number] [address] [employer] Dear Eusebio Oakes MD, Thank you for referring Mr. Bashir Kim to [address] [place] [address] for evaluation. My notes for this consultation are attached. If you have questions, please do not hesitate to call me. I look forward to following your patient along with you. Sincerely, Miriam Benson MD

## 2021-05-10 ENCOUNTER — HOSPITAL ENCOUNTER (OUTPATIENT)
Dept: LAB | Age: 86
Discharge: HOME | End: 2021-05-10
Payer: MEDICARE

## 2021-05-10 LAB
ANION GAP SERPL CALC-SCNC: 6 MMOL/L (ref 3–18)
BUN SERPL-MCNC: 36 MG/DL (ref 7–18)
BUN/CREAT SERPL: 25 (ref 12–20)
CA-I SERPL-SCNC: 1.27 MMOL/L (ref 1.12–1.32)
CALCIUM SERPL-MCNC: 9 MG/DL (ref 8.5–10.1)
CALCIUM SERPL-MCNC: 9.5 MG/DL (ref 8.5–10.1)
CHLORIDE SERPL-SCNC: 107 MMOL/L (ref 100–111)
CO2 SERPL-SCNC: 28 MMOL/L (ref 21–32)
CREAT SERPL-MCNC: 1.43 MG/DL (ref 0.6–1.3)
GLUCOSE SERPL-MCNC: 91 MG/DL (ref 74–99)
MAGNESIUM SERPL-MCNC: 1.8 MG/DL (ref 1.6–2.6)
POTASSIUM SERPL-SCNC: 4.6 MMOL/L (ref 3.5–5.5)
SODIUM SERPL-SCNC: 141 MMOL/L (ref 136–145)

## 2021-05-10 PROCEDURE — 83735 ASSAY OF MAGNESIUM: CPT

## 2021-05-10 PROCEDURE — 80048 BASIC METABOLIC PNL TOTAL CA: CPT

## 2021-05-10 PROCEDURE — 36415 COLL VENOUS BLD VENIPUNCTURE: CPT

## 2021-05-10 PROCEDURE — 82330 ASSAY OF CALCIUM: CPT

## 2021-05-10 PROCEDURE — 82310 ASSAY OF CALCIUM: CPT

## 2021-05-12 ENCOUNTER — TELEPHONE (OUTPATIENT)
Dept: CARDIOLOGY CLINIC | Age: 86
End: 2021-05-12

## 2021-06-01 ENCOUNTER — TELEPHONE (OUTPATIENT)
Dept: CARDIOLOGY CLINIC | Age: 86
End: 2021-06-01

## 2021-06-01 NOTE — TELEPHONE ENCOUNTER
Your PA has been faxed to the plan as a paper copy. Please contact the plan directly if you haven't received a determination in a typical timeframe. You will be notified of the determination via fax. How do I know if the plan approved the PA?     Add Reminder to your Dashboard  Remind me in:   5 business days  Contact plan to follow up on [facility]

## 2021-06-02 NOTE — TELEPHONE ENCOUNTER
Contacted pharmacy. Two patient Identifiers confirmed. Advised pt medicaiton was approved but the copay was 128.00 for 30 days. Will advise pt.

## 2021-06-02 NOTE — TELEPHONE ENCOUNTER
Contacted pts wife at [facility] number. Two patient Identifiers confirmed. Advised pt per pharmacy. Pts wife stated she was not aware that medication was approved. Pt stated she will speak with  and advise on if they would be able to get medication. She stated she will call back and advise on if they need an alternate medication that is more financially feasible. Vira Libman

## 2021-08-16 NOTE — TELEPHONE ENCOUNTER
PCP: Leandro Berg MD    Last appt: 4/27/2021  Future Appointments   Date Time Provider Nurys Coronel   10/21/2021 10:15 AM Bobby Astudillo MD [facility] BS AMB       Requested Prescriptions     Pending Prescriptions Disp Refills    metoprolol succinate (TOPROL-XL) 25 mg XL tablet       Sig: Take 1 Tablet by mouth daily.

## 2021-08-16 NOTE — TELEPHONE ENCOUNTER
Requested Prescriptions     Pending Prescriptions Disp Refills    metoprolol succinate (TOPROL-XL) 25 mg XL tablet       Sig: Take 1 Tablet by mouth daily.

## 2021-08-17 RX ORDER — METOPROLOL SUCCINATE 25 MG/1
25 TABLET, EXTENDED RELEASE ORAL DAILY
Qty: 90 TABLET | Refills: 3 | Status: SHIPPED | OUTPATIENT
Start: 2021-08-17 | End: 2021-11-18

## 2021-11-01 RX ORDER — HYDRALAZINE HYDROCHLORIDE 25 MG/1
75 TABLET, FILM COATED ORAL 3 TIMES DAILY
Qty: 810 TABLET | Refills: 1 | Status: SHIPPED | OUTPATIENT
Start: 2021-11-01 | End: 2021-11-18

## 2021-11-18 ENCOUNTER — OFFICE VISIT (OUTPATIENT)
Dept: CARDIOLOGY CLINIC | Age: 86
End: 2021-11-18
Payer: MEDICARE

## 2021-11-18 VITALS
DIASTOLIC BLOOD PRESSURE: 57 MMHG | RESPIRATION RATE: 18 BRPM | TEMPERATURE: 98.1 F | SYSTOLIC BLOOD PRESSURE: 104 MMHG | OXYGEN SATURATION: 97 % | HEART RATE: 94 BPM | BODY MASS INDEX: 30.74 KG/M2 | WEIGHT: 233 LBS

## 2021-11-18 DIAGNOSIS — I48.91 ATRIAL FIBRILLATION, UNSPECIFIED TYPE (HCC): Primary | ICD-10-CM

## 2021-11-18 PROCEDURE — 1101F PT FALLS ASSESS-DOCD LE1/YR: CPT | Performed by: INTERNAL MEDICINE

## 2021-11-18 PROCEDURE — G8428 CUR MEDS NOT DOCUMENT: HCPCS | Performed by: INTERNAL MEDICINE

## 2021-11-18 PROCEDURE — G8417 CALC BMI ABV UP PARAM F/U: HCPCS | Performed by: INTERNAL MEDICINE

## 2021-11-18 PROCEDURE — 93000 ELECTROCARDIOGRAM COMPLETE: CPT | Performed by: INTERNAL MEDICINE

## 2021-11-18 PROCEDURE — G8510 SCR DEP NEG, NO PLAN REQD: HCPCS | Performed by: INTERNAL MEDICINE

## 2021-11-18 PROCEDURE — G8536 NO DOC ELDER MAL SCRN: HCPCS | Performed by: INTERNAL MEDICINE

## 2021-11-18 PROCEDURE — 99214 OFFICE O/P EST MOD 30 MIN: CPT | Performed by: INTERNAL MEDICINE

## 2021-11-18 RX ORDER — METOPROLOL SUCCINATE 25 MG/1
25 TABLET, EXTENDED RELEASE ORAL 2 TIMES DAILY
Qty: 60 TABLET | Refills: 6 | Status: SHIPPED | OUTPATIENT
Start: 2021-11-18 | End: 2022-05-05

## 2021-11-18 RX ORDER — SACUBITRIL AND VALSARTAN 24; 26 MG/1; MG/1
1 TABLET, FILM COATED ORAL 2 TIMES DAILY
COMMUNITY
End: 2021-12-29 | Stop reason: SDUPTHER

## 2021-11-18 NOTE — PROGRESS NOTES
Cardiovascular Specialists    Mr. Hollie Allen is 80 y.o. 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 Overall blood pressure has remained much better since last visit. Couple of blood pressure medication had to be stopped. Now he is on Entresto  Lately blood heart rate has been elevated  Overall he tells me that his fatigue and dyspnea has improved significantly since last visit. Past Medical History:   Diagnosis Date    A-fib [facility])     S/P Cardioversion X 2 (2018. 2019)    [address] (ab[facility] [address]) [address] [address] [facility] 2013    S/P Endovascular graft repair    Amiodarone pulmonary toxicity 2019    CAD (coronary artery disease)     Cardiomyopathy (HCC)     LVEF 35(11/20) 25% (07/20) 30% (04/20)    COPD (chronic obstructive pulmonary disease) (HCC)     GERD (gastroesophageal reflux disease)     HTN (hypertension)     Sleep apnea     CPAP         Past Surgical History:   Procedure Laterality Date    HX CATARACT REMOVAL Bilateral     HX CERVICAL FUSION      HX KNEE REPLACEMENT Right     NEUROLOGICAL PROCEDURE UNLISTED      DE CARDIAC SURG PROCEDURE UNLIST      ENDOGRAFT AORTA    DE CARDIOVERSION ELECTIVE ARRHYTHMIA EXTERNAL N/A 7/20/2020    EP CARDIOVERSION performed by Shannon Pitt MD at [facility] CATH LAB    VASCULAR SURGERY PROCEDURE UNLIST      STENT RENAL ARTERY       Current Outpatient Medications   Medication Sig    sacubitriL-valsartan (Entresto) 24-26 mg tablet Take 1 Tablet by mouth two (2) times a day.  metoprolol succinate (TOPROL-XL) 25 mg XL tablet Take 1 Tablet by mouth daily.  apixaban (Eliquis) 5 mg tablet Take 5 mg by mouth two (2) times a day. 1/2 tab bid    furosemide (LASIX) 40 mg tablet Take 1 Tab by mouth daily.  clopidogreL (Plavix) 75 mg tab Take 75 mg by mouth.     budesonide (PULMICORT) 0.5 mg/2 mL nbsp 500 mcg by Nebulization route.  budesonide-formoteroL (Symbicort) 160-4.5 mcg/actuation HFAA Take 2 Puffs by inhalation.  magnesium oxide (MAG-OX) 400 mg tablet Take 400 mg by mouth daily.  multivitamin (ONE A DAY) tablet Take 1 Tab by mouth daily.  cholecalciferol, vitamin D3, (VITAMIN D3 PO) Take  by mouth.  melatonin 5 mg tablet Take  by mouth nightly as needed.  formoterol fumarate (PERFOROMIST IN) Take  by inhalation.  revefenacin (Yupelri) 175 mcg/3 mL nebu nebulizer solution 175 mcg by Nebulization route daily.  co-enzyme Q-10 (CoQ-10) 100 mg capsule Take 200 mg by mouth daily.  rOPINIRole (REQUIP) 1 mg tablet TAKE 1 TABLET BY MOUTH EVERY DAY     No current facility-administered medications for this visit. Allergies and Sensitivities:  Allergies   Allergen Reactions    Amiodarone Other (comments)    Statins-Hmg-Coa Reductase Inhibitors Other (comments)     myalgia       Family History:  No family history on file. Social History:  Social History     Tobacco Use    Smoking status: Former Smoker    Smokeless tobacco: Never Used   Substance Use Topics    Alcohol use: Yes     Alcohol/week: 3.0 standard drinks     Types: 3 Glasses of wine per week    Drug use: Never     He  reports that he has quit smoking. He has never used smokeless tobacco.  He  reports current alcohol use of about 3.0 standard drinks of alcohol per week. Review of Systems:  Cardiac symptoms as noted above in HPI. All others negative. Physical Exam:  BP Readings from Last 3 Encounters:   11/18/21 (!) 104/57   04/27/21 (!) 180/68   02/25/21 (!) 125/43         Pulse Readings from Last 3 Encounters:   11/18/21 94   04/27/21 61   02/25/21 (!) 54          Wt Readings from Last 3 Encounters:   11/18/21 105.7 kg (233 lb)   04/27/21 107.5 kg (237 lb)   02/25/21 108.2 kg (238 lb 9.6 oz)       Constitutional: Oriented to person, place, and time.    HENT: Head: Normocephalic and atraumatic. Neck: No JVD present. Carotid bruit is not appreciated. Cardiovascular: Regular rhythm. No murmur, gallop or rubs appreciated  Lung: Breath sounds normal. No respiratory distress. No ronchi or rales appreciated  Abdominal: No tenderness. No rebound and no guarding. Musculoskeletal: There is no + ankle edema. No cynosis    Review of Data  LABS:   Lab Results   Component Value Date/Time    Sodium 141 05/10/2021 10:46 AM    Potassium 4.6 05/10/2021 10:46 AM    Chloride 107 05/10/2021 10:46 AM    CO2 28 05/10/2021 10:46 AM    Glucose 91 05/10/2021 10:46 AM    BUN 36 (H) 05/10/2021 10:46 AM    Creatinine 1.43 (H) 05/10/2021 10:46 AM     No flowsheet data found. Lab Results   Component Value Date/Time    ALT (SGPT) 53 06/08/2020 09:39 AM     No results found for: HBA1C, WJD2CGXH, WVJ1UOTB, XPJ9IULX    EKG  Atrial fibrillation with rapid ventricular response at heart rate of 120 bpm.  Left bundle branch block    ECHO (07/20)  Left Ventricle  Normal cavity size. Mild concentric hypertrophy. Moderate-to-severe systolic dysfunction. The estimated ejection fraction is 20 - 25%. Visually measured ejection fraction. LV wall motion is noted to be hypokinetic. Atrial fibrillation observed. Wall Scoring  The left ventricular wall motion is globally hypokinetic. ECHO (11/20)  Left Ventricle  Normal wall thickness. Mildly dilated left ventricle. The calculated EF is 35%. Visually measured ejection fraction. Moderate-to-severely reduced systolic function. Unable to assess diastolic function. Wall Scoring  The left ventricular wall motion is globally hypokinetic. Right Ventricle  Normal cavity size. Borderline low systolic function. Aortic Valve  Trileaflet valve structure. There is leaflet calcification. There is mild aortic stenosis. Trace aortic valve regurgitation. Mitral Valve  No stenosis. Mitral valve non-specific thickening.  Mild mitral annular calcification. Trace regurgitation. Tricuspid Valve  Normal valve structure and no stenosis. Mild regurgitation. Pulmonic Valve  Normal valve structure and no stenosis. Trace regurgitation. Pulmonary Artery  Pulmonary arterial systolic pressure (PASP) is 40 mmHg. Pulmonary hypertension found to be mild. IMPRESSION & PLAN:  Mr. Hollie Allen is 80 y.o. with multiple medical problem    Cardiomyopathy:  LVEF 30% by echo in April 2020. Most likely from A. fib and tachycardia. LVEF 25% in July 2020. Underwent cardioversion for atrial fibrillation successfully in July 2020  LVEF 35% in November 2020  Currently on Toprol. Continue same. Heart rate 54 bpm    Atrial fibrillation:  Paroxysmal in nature according to patient. Initially diagnosed in 2018. Had cardioversion successfully in 2018 and again in 2019. Status post successful cardioversion again in July 2020  On exam, he appears to be back in atrial fibrillation. We will perform EKG today  Because of elevated heart rate, I am going to increase dose of Toprol-XL to 25 mg twice daily  Patient is currently taking Plavix and also on Eliquis. I discussed with Dr. Andrey Cheek from vascular surgery team in past and he would like for patient to continue Plavix despite being on Eliquis. Has seen electrophysiology colleague in July 2020. Suboptimal candidate for pulmonary vein isolation or antiarrhythmic medication. Rate control strategy has been recommended. Had a lengthy discussion with the patient regarding management plan. HLD:  Intolerance to statin with leg cramps and itching and skin rash. Tried 4 different statins in past.  Discussed rapatha and does not want it after discussing risk, benefit and alternatives. Hypertension:  Currently on toprol and entresto. Off of ARB, hydralazine and amlodipine    COPD, emphysema:  Defer to Dr. Cata De La O. This plan was discussed with patient who is in agreement.     Thank you for allowing me to participate in patient care. Please feel free to call me if you have any question or concern. Darryn Medina MD  Please note: This document has been produced using voice recognition software. Unrecognized errors in transcription may be present.

## 2021-11-18 NOTE — LETTER
11/18/2021    Patient: Carmen Wylie. YOB: 1931   Date of Visit: 11/18/2021     Odessa Baumgarten, MD  [address] [place] [address].  [address] [address]  Via Fax: [phone]    Dear Odessa Baumgarten, MD,      Thank you for referring Mr. Gaston Tracey to [address] [place] [address] for evaluation. My notes for this consultation are attached. If you have questions, please do not hesitate to call me. I look forward to following your patient along with you.       Sincerely,    Kiah Richardson MD

## 2021-11-18 NOTE — PROGRESS NOTES
Identified pt with two pt identifiers(name and ). Reviewed record in preparation for visit and have obtained necessary documentation. Javed Noland. presents today for   Chief Complaint   Patient presents with    Follow-up     6m       Johnny E Jannice Nageotte. preferred language for health care discussion is english/other. Personal Protective Equipment:   Personal Protective Equipment was used including: mask-surgical and hands-gloves. Patient was placed on no precaution(s). Patient was masked. Precautions:   Patient currently on None  Patient currently roomed with door closed    Is someone accompanying this pt? Yes wife    Is the patient using any DME equipment during [address]? cane    Depression Screening:  3 most recent PHQ Screens 2021   Little interest or pleasure in doing things Not at all   Feeling down, depressed, irritable, or hopeless Not at all   Total Score PHQ 2 0       Learning Assessment:  Learning Assessment 2020   PRIMARY LEARNER Patient   PRIMARY LANGUAGE ENGLISH   LEARNER PREFERENCE PRIMARY DEMONSTRATION   ANSWERED BY patient   RELATIONSHIP SELF       Abuse Screening:  Abuse Screening Questionnaire 2020   Do you ever feel afraid of your partner? N   Are you in a relationship with someone who physically or mentally threatens you? N   Is it safe for you to go home? Y       Fall Risk  Fall Risk Assessment, last 12 mths 2021   Able to walk? Yes   Fall in past 12 months? 0   Do you feel unsteady? 0   Are you worried about falling 0   Number of falls in past 12 months -   Fall with injury? -       Pt currently taking Anticoagulant therapy?  no  Coordination of Care:  1. Have you been to the ER, urgent care clinic since your last visit? Hospitalized since your last visit? No     2. Have you seen or consulted any other health care providers outside of the [address] [place] [address] since your last visit? Include any pap smears or colon screening.   yes      Please see Red banners under Allergies and Med Rec to remove outside inquires. All correct information has been verified with patient and added to chart.      Medication's patient's would liked removed has been marked not taking to be removed per Verbal order and read back per Hieu Carlson MD

## 2021-12-02 NOTE — TELEPHONE ENCOUNTER
PCP: Meredith Meraz MD    Last appt: 11/18/2021  Future Appointments   Date Time Provider Nurys Coronel   12/28/2021  2:00 PM Bryn Byrd MD [facility] BS AMB       Requested Prescriptions     Pending Prescriptions Disp Refills    Eliquis 5 mg tablet [Pharmacy Med Name: ELIQUIS 5 MG TABLET] 180 Tablet 3     Sig: TAKE 1 TABLET BY MOUTH TWICE A DAY         Other Comments:  Verbal order and read back per Ángela Akers MD   Will refill eliquis; plavix deferred to Dr Oliva Garnica

## 2021-12-06 RX ORDER — APIXABAN 5 MG/1
TABLET, FILM COATED ORAL
Qty: 180 TABLET | Refills: 3 | Status: SHIPPED | OUTPATIENT
Start: 2021-12-06 | End: 2022-08-30 | Stop reason: DRUGHIGH

## 2021-12-28 ENCOUNTER — OFFICE VISIT (OUTPATIENT)
Dept: CARDIOLOGY CLINIC | Age: 86
End: 2021-12-28
Payer: MEDICARE

## 2021-12-28 VITALS
RESPIRATION RATE: 14 BRPM | BODY MASS INDEX: 31.27 KG/M2 | SYSTOLIC BLOOD PRESSURE: 130 MMHG | TEMPERATURE: 98.4 F | WEIGHT: 237 LBS | DIASTOLIC BLOOD PRESSURE: 83 MMHG | OXYGEN SATURATION: 97 % | HEART RATE: 102 BPM

## 2021-12-28 DIAGNOSIS — I50.20 SYSTOLIC CONGESTIVE HEART FAILURE, UNSPECIFIED HF CHRONICITY (HCC): Primary | ICD-10-CM

## 2021-12-28 PROCEDURE — 99214 OFFICE O/P EST MOD 30 MIN: CPT | Performed by: INTERNAL MEDICINE

## 2021-12-28 PROCEDURE — G8417 CALC BMI ABV UP PARAM F/U: HCPCS | Performed by: INTERNAL MEDICINE

## 2021-12-28 PROCEDURE — 1101F PT FALLS ASSESS-DOCD LE1/YR: CPT | Performed by: INTERNAL MEDICINE

## 2021-12-28 PROCEDURE — G8510 SCR DEP NEG, NO PLAN REQD: HCPCS | Performed by: INTERNAL MEDICINE

## 2021-12-28 PROCEDURE — G8428 CUR MEDS NOT DOCUMENT: HCPCS | Performed by: INTERNAL MEDICINE

## 2021-12-28 PROCEDURE — G8536 NO DOC ELDER MAL SCRN: HCPCS | Performed by: INTERNAL MEDICINE

## 2021-12-28 RX ORDER — CLOPIDOGREL BISULFATE 75 MG/1
TABLET ORAL
COMMUNITY

## 2021-12-28 NOTE — LETTER
12/28/2021    Patient: Tamika Willis. YOB: 1931   Date of Visit: 12/28/2021     Shama Silva MD  [address] [place] [id number]  Via Fax: [phone]    Dear Shama Silva MD,      Thank you for referring Mr. Perry Fajardo to [address] [place] [address] for evaluation. My notes for this consultation are attached. If you have questions, please do not hesitate to call me. I look forward to following your patient along with you.       Sincerely,    Otilia De Anda MD

## 2021-12-28 NOTE — PROGRESS NOTES
[facility]    Mr. Isidoro Edward is 80 y.o. 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 Overall blood pressure has remained much better since last visit. At home heart rate that is between 60 to 120 bpm.  Occasional palpitation. No presyncope or syncope. Continues to have dyspnea    Past Medical History:   Diagnosis Date    A-fib [facility])     S/P Cardioversion X 2 (2018. 2019)    AAA (abdominal aortic aneurysm) ([facility] [address] 2013    S/P Endovascular graft repair    Amiodarone pulmonary toxicity 2019    CAD (coronary artery disease)     Cardiomyopathy (HCC)     LVEF 35(11/20) 25% (07/20) 30% (04/20)    COPD (chronic obstructive pulmonary disease) (HCC)     GERD (gastroesophageal reflux disease)     HTN (hypertension)     Sleep apnea     CPAP         Past Surgical History:   Procedure Laterality Date    HX CATARACT REMOVAL Bilateral     HX CERVICAL FUSION      HX KNEE REPLACEMENT Right     NEUROLOGICAL PROCEDURE UNLISTED      NV CARDIAC SURG PROCEDURE UNLIST      ENDOGRAFT AORTA    NV CARDIOVERSION ELECTIVE ARRHYTHMIA EXTERNAL N/A 7/20/2020    EP CARDIOVERSION performed by Harjinder Viera MD at [facility] CATH LAB    VASCULAR SURGERY PROCEDURE UNLIST      STENT RENAL ARTERY       Current Outpatient Medications   Medication Sig    clopidogreL (Plavix) 75 mg tab Take  by mouth.  Eliquis 5 mg tablet TAKE 1 TABLET BY MOUTH TWICE A DAY    sacubitriL-valsartan (Entresto) 24-26 mg tablet Take 1 Tablet by mouth two (2) times a day.  metoprolol succinate (TOPROL-XL) 25 mg XL tablet Take 1 Tablet by mouth two (2) times a day.  furosemide (LASIX) 40 mg tablet Take 1 Tab by mouth daily.  budesonide (PULMICORT) 0.5 mg/2 mL nbsp 500 mcg by Nebulization route.     budesonide-formoteroL (Symbicort) 160-4.5 mcg/actuation HFAA Take 2 Puffs by inhalation.  magnesium oxide (MAG-OX) 400 mg tablet Take 400 mg by mouth daily.  multivitamin (ONE A DAY) tablet Take 1 Tab by mouth daily.  cholecalciferol, vitamin D3, (VITAMIN D3 PO) Take  by mouth.  melatonin 5 mg tablet Take  by mouth nightly as needed.  formoterol fumarate (PERFOROMIST IN) Take  by inhalation.  revefenacin (Yupelri) 175 mcg/3 mL nebu nebulizer solution 175 mcg by Nebulization route daily.  co-enzyme Q-10 (CoQ-10) 100 mg capsule Take 200 mg by mouth daily.  rOPINIRole (REQUIP) 1 mg tablet TAKE 1 TABLET BY MOUTH EVERY DAY     No current facility-administered medications for this visit. Allergies and Sensitivities:  Allergies   Allergen Reactions    Amiodarone Other (comments)    Statins-Hmg-Coa Reductase Inhibitors Other (comments)     myalgia       Family History:  No family history on file. Social History:  Social History     Tobacco Use    Smoking status: Former Smoker    Smokeless tobacco: Never Used   Substance Use Topics    Alcohol use: Yes     Alcohol/week: 3.0 standard drinks     Types: 3 Glasses of wine per week    Drug use: Never     He  reports that he has quit smoking. He has never used smokeless tobacco.  He  reports current alcohol use of about 3.0 standard drinks of alcohol per week. Review of Systems:  Cardiac symptoms as noted above in HPI. All others negative. Physical Exam:  BP Readings from Last 3 Encounters:   12/28/21 130/83   11/18/21 (!) 104/57   04/27/21 (!) 180/68         Pulse Readings from Last 3 Encounters:   12/28/21 (!) 102   11/18/21 94   04/27/21 61          Wt Readings from Last 3 Encounters:   12/28/21 107.5 kg (237 lb)   11/18/21 105.7 kg (233 lb)   04/27/21 107.5 kg (237 lb)       Constitutional: Oriented to person, place, and time. HENT: Head: Normocephalic and atraumatic. Neck: No JVD present. Carotid bruit is not appreciated. Cardiovascular: Irregular rhythm.    No murmur, gallop or rubs appreciated  Lung: Breath sounds normal. No respiratory distress. No ronchi or rales appreciated  Abdominal: No tenderness. No rebound and no guarding. Musculoskeletal: There is no + ankle edema. No cynosis    Review of Data  LABS:   Lab Results   Component Value Date/Time    Sodium 141 05/10/2021 10:46 AM    Potassium 4.6 05/10/2021 10:46 AM    Chloride 107 05/10/2021 10:46 AM    CO2 28 05/10/2021 10:46 AM    Glucose 91 05/10/2021 10:46 AM    BUN 36 (H) 05/10/2021 10:46 AM    Creatinine 1.43 (H) 05/10/2021 10:46 AM     No flowsheet data found. Lab Results   Component Value Date/Time    ALT (SGPT) 53 06/08/2020 09:39 AM     No results found for: HBA1C, UVE4EOZP, XBB2QVYG, TWR6UCCK    EKG  Atrial fibrillation with rapid ventricular response at heart rate of 120 bpm.  Left bundle branch block    ECHO (07/20)  Left Ventricle  Normal cavity size. Mild concentric hypertrophy. Moderate-to-severe systolic dysfunction. The estimated ejection fraction is 20 - 25%. Visually measured ejection fraction. LV wall motion is noted to be hypokinetic. Atrial fibrillation observed. Wall Scoring  The left ventricular wall motion is globally hypokinetic. ECHO (11/20)  Left Ventricle  Normal wall thickness. Mildly dilated left ventricle. The calculated EF is 35%. Visually measured ejection fraction. Moderate-to-severely reduced systolic function. Unable to assess diastolic function. Wall Scoring  The left ventricular wall motion is globally hypokinetic. Right Ventricle  Normal cavity size. Borderline low systolic function. Aortic Valve  Trileaflet valve structure. There is leaflet calcification. There is mild aortic stenosis. Trace aortic valve regurgitation. Mitral Valve  No stenosis. Mitral valve non-specific thickening. Mild mitral annular calcification. Trace regurgitation. Tricuspid Valve  Normal valve structure and no stenosis. Mild regurgitation.     Pulmonic Valve  Normal valve structure and no stenosis. Trace regurgitation. Pulmonary Artery  Pulmonary arterial systolic pressure (PASP) is 40 mmHg. Pulmonary hypertension found to be mild. IMPRESSION & PLAN:  Mr. Yahir Burns is 80 y.o. with multiple medical problem    Cardiomyopathy:  LVEF 30% by echo in April 2020. Most likely from A. fib and tachycardia. LVEF 25% in July 2020. Underwent cardioversion for atrial fibrillation successfully in July 2020  LVEF 35% in November 2020  Currently on [place]. Using Lasix. .  Will increase Toprol to 37.5 mg twice daily for better heart rate control  We will repeat echocardiogram to see if EF has improved, if not, he will need a coronary evaluation before considering AICD    Atrial fibrillation:  Paroxysmal in nature according to patient. Initially diagnosed in 2018. Had cardioversion successfully in 2018 and again in 2019. Status post successful cardioversion again in July 2020  Back in a.fib in 11/2021. Continue BB. Will increase Toprol to 37.5 mg twice daily for better heart rate control  I discussed with Dr. Viki Bynum from vascular surgery team in past and he would like for patient to continue Plavix despite being on Eliquis. Has seen electrophysiology colleague in July 2020. Suboptimal candidate for pulmonary vein isolation or antiarrhythmic medication. Rate control strategy has been recommended. Had a lengthy discussion with the patient regarding management plan. Increasing beta-blocker today. If difficult to control heart rate, and if persistent LV dysfunction on repeat echocardiogram, plan is to proceed with coronary evaluation and if no coronary artery disease, he will need to consider pacemaker/AICD and ?? AV node ablation for rate control    HLD:  Intolerance to statin with leg cramps and itching and skin rash.  Tried 4 different statins in past.  Discussed rapatha and does not want it after discussing risk, benefit and alternatives. Hypertension:  Currently on toprol and entresto. Off of ARB, hydralazine and amlodipine  Repeat echocardiogram ordered    COPD, emphysema:  Defer to Dr. Taylor Andrew. This plan was discussed with patient who is in agreement. Thank you for allowing me to participate in patient care. Please feel free to call me if you have any question or concern. Juliana Nielson MD  Please note: This document has been produced using voice recognition software. Unrecognized errors in transcription may be present.

## 2021-12-28 NOTE — PROGRESS NOTES
Identified pt with two pt identifiers(name and ). Reviewed record in preparation for visit and have obtained necessary documentation. Heather Guillory presents today for   Chief Complaint   Patient presents with   [id number]. preferred language for health care discussion is english/other. Personal Protective Equipment:   Personal Protective Equipment was used including: mask-surgical and hands-gloves. Patient was placed on no precaution(s). Patient was masked. Precautions:   Patient currently on None  Patient currently roomed with door closed    Is someone accompanying this pt? Yes wife    Is the patient using any DME equipment during [address]? Ys cane    Depression Screening:  3 most recent PHQ Screens 2021   Little interest or pleasure in doing things Not at all   Feeling down, depressed, irritable, or hopeless Not at all   Total Score PHQ 2 0       Learning Assessment:  Learning Assessment 2020   PRIMARY LEARNER Patient   PRIMARY LANGUAGE ENGLISH   LEARNER PREFERENCE PRIMARY DEMONSTRATION   ANSWERED BY patient   RELATIONSHIP SELF       Abuse Screening:  Abuse Screening Questionnaire 2020   Do you ever feel afraid of your partner? N   Are you in a relationship with someone who physically or mentally threatens you? N   Is it safe for you to go home? Y       Fall Risk  Fall Risk Assessment, last 12 mths 2021   Able to walk? Yes   Fall in past 12 months? 0   Do you feel unsteady? 0   Are you worried about falling 0   Number of falls in past 12 months -   Fall with injury? -       Pt currently taking Anticoagulant therapy?  no    Coordination of Care:  1. Have you been to the ER, urgent care clinic since your last visit? Hospitalized since your last visit? no    2. Have you seen or consulted any other health care providers outside of the [address] [place] [address] since your last visit? Include any pap smears or colon screening.   yes      Please see Red banners under Allergies and Med Rec to remove outside inquires. All correct information has been verified with patient and added to chart.      Medication's patient's would liked removed has been marked not taking to be removed per Verbal order and read back per Claire Rankin MD

## 2021-12-28 NOTE — PATIENT INSTRUCTIONS
Testing   Echo  **call office 3-5 days after testing is completed for results**     New Medication/Medication Changes  Metoprolol 37.5 mg tab twice a day    **please allow 24-48 hrs for medication to be escribed to pharmacy** If you need any refills on medications please contact your pharmacy so that the request can be escribed to the provider for review.

## 2021-12-29 NOTE — TELEPHONE ENCOUNTER
Requested Prescriptions     Pending Prescriptions Disp Refills    sacubitriL-valsartan (Entresto) 24-26 mg tablet       Sig: Take 1 Tablet by mouth two (2) times a day.      Patient needs 90 day instead of 60

## 2021-12-29 NOTE — TELEPHONE ENCOUNTER
PCP: Sharmaine Mejia MD    Last appt: 12/28/2021  Future Appointments   Date Time Provider Nurys Coronel   2/1/2022  1:00 PM [facility] [address] ECHO 1 [facility] BS AMB   3/15/2022  2:00 PM Osiel Jordan MD [facility] BS AMB       Requested Prescriptions     Pending Prescriptions Disp Refills    sacubitriL-valsartan (Entresto) 24-26 mg tablet 180 Tablet 3     Sig: Take 1 Tablet by mouth two (2) times a day.

## 2021-12-30 RX ORDER — SACUBITRIL AND VALSARTAN 24; 26 MG/1; MG/1
1 TABLET, FILM COATED ORAL 2 TIMES DAILY
Qty: 180 TABLET | Refills: 3 | Status: SHIPPED | OUTPATIENT
Start: 2021-12-30

## 2022-01-31 ENCOUNTER — TELEPHONE (OUTPATIENT)
Dept: CARDIOLOGY CLINIC | Age: 87
End: 2022-01-31

## 2022-02-01 RX ORDER — CLONIDINE HYDROCHLORIDE 0.1 MG/1
0.1 TABLET ORAL DAILY
Qty: 90 TABLET | Refills: 3 | Status: SHIPPED | OUTPATIENT
Start: 2022-02-01 | End: 2022-08-30

## 2022-02-01 RX ORDER — OLMESARTAN MEDOXOMIL 40 MG/1
TABLET ORAL
Qty: 90 TABLET | Refills: 3 | OUTPATIENT
Start: 2022-02-01

## 2022-02-01 RX ORDER — FUROSEMIDE 40 MG/1
TABLET ORAL
Qty: 90 TABLET | Refills: 3 | Status: SHIPPED | OUTPATIENT
Start: 2022-02-01 | End: 2022-04-11

## 2022-02-01 NOTE — TELEPHONE ENCOUNTER
PCP: Chino Denton MD    Last appt: 12/28/2021  Future Appointments   Date Time Provider Nurys Coronel   3/15/2022  2:00 PM Rory Nieves MD [facility] BS AMB       Requested Prescriptions     Pending Prescriptions Disp Refills    furosemide (LASIX) 40 mg tablet [Pharmacy Med Name: FUROSEMIDE 40 MG TABLET] 90 Tablet 3     Sig: TAKE 1 TABLET BY MOUTH EVERY DAY    cloNIDine HCL (CATAPRES) 0.1 mg tablet [Pharmacy Med Name: CLONIDINE HCL 0.1 MG TABLET] 90 Tablet 3     Sig: TAKE 1 TAB BY MOUTH DAILY. TAKE WHEN SYSTOLIC IS GREATER THAN 146 ONLY.      Refused Prescriptions Disp Refills    olmesartan (BENICAR) 40 mg tablet [Pharmacy Med Name: OLMESARTAN MEDOXOMIL 40 MG TAB] 90 Tablet 3     Sig: TAKE 1 TABLET BY MOUTH EVERY DAY       other; pt on entresto

## 2022-02-02 ENCOUNTER — TELEPHONE (OUTPATIENT)
Dept: CARDIOLOGY CLINIC | Age: 87
End: 2022-02-02

## 2022-02-04 ENCOUNTER — TELEPHONE (OUTPATIENT)
Dept: CARDIOLOGY CLINIC | Age: 87
End: 2022-02-04

## 2022-02-04 NOTE — TELEPHONE ENCOUNTER
----- Message from Amos Romero MD sent at 2/1/2022  3:39 PM EST -----  Please call the patient regarding his abnormal result.

## 2022-02-07 NOTE — TELEPHONE ENCOUNTER
Contacted pt at [facility] number. Two patient Identifiers confirmed. Advised pt per Dr Kris Chavez. Pt verbalized understanding. Pts wife stated she was advised that pt was to take aldactone along with lasix but she could not remember if it was Dr Kris Chavez or Dr Aletha Benjamin. Advised I would review with Dr Kris Chavez and advise.

## 2022-02-08 NOTE — TELEPHONE ENCOUNTER
Attempted to contact pt at  number, no answer. Lvm for pt to return call to office at [phone]. Will continue to try to contact pt.

## 2022-02-09 NOTE — TELEPHONE ENCOUNTER
Contacted pts wife at [facility] number. Two patient Identifiers confirmed. Advised pts wife per Dr Radha Israel. Pts wife verbalized understanding.

## 2022-03-15 ENCOUNTER — OFFICE VISIT (OUTPATIENT)
Dept: CARDIOLOGY CLINIC | Age: 87
End: 2022-03-15
Payer: MEDICARE

## 2022-03-15 VITALS
SYSTOLIC BLOOD PRESSURE: 114 MMHG | BODY MASS INDEX: 30.61 KG/M2 | TEMPERATURE: 98.1 F | OXYGEN SATURATION: 96 % | DIASTOLIC BLOOD PRESSURE: 70 MMHG | WEIGHT: 232 LBS | HEART RATE: 104 BPM

## 2022-03-15 DIAGNOSIS — E78.00 PURE HYPERCHOLESTEROLEMIA: ICD-10-CM

## 2022-03-15 DIAGNOSIS — I10 ESSENTIAL HYPERTENSION WITH GOAL BLOOD PRESSURE LESS THAN 140/90: Primary | ICD-10-CM

## 2022-03-15 DIAGNOSIS — I48.0 PAF (PAROXYSMAL ATRIAL FIBRILLATION) (HCC): ICD-10-CM

## 2022-03-15 DIAGNOSIS — Z01.818 PRE-OP TESTING: ICD-10-CM

## 2022-03-15 DIAGNOSIS — I42.8 OTHER CARDIOMYOPATHY (HCC): ICD-10-CM

## 2022-03-15 PROCEDURE — G8536 NO DOC ELDER MAL SCRN: HCPCS | Performed by: INTERNAL MEDICINE

## 2022-03-15 PROCEDURE — G8428 CUR MEDS NOT DOCUMENT: HCPCS | Performed by: INTERNAL MEDICINE

## 2022-03-15 PROCEDURE — G8510 SCR DEP NEG, NO PLAN REQD: HCPCS | Performed by: INTERNAL MEDICINE

## 2022-03-15 PROCEDURE — G8417 CALC BMI ABV UP PARAM F/U: HCPCS | Performed by: INTERNAL MEDICINE

## 2022-03-15 PROCEDURE — 99215 OFFICE O/P EST HI 40 MIN: CPT | Performed by: INTERNAL MEDICINE

## 2022-03-15 PROCEDURE — 1101F PT FALLS ASSESS-DOCD LE1/YR: CPT | Performed by: INTERNAL MEDICINE

## 2022-03-15 RX ORDER — SODIUM CHLORIDE 0.9 % (FLUSH) 0.9 %
5-40 SYRINGE (ML) INJECTION EVERY 8 HOURS
Status: CANCELLED | OUTPATIENT
Start: 2022-03-15

## 2022-03-15 RX ORDER — SODIUM CHLORIDE 9 MG/ML
1000 INJECTION, SOLUTION INTRAVENOUS CONTINUOUS
Status: CANCELLED | OUTPATIENT
Start: 2022-03-15

## 2022-03-15 RX ORDER — SODIUM CHLORIDE 0.9 % (FLUSH) 0.9 %
5-40 SYRINGE (ML) INJECTION AS NEEDED
Status: CANCELLED | OUTPATIENT
Start: 2022-03-15

## 2022-03-15 RX ORDER — ASPIRIN 325 MG
325 TABLET ORAL DAILY
Status: CANCELLED | OUTPATIENT
Start: 2022-03-16

## 2022-03-15 NOTE — TELEPHONE ENCOUNTER
PCP: Danika Lion MD    Last appt: 3/15/2022  No future appointments. Requested Prescriptions     Pending Prescriptions Disp Refills    digoxin (LANOXIN) 0.125 mg tablet 90 Tablet 3     Sig: Take 1 Tablet by mouth daily.

## 2022-03-15 NOTE — PROGRESS NOTES
Identified pt with two pt identifiers(name and ). Reviewed record in preparation for visit and have obtained necessary documentation. Ammon Isabel. presents today for   Chief Complaint   Patient presents with    Irregular Heart Beat       Pt c/o SOB           Joel Langston. preferred language for health care discussion is english/other. Personal Protective Equipment:   Personal Protective Equipment was used including: mask-surgical and hands-gloves. Patient was placed on no precaution(s). Patient was masked. Precautions:   Patient currently on None  Patient currently roomed with door closed. Is someone accompanying this pt? Yes (wife)    Is the patient using any DME equipment during OV? cane    Depression Screening:  3 most recent PHQ Screens 2021   Little interest or pleasure in doing things Not at all   Feeling down, depressed, irritable, or hopeless Not at all   Total Score PHQ 2 0       Learning Assessment:  Learning Assessment 2020   PRIMARY LEARNER Patient   PRIMARY LANGUAGE ENGLISH   LEARNER PREFERENCE PRIMARY DEMONSTRATION   ANSWERED BY patient   RELATIONSHIP SELF       Abuse Screening:  Abuse Screening Questionnaire 2020   Do you ever feel afraid of your partner? N   Are you in a relationship with someone who physically or mentally threatens you? N   Is it safe for you to go home? Y       Fall Risk  Fall Risk Assessment, last 12 mths 2021   Able to walk? Yes   Fall in past 12 months? -   Do you feel unsteady? -   Are you worried about falling -   Number of falls in past 12 months -   Fall with injury? -       Pt currently taking Anticoagulant therapy? yes  Pt currently taking Antiplatelet therapy? yes    Coordination of Care:  1. Have you been to the ER, urgent care clinic since your last visit? Hospitalized since your last visit? No    2. Have you seen or consulted any other health care providers outside of the [address] [place] [address] [place] since your last visit? Include any pap smears or colon screening. No      Please see Red banners under Allergies and Med Rec to remove outside inquires. All correct information has been verified with patient and added to chart.      Medication's patient's would liked removed has been marked not taking to be removed per Verbal order and read back per Tanisha Bruce MD

## 2022-03-15 NOTE — PATIENT INSTRUCTIONS
New Mediciaton   Digoxin 0.125 mg daily       Instructions    Pre procedure labs(CBC, CMP, PT/INR) to be completed within 3-5 days prior to procedure. Labs drawn in [address] [place] [address]. Their hours of operation are Monday through Friday 7am-3:30 pm.  If you have and questions regarding directions please contact them at [phone]. Patients Name:  Mi Reyes are scheduled to have a left heart catherization on March 23, 2022   at 10:30 am.  Please check in at 9 am.   **YOU WILL NEED SOMEONE TO DRIVE YOU HOME AFTER PROCEDURE. 2. Please go to DR. [facility]S \[facility] [place] [facility]\"" and park in the outpatient parking lot that is located around to the back of the hospital and enter through the [facility] building. Once you enter through the [facility] check in with the  there. The  will either give you directions or assist you in getting to the cath holding area. 3. You are not to eat or drink anything after midnight the night before your procedure. Small sips of water to take your medications is ok. 4. If you are diabetic, do not take your insulin/sugar pill the morning of the procedure. 5. MEDICATION INSTRUCTIONS:   Please take your morning medications with the following special instructions:      [x]          Please make sure to take your Blood pressure medication : eliquis, metoprolol, digoxin and entresto. Hold lasix day of procedure. [x]          Take your Aspirin and/or Plavix/Brilinta. 6. We encourage families to wait in the waiting room on the first floor while the procedure is being done. The Doctor will come out and talk with you as soon as the procedure is over. 7. There is the possibility that you may spend the night in the hospital, depending on the results of the procedure. This will be determined after the procedure is done. If angioplasty or stent is planned, you will stay at least one day.      8. If you or your family have any questions, please call our office Monday -Friday, 9:00 a.m.-4:30 p.m.,  At [phone], and ask to speak to one of the nurses.

## 2022-03-15 NOTE — PROGRESS NOTES
[facility]    Mr. Nkechi Durán is 80 y.o. 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 Overall blood pressure has remained stable. Continues to feel fatigue and tired. He has some dyspnea with exertion. No chest pain or chest tightness. At times his heart rate has been as high has 130 bpm with minimal activity. Occasional palpitation present  No presyncope or syncope. Continues to have dyspnea    Past Medical History:   Diagnosis Date    A-fib [facility])     S/P Cardioversion X 2 (2018. 2019)    AAA (abdominal aortic aneurysm) ([facility] 2013    S/P Endovascular graft repair    Amiodarone pulmonary toxicity 2019    CAD (coronary artery disease)     Cardiomyopathy (HCC)     LVEF 35(11/20) 25% (07/20) 30% (04/20)    COPD (chronic obstructive pulmonary disease) (HCC)     GERD (gastroesophageal reflux disease)     HTN (hypertension)     Sleep apnea     CPAP         Past Surgical History:   Procedure Laterality Date    HX CATARACT REMOVAL Bilateral     HX CERVICAL FUSION      HX KNEE REPLACEMENT Right     NEUROLOGICAL PROCEDURE UNLISTED      CO CARDIAC SURG PROCEDURE UNLIST      ENDOGRAFT AORTA    CO CARDIOVERSION ELECTIVE ARRHYTHMIA EXTERNAL N/A 7/20/2020    EP CARDIOVERSION performed by Shailesh Royal MD at [facility] CATH LAB    VASCULAR SURGERY PROCEDURE UNLIST      STENT RENAL ARTERY       Current Outpatient Medications   Medication Sig    furosemide (LASIX) 40 mg tablet TAKE 1 TABLET BY MOUTH EVERY DAY    cloNIDine HCL (CATAPRES) 0.1 mg tablet TAKE 1 TAB BY MOUTH DAILY. TAKE WHEN SYSTOLIC IS GREATER THAN 244 ONLY.  sacubitriL-valsartan (Entresto) 24-26 mg tablet Take 1 Tablet by mouth two (2) times a day.  clopidogreL (Plavix) 75 mg tab Take  by mouth.     Eliquis 5 mg tablet TAKE 1 TABLET BY MOUTH TWICE A DAY    metoprolol succinate (TOPROL-XL) 25 mg XL tablet Take 1 Tablet by mouth two (2) times a day. (Patient taking differently: Take 37.5 mg by mouth two (2) times a day.)    budesonide (PULMICORT) 0.5 mg/2 mL nbsp 500 mcg by Nebulization route.  budesonide-formoteroL (Symbicort) 160-4.5 mcg/actuation HFAA Take 2 Puffs by inhalation.  magnesium oxide (MAG-OX) 400 mg tablet Take 400 mg by mouth daily.  multivitamin (ONE A DAY) tablet Take 1 Tab by mouth daily.  cholecalciferol, vitamin D3, (VITAMIN D3 PO) Take  by mouth.  melatonin 5 mg tablet Take  by mouth nightly as needed.  formoterol fumarate (PERFOROMIST IN) Take  by inhalation.  revefenacin (Yupelri) 175 mcg/3 mL nebu nebulizer solution 175 mcg by Nebulization route daily.  co-enzyme Q-10 (CoQ-10) 100 mg capsule Take 200 mg by mouth daily.  rOPINIRole (REQUIP) 1 mg tablet TAKE 1 TABLET BY MOUTH EVERY DAY     No current facility-administered medications for this visit. Allergies and Sensitivities:  Allergies   Allergen Reactions    Amiodarone Other (comments)    Statins-Hmg-Coa Reductase Inhibitors Other (comments)     myalgia       Family History:  No family history on file. Social History:  Social History     Tobacco Use    Smoking status: Former Smoker    Smokeless tobacco: Never Used   Substance Use Topics    Alcohol use: Yes     Alcohol/week: 3.0 standard drinks     Types: 3 Glasses of wine per week    Drug use: Never     He  reports that he has quit smoking. He has never used smokeless tobacco.  He  reports current alcohol use of about 3.0 standard drinks of alcohol per week. Review of Systems:  Cardiac symptoms as noted above in HPI. All others negative.     Physical Exam:  BP Readings from Last 3 Encounters:   02/01/22 113/76   12/28/21 130/83   11/18/21 (!) 104/57         Pulse Readings from Last 3 Encounters:   12/28/21 (!) 102   11/18/21 94   04/27/21 61          Wt Readings from Last 3 Encounters:   02/01/22 107.5 kg (237 lb)   12/28/21 107.5 kg (237 lb)   11/18/21 105.7 kg (233 lb)       Constitutional: Oriented to person, place, and time. HENT: Head: Normocephalic and atraumatic. Neck: No JVD present. Carotid bruit is not appreciated. Cardiovascular: Irregular rhythm. No murmur, gallop or rubs appreciated  Lung: Breath sounds normal. No respiratory distress. No ronchi or rales appreciated  Abdominal: No tenderness. No rebound and no guarding. Musculoskeletal: There is trace + ankle edema. No cynosis    Review of Data  LABS:   Lab Results   Component Value Date/Time    Sodium 141 05/10/2021 10:46 AM    Potassium 4.6 05/10/2021 10:46 AM    Chloride 107 05/10/2021 10:46 AM    CO2 28 05/10/2021 10:46 AM    Glucose 91 05/10/2021 10:46 AM    BUN 36 (H) 05/10/2021 10:46 AM    Creatinine 1.43 (H) 05/10/2021 10:46 AM     No flowsheet data found. Lab Results   Component Value Date/Time    ALT (SGPT) 53 06/08/2020 09:39 AM     No results found for: HBA1C, BGA6CAEQ, IUB9LUQJ, BDD2YMMI    EKG  Atrial fibrillation with rapid ventricular response at heart rate of 120 bpm.  Left bundle branch block    ECHO (07/20)  Left Ventricle  Normal cavity size. Mild concentric hypertrophy. Moderate-to-severe systolic dysfunction. The estimated ejection fraction is 20 - 25%. Visually measured ejection fraction. LV wall motion is noted to be hypokinetic. Atrial fibrillation observed. Wall Scoring  The left ventricular wall motion is globally hypokinetic. ECHO (11/20)  Left Ventricle  Normal wall thickness. Mildly dilated left ventricle. The calculated EF is 35%. Visually measured ejection fraction. Moderate-to-severely reduced systolic function. Unable to assess diastolic function. Wall Scoring  The left ventricular wall motion is globally hypokinetic. Right Ventricle  Normal cavity size. Borderline low systolic function. Aortic Valve  Trileaflet valve structure.  There is leaflet calcification. There is mild aortic stenosis. Trace aortic valve regurgitation. Mitral Valve  No stenosis. Mitral valve non-specific thickening. Mild mitral annular calcification. Trace regurgitation. Tricuspid Valve  Normal valve structure and no stenosis. Mild regurgitation. Pulmonic Valve  Normal valve structure and no stenosis. Trace regurgitation. Pulmonary Artery  Pulmonary arterial systolic pressure (PASP) is 40 mmHg. Pulmonary hypertension found to be mild. IMPRESSION & PLAN:  Mr. Eugenio Gandhi is 80 y.o. with multiple medical problem    Cardiomyopathy:  LVEF 30% by echo in April 2020. Most likely from A. fib and tachycardia. LVEF 25% in July 2020. Underwent cardioversion for atrial fibrillation successfully in July 2020  LVEF 35% in November 2020  LVEF 25-30% by echo in 02/2022  Currently on Toprol and Entresto. Using Lasix. Rhenda Comfort Despite medical management, his EF has not improved. He has dyspnea on exertion and extreme fatigue. We will proceed with left heart catheterization to rule out underlying coronary artery disease. Risk, benefit, alternative discussed with the patient and the wife. Despite his advanced age, he remains functionally very active and he would like to proceed with this test to make sure he does not have any underlying CAD causing this symptoms. Discussed regarding management strategy which includes medical management vs. Ischemia evaluation ( non-invasive vs. Invasive). Risk, benefit and alternatives of each strategy discussed in detail. Risk, benefit, complication of LHC and possible PCI ( including but not limited to bleeding, vascular trauma requiring surgery,  infection, heart failure, stroke, MI, emergent bypass surgery, severe allergic reactions, kidney failure, dialysis and death ) were discussed with patient and wife and willing to proceed with procedure.  Will be using moderate sedation   By stating these are possible risks, this does not exclude the potential for additional risks not named here. Atrial fibrillation:  Paroxysmal in nature according to patient. Initially diagnosed in 2018. Had cardioversion successfully in 2018 and again in 2019. Status post successful cardioversion again in July 2020  Back in a.fib in 11/2021. Continue BB. Starting digoxin today. Has not been able to use amiodarone in the past because of significant side effects  Unable to use Multaq because of congestive heart failure  I discussed with Dr. Xavier Wells from vascular surgery team in past and he would like for patient to continue Plavix despite being on Eliquis. Has seen electrophysiology colleague in July 2020. Suboptimal candidate for pulmonary vein isolation or antiarrhythmic medication. Rate control strategy has been recommended  Beta-blocker dose increased on last visit. Heart rate still as high as 130 bpm with minimal activity at home. Unable to increase beta-blocker further because of borderline blood pressure  Will use digoxin 0.125 mg daily. If difficult to control heart rate, and if persistent LV dysfunction on repeat echocardiogram, plan is to proceed with coronary evaluation and if no coronary artery disease, he will need to consider pacemaker/AICD and ?? AV node ablation for rate control    HLD:  Intolerance to statin with leg cramps and itching and skin rash. Tried 4 different statins in past.  Has decided against PCSK9 inhibitor in the past    Hypertension:  Currently on toprol and entresto. BP stable    COPD, emphysema:  Defer to Dr. Petrona Mcgregor. This plan was discussed with patient who is in agreement. Thank you for allowing me to participate in patient care. Please feel free to call me if you have any question or concern. Cherylene Maclachlan, MD  Please note: This document has been produced using voice recognition software. Unrecognized errors in transcription may be present.

## 2022-03-16 ENCOUNTER — HOSPITAL ENCOUNTER (OUTPATIENT)
Dept: LAB | Age: 87
Discharge: HOME | End: 2022-03-16
Payer: MEDICARE

## 2022-03-16 ENCOUNTER — HOSPITAL ENCOUNTER (OUTPATIENT)
Dept: LAB | Age: 87
Discharge: HOME | End: 2022-03-16

## 2022-03-16 DIAGNOSIS — Z01.818 PRE-OP TESTING: ICD-10-CM

## 2022-03-16 DIAGNOSIS — I42.8 OTHER CARDIOMYOPATHY (HCC): ICD-10-CM

## 2022-03-16 DIAGNOSIS — I48.0 PAF (PAROXYSMAL ATRIAL FIBRILLATION) (HCC): ICD-10-CM

## 2022-03-16 DIAGNOSIS — E78.00 PURE HYPERCHOLESTEROLEMIA: ICD-10-CM

## 2022-03-16 DIAGNOSIS — I10 ESSENTIAL HYPERTENSION WITH GOAL BLOOD PRESSURE LESS THAN 140/90: ICD-10-CM

## 2022-03-16 LAB
ANION GAP SERPL CALC-SCNC: 4 MMOL/L (ref 3–18)
BASOPHILS # BLD: 0.1 K/UL (ref 0–0.1)
BASOPHILS NFR BLD: 1 % (ref 0–2)
BUN SERPL-MCNC: 28 MG/DL (ref 7–18)
BUN/CREAT SERPL: 16 (ref 12–20)
CALCIUM SERPL-MCNC: 9.3 MG/DL (ref 8.5–10.1)
CHLORIDE SERPL-SCNC: 104 MMOL/L (ref 100–111)
CO2 SERPL-SCNC: 30 MMOL/L (ref 21–32)
CREAT SERPL-MCNC: 1.72 MG/DL (ref 0.6–1.3)
DIFFERENTIAL METHOD BLD: ABNORMAL
EOSINOPHIL # BLD: 0.2 K/UL (ref 0–0.4)
EOSINOPHIL NFR BLD: 2 % (ref 0–5)
ERYTHROCYTE [DISTWIDTH] IN BLOOD BY AUTOMATED COUNT: 14.8 % (ref 11.6–14.5)
GLUCOSE SERPL-MCNC: 99 MG/DL (ref 74–99)
HCT VFR BLD AUTO: 44.7 % (ref 36–48)
HGB BLD-MCNC: 13.7 G/DL (ref 13–16)
IMM GRANULOCYTES # BLD AUTO: 0 K/UL (ref 0–0.04)
IMM GRANULOCYTES NFR BLD AUTO: 0 % (ref 0–0.5)
INR PPP: 1.2 (ref 0.8–1.2)
LYMPHOCYTES # BLD: 1.3 K/UL (ref 0.9–3.6)
LYMPHOCYTES NFR BLD: 16 % (ref 21–52)
MCH RBC QN AUTO: 29.3 PG (ref 24–34)
MCHC RBC AUTO-ENTMCNC: 30.6 G/DL (ref 31–37)
MCV RBC AUTO: 95.7 FL (ref 78–100)
MONOCYTES # BLD: 0.8 K/UL (ref 0.05–1.2)
MONOCYTES NFR BLD: 10 % (ref 3–10)
NEUTS SEG # BLD: 5.7 K/UL (ref 1.8–8)
NEUTS SEG NFR BLD: 71 % (ref 40–73)
NRBC # BLD: 0 K/UL (ref 0–0.01)
NRBC BLD-RTO: 0 PER 100 WBC
PLATELET # BLD AUTO: 299 K/UL (ref 135–420)
PMV BLD AUTO: 10 FL (ref 9.2–11.8)
POTASSIUM SERPL-SCNC: 4.4 MMOL/L (ref 3.5–5.5)
PROTHROMBIN TIME: 15.4 SEC (ref 11.5–15.2)
RBC # BLD AUTO: 4.67 M/UL (ref 4.35–5.65)
SENTARA SPECIMEN COL,SENBCF: NORMAL
SODIUM SERPL-SCNC: 138 MMOL/L (ref 136–145)
WBC # BLD AUTO: 8 K/UL (ref 4.6–13.2)

## 2022-03-16 PROCEDURE — 99001 SPECIMEN HANDLING PT-LAB: CPT

## 2022-03-16 PROCEDURE — 85610 PROTHROMBIN TIME: CPT

## 2022-03-16 PROCEDURE — 85025 COMPLETE CBC W/AUTO DIFF WBC: CPT

## 2022-03-16 PROCEDURE — 80048 BASIC METABOLIC PNL TOTAL CA: CPT

## 2022-03-17 RX ORDER — DIGOXIN 125 MCG
0.12 TABLET ORAL DAILY
Qty: 90 TABLET | Refills: 3 | Status: SHIPPED | OUTPATIENT
Start: 2022-03-17 | End: 2022-08-30

## 2022-03-19 PROBLEM — I48.91 ATRIAL FIBRILLATION (HCC): Status: ACTIVE | Noted: 2020-07-15

## 2022-03-23 ENCOUNTER — HOSPITAL ENCOUNTER (OUTPATIENT)
Age: 87
Setting detail: OUTPATIENT SURGERY
Discharge: HOME | End: 2022-03-23
Attending: INTERNAL MEDICINE | Admitting: INTERNAL MEDICINE

## 2022-03-23 DIAGNOSIS — R53.83 FATIGUE: ICD-10-CM

## 2022-03-23 DIAGNOSIS — R06.00 DYSPNEA: ICD-10-CM

## 2022-03-23 NOTE — PROGRESS NOTES
Procedure cancelled by Dr Chen Mark due to pt not holding Elquis. Pt understands to hold and we will reschedule for Friday morning.

## 2022-03-25 ENCOUNTER — HOSPITAL ENCOUNTER (OUTPATIENT)
Age: 87
Setting detail: OUTPATIENT SURGERY
Discharge: HOME | End: 2022-03-25
Attending: INTERNAL MEDICINE | Admitting: INTERNAL MEDICINE
Payer: MEDICARE

## 2022-03-25 VITALS
OXYGEN SATURATION: 95 % | HEART RATE: 78 BPM | RESPIRATION RATE: 19 BRPM | SYSTOLIC BLOOD PRESSURE: 132 MMHG | DIASTOLIC BLOOD PRESSURE: 73 MMHG

## 2022-03-25 DIAGNOSIS — I25.10 CORONARY ARTERY DISEASE INVOLVING NATIVE CORONARY ARTERY OF NATIVE HEART WITHOUT ANGINA PECTORIS: ICD-10-CM

## 2022-03-25 LAB
ACT BLD: 249 SECS (ref 79–138)
CALCULATED R AXIS, ECG10: -38 DEGREES
CALCULATED T AXIS, ECG11: 85 DEGREES
DIAGNOSIS, 93000: NORMAL
Q-T INTERVAL, ECG07: 390 MS
QRS DURATION, ECG06: 104 MS
QTC CALCULATION (BEZET), ECG08: 402 MS
VENTRICULAR RATE, ECG03: 64 BPM

## 2022-03-25 PROCEDURE — 77030012597: Performed by: INTERNAL MEDICINE

## 2022-03-25 PROCEDURE — C1725 CATH, TRANSLUMIN NON-LASER: HCPCS | Performed by: INTERNAL MEDICINE

## 2022-03-25 PROCEDURE — 77030029997 HC DEV COM RDL R BND TELE -B: Performed by: INTERNAL MEDICINE

## 2022-03-25 PROCEDURE — C1887 CATHETER, GUIDING: HCPCS | Performed by: INTERNAL MEDICINE

## 2022-03-25 PROCEDURE — 74011250637 HC RX REV CODE- 250/637

## 2022-03-25 PROCEDURE — 93458 L HRT ARTERY/VENTRICLE ANGIO: CPT | Performed by: INTERNAL MEDICINE

## 2022-03-25 PROCEDURE — C1769 GUIDE WIRE: HCPCS | Performed by: INTERNAL MEDICINE

## 2022-03-25 PROCEDURE — 74011250637 HC RX REV CODE- 250/637: Performed by: INTERNAL MEDICINE

## 2022-03-25 PROCEDURE — 77030016699 HC CATH ANGI DX INFN1 CARD -A: Performed by: INTERNAL MEDICINE

## 2022-03-25 PROCEDURE — 77030015766: Performed by: INTERNAL MEDICINE

## 2022-03-25 PROCEDURE — 93005 ELECTROCARDIOGRAM TRACING: CPT

## 2022-03-25 PROCEDURE — 92928 PRQ TCAT PLMT NTRAC ST 1 LES: CPT | Performed by: INTERNAL MEDICINE

## 2022-03-25 PROCEDURE — 77030013761 HC KT HRT LFT ANGI -B: Performed by: INTERNAL MEDICINE

## 2022-03-25 PROCEDURE — C1894 INTRO/SHEATH, NON-LASER: HCPCS | Performed by: INTERNAL MEDICINE

## 2022-03-25 PROCEDURE — 74011000250 HC RX REV CODE- 250: Performed by: INTERNAL MEDICINE

## 2022-03-25 PROCEDURE — 99153 MOD SED SAME PHYS/QHP EA: CPT | Performed by: INTERNAL MEDICINE

## 2022-03-25 PROCEDURE — 74011250636 HC RX REV CODE- 250/636: Performed by: INTERNAL MEDICINE

## 2022-03-25 PROCEDURE — C1874 STENT, COATED/COV W/DEL SYS: HCPCS | Performed by: INTERNAL MEDICINE

## 2022-03-25 PROCEDURE — 99152 MOD SED SAME PHYS/QHP 5/>YRS: CPT | Performed by: INTERNAL MEDICINE

## 2022-03-25 PROCEDURE — 77030013797 HC KT TRNSDUC PRSSR EDWD -A: Performed by: INTERNAL MEDICINE

## 2022-03-25 PROCEDURE — 74011000636 HC RX REV CODE- 636: Performed by: INTERNAL MEDICINE

## 2022-03-25 PROCEDURE — 85347 COAGULATION TIME ACTIVATED: CPT

## 2022-03-25 DEVICE — STENT RONYX40015UX RESOLUTE ONYX 4.00X15
Type: IMPLANTABLE DEVICE | Status: FUNCTIONAL
Brand: RESOLUTE ONYX™

## 2022-03-25 RX ORDER — GUAIFENESIN 100 MG/5ML
81 LIQUID (ML) ORAL ONCE
Status: COMPLETED | OUTPATIENT
Start: 2022-03-25 | End: 2022-03-25

## 2022-03-25 RX ORDER — VERAPAMIL HYDROCHLORIDE 2.5 MG/ML
INJECTION, SOLUTION INTRAVENOUS AS NEEDED
Status: DISCONTINUED | OUTPATIENT
Start: 2022-03-25 | End: 2022-03-25 | Stop reason: HOSPADM

## 2022-03-25 RX ORDER — HEPARIN SODIUM 200 [USP'U]/100ML
INJECTION, SOLUTION INTRAVENOUS
Status: COMPLETED | OUTPATIENT
Start: 2022-03-25 | End: 2022-03-25

## 2022-03-25 RX ORDER — SODIUM CHLORIDE 9 MG/ML
1000 INJECTION, SOLUTION INTRAVENOUS CONTINUOUS
Status: DISCONTINUED | OUTPATIENT
Start: 2022-03-25 | End: 2022-03-25 | Stop reason: HOSPADM

## 2022-03-25 RX ORDER — GUAIFENESIN 100 MG/5ML
81 LIQUID (ML) ORAL DAILY
Status: CANCELLED | OUTPATIENT
Start: 2022-03-25

## 2022-03-25 RX ORDER — ATROPINE SULFATE 0.4 MG/ML
0.5 INJECTION, SOLUTION ENDOTRACHEAL; INTRAMEDULLARY; INTRAMUSCULAR; INTRAVENOUS; SUBCUTANEOUS AS NEEDED
Status: CANCELLED | OUTPATIENT
Start: 2022-03-25

## 2022-03-25 RX ORDER — HYDRALAZINE HYDROCHLORIDE 20 MG/ML
20 INJECTION INTRAMUSCULAR; INTRAVENOUS
Status: CANCELLED | OUTPATIENT
Start: 2022-03-25 | End: 2022-03-25

## 2022-03-25 RX ORDER — CLOPIDOGREL 300 MG/1
TABLET, FILM COATED ORAL AS NEEDED
Status: DISCONTINUED | OUTPATIENT
Start: 2022-03-25 | End: 2022-03-25 | Stop reason: HOSPADM

## 2022-03-25 RX ORDER — LIDOCAINE HYDROCHLORIDE 10 MG/ML
INJECTION, SOLUTION EPIDURAL; INFILTRATION; INTRACAUDAL; PERINEURAL AS NEEDED
Status: DISCONTINUED | OUTPATIENT
Start: 2022-03-25 | End: 2022-03-25 | Stop reason: HOSPADM

## 2022-03-25 RX ORDER — SODIUM CHLORIDE 0.9 % (FLUSH) 0.9 %
5-40 SYRINGE (ML) INJECTION EVERY 8 HOURS
Status: DISCONTINUED | OUTPATIENT
Start: 2022-03-25 | End: 2022-03-25 | Stop reason: HOSPADM

## 2022-03-25 RX ORDER — GUAIFENESIN 100 MG/5ML
LIQUID (ML) ORAL
Status: COMPLETED
Start: 2022-03-25 | End: 2022-03-25

## 2022-03-25 RX ORDER — HEPARIN SODIUM 1000 [USP'U]/ML
INJECTION, SOLUTION INTRAVENOUS; SUBCUTANEOUS AS NEEDED
Status: DISCONTINUED | OUTPATIENT
Start: 2022-03-25 | End: 2022-03-25 | Stop reason: HOSPADM

## 2022-03-25 RX ORDER — ACETAMINOPHEN 325 MG/1
650 TABLET ORAL
Status: CANCELLED | OUTPATIENT
Start: 2022-03-25

## 2022-03-25 RX ORDER — FENTANYL CITRATE 50 UG/ML
INJECTION, SOLUTION INTRAMUSCULAR; INTRAVENOUS AS NEEDED
Status: DISCONTINUED | OUTPATIENT
Start: 2022-03-25 | End: 2022-03-25 | Stop reason: HOSPADM

## 2022-03-25 RX ORDER — ONDANSETRON 2 MG/ML
4 INJECTION INTRAMUSCULAR; INTRAVENOUS
Status: CANCELLED | OUTPATIENT
Start: 2022-03-25 | End: 2022-03-26

## 2022-03-25 RX ORDER — SODIUM CHLORIDE 0.9 % (FLUSH) 0.9 %
5-40 SYRINGE (ML) INJECTION AS NEEDED
Status: CANCELLED | OUTPATIENT
Start: 2022-03-25

## 2022-03-25 RX ORDER — NITROGLYCERIN 0.4 MG/1
0.4 TABLET SUBLINGUAL
Status: CANCELLED | OUTPATIENT
Start: 2022-03-25 | End: 2022-03-26

## 2022-03-25 RX ORDER — MIDAZOLAM HYDROCHLORIDE 1 MG/ML
INJECTION, SOLUTION INTRAMUSCULAR; INTRAVENOUS AS NEEDED
Status: DISCONTINUED | OUTPATIENT
Start: 2022-03-25 | End: 2022-03-25 | Stop reason: HOSPADM

## 2022-03-25 RX ORDER — SODIUM CHLORIDE 9 MG/ML
75 INJECTION, SOLUTION INTRAVENOUS CONTINUOUS
Status: CANCELLED | OUTPATIENT
Start: 2022-03-25

## 2022-03-25 RX ORDER — SODIUM CHLORIDE 0.9 % (FLUSH) 0.9 %
5-40 SYRINGE (ML) INJECTION AS NEEDED
Status: DISCONTINUED | OUTPATIENT
Start: 2022-03-25 | End: 2022-03-25 | Stop reason: HOSPADM

## 2022-03-25 RX ORDER — SODIUM CHLORIDE 9 MG/ML
10 INJECTION INTRAMUSCULAR; INTRAVENOUS; SUBCUTANEOUS
Status: CANCELLED | OUTPATIENT
Start: 2022-03-25 | End: 2022-03-26

## 2022-03-25 RX ORDER — SODIUM CHLORIDE 0.9 % (FLUSH) 0.9 %
5-40 SYRINGE (ML) INJECTION EVERY 8 HOURS
Status: CANCELLED | OUTPATIENT
Start: 2022-03-25

## 2022-03-25 RX ADMIN — ASPIRIN 81 MG: 81 TABLET, CHEWABLE ORAL at 08:09

## 2022-03-25 RX ADMIN — Medication 81 MG: at 08:09

## 2022-03-25 NOTE — Clinical Note
TRANSFER - OUT REPORT:     [facility] Report consisted of patient's Situation, Background, Assessment and   Recommendations(SBAR). Opportunity for questions and clarification was provided.

## 2022-03-25 NOTE — H&P
Please see clinic note  for detail. Patient with persistent dyspnea and significant life limiting fatigue and significent LV dysfunction despite appropriate management for a.fib and CHF. Will need to rule out CAD  I saw and examined patient and confirmed above. No interval change. Labs reviewed. Procedure explained to patient and all risk and benefit discussed with patient. Risk, benefit, complication of LHC and possible PCI ( including but not limited to bleeding, infection, heart failure, stroke, MI, emergent bypass surgery, kidney failure, dialysis and death ) were discussed with patient and willing to proceed with procedure. Proceed as planned. DW patient. History and physical has been reviewed.  There have been no significant clinical changes since the completion of the originally dated History and Physical.  Will be using moderate sedation.    ------------------------------------------------------------------------------------------------------------------

## 2022-03-25 NOTE — Clinical Note
TRANSFER - IN REPORT:     Verbal report received from: [address]. Report consisted of patient's Situation, Background, Assessment and   Recommendations(SBAR). Opportunity for questions and clarification was provided. Assessment completed upon patient's arrival to unit and care assumed. Patient transported with a Cardiac Cath Tech / Patient Care Tech.

## 2022-03-25 NOTE — PROGRESS NOTES
TR band removed, no bleeding or hematoma formation noted.  Pressure dressing applied ( quick clot, tegaderm , co band )

## 2022-03-25 NOTE — PROGRESS NOTES
TRANSFER - OUT REPORT:    Verbal report given to [facility]  on [address] [facility].  being transferred to [facility] for ordered procedure       Report consisted of patients Situation, Background, Assessment and   Recommendations(SBAR). Information from the following report(s) SBAR, Procedure Summary and MAR was reviewed with the receiving nurse. Lines:       Opportunity for questions and clarification was provided.       Patient transported with:   [employer]

## 2022-03-25 NOTE — PROGRESS NOTES
TRANSFER - IN REPORT:    Verbal report received from [address] [place] [address] on [address] [facility].  being received from [facility] for routine progression of care      Report consisted of patients Situation, Background, Assessment and   Recommendations(SBAR). Information from the following report(s) SBAR, Procedure Summary and MAR was reviewed with the receiving nurse. Opportunity for questions and clarification was provided. Assessment completed upon patients arrival to unit and care assumed.

## 2022-03-25 NOTE — PROGRESS NOTES
Pre-Discharge Checklist for Same Day Discharge Post PCI      1. Confirm that loading dose of P2Y12i has been administered. YES    2. Confirm the patient has received prescriptions for at least 30 days of P2Y12i. YES    3. Confirm prescription for aspirin and statin. YES    4. Confirm referral to cardiac rehab. YES    5. Charge nurse plans on calling patient the day after discharge. YES    6. The cath holding nurse has provided education to patient on how to monitor access site, and the importance of taking DAPT as prescribed and the specific risks of premature discontinuation. YES    7. The cath holding nurse has provided the patient with an emergency number to call. YES    8. The cath holding nurse has scheduled a follow up appointment.   YES

## 2022-03-25 NOTE — DISCHARGE INSTRUCTIONS
DISCHARGE SUMMARY from Nurse    PATIENT INSTRUCTIONS:    After general anesthesia or intravenous sedation, for 24 hours or while taking prescription Narcotics:  · Limit your activities  · Do not drive and operate hazardous machinery  · Do not make important personal or business decisions  · Do  not drink alcoholic beverages  · If you have not urinated within 8 hours after discharge, please contact your surgeon on call. Report the following to your surgeon:  · Excessive pain, swelling, redness or odor of or around the surgical area  · Temperature over 100.5  · Nausea and vomiting lasting longer than 4 hours or if unable to take medications  · Any signs of decreased circulation or nerve impairment to extremity: change in color, persistent  numbness, tingling, coldness or increase pain  · Any questions    What to do at Home:  Recommended activity: Activity as tolerated and no driving for today,     These are general instructions for a healthy lifestyle:    No smoking/ No tobacco products/ Avoid exposure to second hand smoke  Surgeon General's Warning:  Quitting smoking now greatly reduces serious risk to your health. Obesity, smoking, and sedentary lifestyle greatly increases your risk for illness    A healthy diet, regular physical exercise & weight monitoring are important for maintaining a healthy lifestyle    You may be retaining fluid if you have a history of heart failure or if you experience any of the following symptoms:  Weight gain of 3 pounds or more overnight or 5 pounds in a week, increased swelling in our hands or feet or shortness of breath while lying flat in bed. Please call your doctor as soon as you notice any of these symptoms; do not wait until your next office visit. The discharge information has been reviewed with the patient. The patient verbalized understanding.   Discharge medications reviewed with the patient and appropriate educational materials and side effects teaching were provided. ___________________________________________________________________________________________________________________________________HEART CATHETERIZATION/ANGIOGRAPHY DISCHARGE INSTRUCTIONS    1. Check puncture site frequently for swelling or bleeding. If there is any bleeding, lie down and apply pressure over the area with a clean towel or washcloth. Notify your doctor for any redness, swelling, drainage, or oozing from the puncture site. Notify your doctor for any fever or chills. 2. If the extremity becomes cold, numb, or painful call Dr. Cassia Ruelas  3. Activity should be limited for the next 48 hours. Climb stairs as little as possible and avoid any stooping, bending, or strenuous activity for 48 hours. No heavy lifting (anything over 10 pounds) for 3 days. 4. You may resume your usual diet. Drink more fluids than usual.  5. Have a responsible person drive you home and stay with you for at least 24 hours after your heart catheterization/angiography. 6. You may remove bandage from your Right and Arm in 24 hours. You may shower in 24 hours. No tub baths, hot tubs, or swimming for 1 week. Do not place any lotions, creams, powders, or ointments over puncture site for 1 week. You may place a clean band-aid over the puncture site each day for 5 days. Change daily. I have read the above instructions and have had the opportunity to ask questions.       Patient: ________________________   Date: 3/25/2022    Witness: _______________________   Date: 3/25/2022

## 2022-03-28 ENCOUNTER — TELEPHONE (OUTPATIENT)
Dept: CARDIOLOGY CLINIC | Age: 87
End: 2022-03-28

## 2022-03-28 NOTE — TELEPHONE ENCOUNTER
Patients' wife called to move up appt to see foy after cath. Wife states that Shabbir Phillips wants him to see Mr. Kate Avitia earlier than his May 3rd appt. Informed patient that we will give her a call back tomorrow after confirming with the provider. Wife also has questions regarding his medications that hes been taking before and after cath.

## 2022-03-29 NOTE — TELEPHONE ENCOUNTER
Attempted to contact pt at  number, no answer. Lvm for pt to return call to office at [phone]. Will continue to try to contact pt. Verbal order and read back per Etter Lundborg, MD  Pt needs to be seen within 4 weeks. I would like for his to discontinue aspirin 81 mg in 3 weeks.

## 2022-03-30 NOTE — TELEPHONE ENCOUNTER
Contacted pts wife at [facility] number. Two patient Identifiers confirmed. Advised pt per Dr Chepe Peters. Pts wife verbalized understanding. Appt rescheduled.

## 2022-04-11 RX ORDER — FUROSEMIDE 40 MG/1
TABLET ORAL
Qty: 90 TABLET | Refills: 3 | Status: SHIPPED | OUTPATIENT
Start: 2022-04-11 | End: 2022-08-30 | Stop reason: DRUGHIGH

## 2022-04-13 ENCOUNTER — HOSPITAL ENCOUNTER (OUTPATIENT)
Dept: LAB | Age: 87
Discharge: HOME | End: 2022-04-13
Payer: MEDICARE

## 2022-04-13 LAB
APPEARANCE UR: CLEAR
BACTERIA URNS QL MICRO: ABNORMAL /HPF
BASOPHILS # BLD: 0.1 K/UL (ref 0–0.1)
BASOPHILS NFR BLD: 1 % (ref 0–2)
BILIRUB UR QL: NEGATIVE
COLOR UR: YELLOW
DIFFERENTIAL METHOD BLD: ABNORMAL
EOSINOPHIL # BLD: 0.1 K/UL (ref 0–0.4)
EOSINOPHIL NFR BLD: 2 % (ref 0–5)
EPITH CASTS URNS QL MICRO: ABNORMAL /LPF (ref 0–5)
ERYTHROCYTE [DISTWIDTH] IN BLOOD BY AUTOMATED COUNT: 14.2 % (ref 11.6–14.5)
ERYTHROCYTE [SEDIMENTATION RATE] IN BLOOD: 44 MM/HR (ref 0–20)
GLUCOSE UR STRIP.AUTO-MCNC: NEGATIVE MG/DL
HCT VFR BLD AUTO: 43.6 % (ref 36–48)
HGB BLD-MCNC: 13.7 G/DL (ref 13–16)
HGB UR QL STRIP: NEGATIVE
IMM GRANULOCYTES # BLD AUTO: 0 K/UL (ref 0–0.04)
IMM GRANULOCYTES NFR BLD AUTO: 0 % (ref 0–0.5)
KETONES UR QL STRIP.AUTO: ABNORMAL MG/DL
LEUKOCYTE ESTERASE UR QL STRIP.AUTO: NEGATIVE
LYMPHOCYTES # BLD: 1.7 K/UL (ref 0.9–3.6)
LYMPHOCYTES NFR BLD: 20 % (ref 21–52)
MCH RBC QN AUTO: 29.7 PG (ref 24–34)
MCHC RBC AUTO-ENTMCNC: 31.4 G/DL (ref 31–37)
MCV RBC AUTO: 94.6 FL (ref 78–100)
MONOCYTES # BLD: 0.7 K/UL (ref 0.05–1.2)
MONOCYTES NFR BLD: 9 % (ref 3–10)
MUCOUS THREADS URNS QL MICRO: ABNORMAL /LPF
NEUTS SEG # BLD: 5.8 K/UL (ref 1.8–8)
NEUTS SEG NFR BLD: 69 % (ref 40–73)
NITRITE UR QL STRIP.AUTO: NEGATIVE
NRBC # BLD: 0 K/UL (ref 0–0.01)
NRBC BLD-RTO: 0 PER 100 WBC
PH UR STRIP: 5.5 [PH] (ref 5–8)
PLATELET # BLD AUTO: 281 K/UL (ref 135–420)
PMV BLD AUTO: 10.9 FL (ref 9.2–11.8)
PROT UR STRIP-MCNC: ABNORMAL MG/DL
RBC # BLD AUTO: 4.61 M/UL (ref 4.35–5.65)
SP GR UR REFRACTOMETRY: 1.02 (ref 1–1.03)
URATE SERPL-MCNC: 9.8 MG/DL (ref 2.6–7.2)
UROBILINOGEN UR QL STRIP.AUTO: 1 EU/DL (ref 0.2–1)
WBC # BLD AUTO: 8.4 K/UL (ref 4.6–13.2)
WBC URNS QL MICRO: ABNORMAL /HPF (ref 0–4)

## 2022-04-13 PROCEDURE — 36415 COLL VENOUS BLD VENIPUNCTURE: CPT

## 2022-04-13 PROCEDURE — 85652 RBC SED RATE AUTOMATED: CPT

## 2022-04-13 PROCEDURE — 81001 URINALYSIS AUTO W/SCOPE: CPT

## 2022-04-13 PROCEDURE — 85025 COMPLETE CBC W/AUTO DIFF WBC: CPT

## 2022-04-13 PROCEDURE — 84550 ASSAY OF BLOOD/URIC ACID: CPT

## 2022-04-28 ENCOUNTER — OFFICE VISIT (OUTPATIENT)
Dept: CARDIOLOGY CLINIC | Age: 87
End: 2022-04-28
Payer: MEDICARE

## 2022-04-28 VITALS
OXYGEN SATURATION: 98 % | HEART RATE: 88 BPM | BODY MASS INDEX: 30.11 KG/M2 | WEIGHT: 228.2 LBS | SYSTOLIC BLOOD PRESSURE: 118 MMHG | DIASTOLIC BLOOD PRESSURE: 82 MMHG | TEMPERATURE: 97.8 F

## 2022-04-28 DIAGNOSIS — I25.10 CORONARY ARTERY DISEASE INVOLVING NATIVE CORONARY ARTERY OF NATIVE HEART WITHOUT ANGINA PECTORIS: Primary | ICD-10-CM

## 2022-04-28 DIAGNOSIS — I50.20 SYSTOLIC CONGESTIVE HEART FAILURE, UNSPECIFIED HF CHRONICITY (HCC): ICD-10-CM

## 2022-04-28 PROCEDURE — G8536 NO DOC ELDER MAL SCRN: HCPCS | Performed by: INTERNAL MEDICINE

## 2022-04-28 PROCEDURE — G8417 CALC BMI ABV UP PARAM F/U: HCPCS | Performed by: INTERNAL MEDICINE

## 2022-04-28 PROCEDURE — G8428 CUR MEDS NOT DOCUMENT: HCPCS | Performed by: INTERNAL MEDICINE

## 2022-04-28 PROCEDURE — 1101F PT FALLS ASSESS-DOCD LE1/YR: CPT | Performed by: INTERNAL MEDICINE

## 2022-04-28 PROCEDURE — 99214 OFFICE O/P EST MOD 30 MIN: CPT | Performed by: INTERNAL MEDICINE

## 2022-04-28 PROCEDURE — G8510 SCR DEP NEG, NO PLAN REQD: HCPCS | Performed by: INTERNAL MEDICINE

## 2022-04-28 NOTE — PROGRESS NOTES
Identified pt with two pt identifiers(name and ). Reviewed record in preparation for visit and have obtained necessary documentation. Jael Marques. presents today for No chief complaint on file. Pt denies DIZZINESS, SOB, CHEST PAIN/ PRESSURE, FATIGUE/WEAKNESS, HEADACHES, SWELLING. Joel Caro. preferred language for health care discussion is english/other. Personal Protective Equipment:   Personal Protective Equipment was used including: mask-surgical and hands-gloves. Patient was placed on no precaution(s). Patient was masked. Precautions:   Patient currently on None  Patient currently roomed with door closed. Is someone accompanying this pt? yes    Is the patient using any DME equipment during OV? No     Depression Screening:  3 most recent PHQ Screens 3/15/2022   Little interest or pleasure in doing things Not at all   Feeling down, depressed, irritable, or hopeless Not at all   Total Score PHQ 2 0       Learning Assessment:  Learning Assessment 2020   PRIMARY LEARNER Patient   PRIMARY LANGUAGE ENGLISH   LEARNER PREFERENCE PRIMARY DEMONSTRATION   ANSWERED BY patient   RELATIONSHIP SELF       Abuse Screening:  Abuse Screening Questionnaire 2020   Do you ever feel afraid of your partner? N   Are you in a relationship with someone who physically or mentally threatens you? N   Is it safe for you to go home? Y       Fall Risk  Fall Risk Assessment, last 12 mths 3/15/2022   Able to walk? Yes   Fall in past 12 months? 0   Do you feel unsteady? 0   Are you worried about falling 0   Number of falls in past 12 months -   Fall with injury? -       Pt currently taking Anticoagulant therapy? yes  Pt currently taking Antiplatelet therapy? No     Coordination of Care:  1. Have you been to the ER, urgent care clinic since your last visit? Hospitalized since your last visit? No     2.  Have you seen or consulted any other health care providers outside of the [facility] since your last visit? Include any pap smears or colon screening. No       Please see Red banners under Allergies and Med Rec to remove outside inquires. All correct information has been verified with patient and added to chart.      Medication's patient's would liked removed has been marked not taking to be removed per Verbal order and read back per Jesse Hernandez MD

## 2022-04-28 NOTE — PROGRESS NOTES
[facility]    Mr. Jaiden Paez is 80 y.o. 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 Patient is here today for follow-up appointment after having cardiac catheterization and coronary stent. He feels like he is alertness and mental status is slightly better. He continues to have fatigue and tiredness. He has some dyspnea. No chest pain or chest tightness. He is taking his medication as prescribed. Past Medical History:   Diagnosis Date    A-fib [facility])     S/P Cardioversion X 2 (. )    AAA (abdominal aortic [facility]) ([facility]     S/P Endovascular graft repair    Amiodarone pulmonary toxicity     CAD (coronary artery disease)     CAD (coronary artery disease)     S/P m RCA 4.0 X 15 mm USHA ()    Cardiomyopathy ([facility] [address]     LVEF 35() 25% () 30% ()    COPD (chronic obstructive pulmonary disease) (HCC)     GERD (gastroesophageal reflux disease)     HTN (hypertension)     Sleep apnea     CPAP         Past Surgical History:   Procedure Laterality Date    HX CATARACT REMOVAL Bilateral     HX CERVICAL FUSION      HX KNEE REPLACEMENT Right     NEUROLOGICAL PROCEDURE UNLISTED      FL CARDIAC SURG PROCEDURE UNLIST      ENDOGRAFT AORTA    [place] CARDIOVERSION ELECTIVE ARRHYTHMIA EXTERNAL N/A 2020    EP CARDIOVERSION performed by Santhosh Austin MD at [facility] CATH LAB    VASCULAR SURGERY PROCEDURE UNLIST      STENT RENAL ARTERY       Current Outpatient Medications   Medication Sig    furosemide (LASIX) 40 mg tablet TAKE 1 TABLET BY MOUTH EVERY DAY    cloNIDine HCL (CATAPRES) 0.1 mg tablet TAKE 1 TAB BY MOUTH DAILY. TAKE WHEN SYSTOLIC IS GREATER THAN 420 ONLY.  sacubitriL-valsartan (Entresto) 24-26 mg tablet Take 1 Tablet by mouth two (2) times a day.     clopidogreL (Plavix) 75 mg tab Take  by mouth.  Eliquis 5 mg tablet TAKE 1 TABLET BY MOUTH TWICE A DAY    metoprolol succinate (TOPROL-XL) 25 mg XL tablet Take 1 Tablet by mouth two (2) times a day. (Patient taking differently: Take 37.5 mg by mouth two (2) times a day.)    co-enzyme Q-10 (CoQ-10) 100 mg capsule Take 200 mg by mouth daily.  digoxin (LANOXIN) 0.125 mg tablet Take 1 Tablet by mouth daily.  budesonide (PULMICORT) 0.5 mg/2 mL nbsp 500 mcg by Nebulization route.  budesonide-formoteroL (Symbicort) 160-4.5 mcg/actuation HFAA Take 2 Puffs by inhalation.  magnesium oxide (MAG-OX) 400 mg tablet Take 400 mg by mouth daily.  multivitamin (ONE A DAY) tablet Take 1 Tab by mouth daily.  cholecalciferol, vitamin D3, (VITAMIN D3 PO) Take  by mouth.  melatonin 5 mg tablet Take  by mouth nightly as needed.  formoterol fumarate (PERFOROMIST IN) Take  by inhalation.  revefenacin (Yupelri) 175 mcg/3 mL nebu nebulizer solution 175 mcg by Nebulization route daily.  rOPINIRole (REQUIP) 1 mg tablet TAKE 1 TABLET BY MOUTH EVERY DAY     No current facility-administered medications for this visit. Allergies and Sensitivities:  Allergies   Allergen Reactions    Amiodarone Other (comments)    Statins-Hmg-Coa Reductase Inhibitors Other (comments)     myalgia       Family History:  No family history on file. Social History:  Social History     Tobacco Use    Smoking status: Former Smoker    Smokeless tobacco: Never Used   Substance Use Topics    Alcohol use: Yes     Alcohol/week: 3.0 standard drinks     Types: 3 Glasses of wine per week    Drug use: Never     He  reports that he has quit smoking. He has never used smokeless tobacco.  He  reports current alcohol use of about 3.0 standard drinks of alcohol per week. Review of Systems:  Cardiac symptoms as noted above in HPI. All others negative.     Physical Exam:  BP Readings from Last 3 Encounters:   04/28/22 118/82   03/25/22 132/73   03/15/22 114/70         Pulse Readings from Last 3 Encounters:   04/28/22 88   03/25/22 78   03/15/22 (!) 104          Wt Readings from Last 3 Encounters:   04/28/22 103.5 kg (228 lb 3.2 oz)   03/15/22 105.2 kg (232 lb)   02/01/22 107.5 kg (237 lb)       Constitutional: Oriented to person, place, and time. HENT: Head: Normocephalic and atraumatic. Neck: No JVD present. Carotid bruit is not appreciated. Cardiovascular: Irregular rhythm. No murmur, gallop or rubs appreciated  Lung: Breath sounds normal. No respiratory distress. No ronchi or rales appreciated  Abdominal: No tenderness. No rebound and no guarding. Musculoskeletal: There is trace + ankle edema. No cynosis    Review of Data  LABS:   Lab Results   Component Value Date/Time    Sodium 138 03/16/2022 10:56 AM    Potassium 4.4 03/16/2022 10:56 AM    Chloride 104 03/16/2022 10:56 AM    CO2 30 03/16/2022 10:56 AM    Glucose 99 03/16/2022 10:56 AM    BUN 28 (H) 03/16/2022 10:56 AM    Creatinine 1.72 (H) 03/16/2022 10:56 AM     No flowsheet data found. Lab Results   Component Value Date/Time    ALT (SGPT) 53 06/08/2020 09:39 AM     No results found for: HBA1C, WBI5PSIS, WWY4FTVA, AHX1PJUE    EKG  Atrial fibrillation with rapid ventricular response at heart rate of 120 bpm.  Left bundle branch block    ECHO (07/20)  Left Ventricle  Normal cavity size. Mild concentric hypertrophy. Moderate-to-severe systolic dysfunction. The estimated ejection fraction is 20 - 25%. Visually measured ejection fraction. LV wall motion is noted to be hypokinetic. Atrial fibrillation observed. Wall Scoring  The left ventricular wall motion is globally hypokinetic. ECHO (11/20)  Left Ventricle  Normal wall thickness. Mildly dilated left ventricle. The calculated EF is 35%. Visually measured ejection fraction. Moderate-to-severely reduced systolic function. Unable to assess diastolic function.     Wall Scoring  The left ventricular wall motion is globally hypokinetic. Right Ventricle  Normal cavity size. Borderline low systolic function. Aortic Valve  Trileaflet valve structure. There is leaflet calcification. There is mild aortic stenosis. Trace aortic valve regurgitation. Mitral Valve  No stenosis. Mitral valve non-specific thickening. Mild mitral annular calcification. Trace regurgitation. Tricuspid Valve  Normal valve structure and no stenosis. Mild regurgitation. Pulmonic Valve  Normal valve structure and no stenosis. Trace regurgitation. Pulmonary Artery  Pulmonary arterial systolic pressure (PASP) is 40 mmHg. Pulmonary hypertension found to be mild. 03/25/22    CARDIAC PROCEDURE 03/25/2022 3/25/2022  Conclusion  LM: Normal  LAD: Mid 40% narrowing after origin of bifurcating diagonal branch. Diagonal branch: Ostial 80% stenosis with early bifurcation into superior inferior branch without any other obstructive disease medical management for diagonal branch disease  LCx/OM: Normal  RCA: Mid 90% tubular stenosis  Status post PCI and stenting of mid RCA 90% stenosis using 4.0 x 15 mm USHA    IMPRESSION & PLAN:  Mr. Alfonso Mancia is 80 y.o. with multiple medical problem    CAD:  Cardiac catheterization in 03/2022 with 90% mid RCA stenosis requiring 4.0 x 15 mm USHA  Mild-moderate LAD and 80% ostial diagonal disease being managed medically  Continue Plavix and Eliquis  Continue beta-blocker. He has been intolerant to 4 different statins in the past.  Has decided against PCSK9 inhibitor      Cardiomyopathy:  LVEF 30% by echo in April 2020. Most likely from A. fib and tachycardia. LVEF 25% in July 2020. Underwent cardioversion for atrial fibrillation successfully in July 2020  LVEF 35% in November 2020  LVEF 25-30% by echo in 02/2022  Currently on Toprol and Entresto. Using Lasix. .   We will repeat echocardiogram after recent coronary revascularization.   If he has not improved, can consider AICD evaluation    Atrial fibrillation:  Paroxysmal in nature according to patient. Initially diagnosed in 2018. Had cardioversion successfully in 2018 and again in 2019. Status post successful cardioversion again in July 2020  Back in a.fib in 11/2021. Continue BB and digoxin. Has not been able to use amiodarone in the past because of significant side effects and pulmonary toxicity. Unable to use Multaq because of congestive heart failure  I discussed with Dr. Jennifer Roberto from vascular surgery team in past and he would like for patient to continue Plavix despite being on Eliquis. Has seen electrophysiology colleague in July 2020. Suboptimal candidate for pulmonary vein isolation or antiarrhythmic medication. Rate control strategy has been recommended  Heart rate 88 bpm today. Continue beta-blocker and digoxin for now  If difficult to control heart rate, , he will need to consider pacemaker/AICD and ?? AV node ablation for rate control     HLD:  Intolerance to statin with leg cramps and itching and skin rash. Tried 4 different statins in past.  Has decided against PCSK9 inhibitor in the past    Hypertension:  Currently on toprol and entresto. BP stable    COPD, emphysema:  Defer to Dr. Kilo Acuña. This plan was discussed with patient who is in agreement. Thank you for allowing me to participate in patient care. Please feel free to call me if you have any question or concern. Batsheva Peters MD  Please note: This document has been produced using voice recognition software. Unrecognized errors in transcription may be present.

## 2022-04-28 NOTE — PATIENT INSTRUCTIONS
Testing   Echo( limited) in 3 months  **call office 3-5 days after testing is completed for results**

## 2022-04-28 NOTE — LETTER
4/28/2022    Patient: Marielle Gonzales. YOB: 1931   Date of Visit: 4/28/2022     Vern Martin MD  [address] [facility].  [address] [address]  Via Fax: [phone]    Dear Vern Martin MD,      Thank you for referring Mr. Richard Agudelo to [address] [place] [address] for evaluation. My notes for this consultation are attached. If you have questions, please do not hesitate to call me. I look forward to following your patient along with you.       Sincerely,    Kristel Giang MD

## 2022-05-05 RX ORDER — METOPROLOL SUCCINATE 25 MG/1
TABLET, EXTENDED RELEASE ORAL
Qty: 90 TABLET | Refills: 1 | Status: SHIPPED | OUTPATIENT
Start: 2022-05-05 | End: 2022-05-16

## 2022-05-16 RX ORDER — METOPROLOL SUCCINATE 25 MG/1
TABLET, EXTENDED RELEASE ORAL
Qty: 180 TABLET | Refills: 2 | Status: SHIPPED | OUTPATIENT
Start: 2022-05-16 | End: 2022-08-30

## 2022-07-25 RX ORDER — OLMESARTAN MEDOXOMIL 40 MG/1
TABLET ORAL
Qty: 90 TABLET | Refills: 3 | Status: SHIPPED | OUTPATIENT
Start: 2022-07-25 | End: 2022-08-30

## 2022-07-27 ENCOUNTER — TELEPHONE (OUTPATIENT)
Dept: CARDIOLOGY CLINIC | Age: 87
End: 2022-07-27

## 2022-08-01 ENCOUNTER — HOSPITAL ENCOUNTER (OUTPATIENT)
Dept: LAB | Age: 87
Discharge: HOME | End: 2022-08-01
Payer: MEDICARE

## 2022-08-01 ENCOUNTER — TELEPHONE (OUTPATIENT)
Dept: CARDIOLOGY CLINIC | Age: 87
End: 2022-08-01

## 2022-08-01 LAB
25(OH)D3 SERPL-MCNC: 63.4 NG/ML (ref 30–100)
BASOPHILS # BLD: 0 K/UL (ref 0–0.1)
BASOPHILS NFR BLD: 1 % (ref 0–2)
DIFFERENTIAL METHOD BLD: ABNORMAL
EOSINOPHIL # BLD: 0.2 K/UL (ref 0–0.4)
EOSINOPHIL NFR BLD: 2 % (ref 0–5)
ERYTHROCYTE [DISTWIDTH] IN BLOOD BY AUTOMATED COUNT: 14.6 % (ref 11.6–14.5)
HCT VFR BLD AUTO: 40.7 % (ref 36–48)
HGB BLD-MCNC: 13.2 G/DL (ref 13–16)
IMM GRANULOCYTES # BLD AUTO: 0 K/UL (ref 0–0.04)
IMM GRANULOCYTES NFR BLD AUTO: 0 % (ref 0–0.5)
LYMPHOCYTES # BLD: 1.4 K/UL (ref 0.9–3.6)
LYMPHOCYTES NFR BLD: 18 % (ref 21–52)
MCH RBC QN AUTO: 30.3 PG (ref 24–34)
MCHC RBC AUTO-ENTMCNC: 32.4 G/DL (ref 31–37)
MCV RBC AUTO: 93.3 FL (ref 78–100)
MONOCYTES # BLD: 0.7 K/UL (ref 0.05–1.2)
MONOCYTES NFR BLD: 9 % (ref 3–10)
NEUTS SEG # BLD: 5.3 K/UL (ref 1.8–8)
NEUTS SEG NFR BLD: 70 % (ref 40–73)
NRBC # BLD: 0 K/UL (ref 0–0.01)
NRBC BLD-RTO: 0 PER 100 WBC
PLATELET # BLD AUTO: 209 K/UL (ref 135–420)
PMV BLD AUTO: 10.8 FL (ref 9.2–11.8)
RBC # BLD AUTO: 4.36 M/UL (ref 4.35–5.65)
T3FREE SERPL-MCNC: 2.4 PG/ML (ref 2.18–3.98)
T4 FREE SERPL-MCNC: 0.8 NG/DL (ref 0.7–1.5)
TSH SERPL DL<=0.05 MIU/L-ACNC: 1.31 UIU/ML (ref 0.36–3.74)
WBC # BLD AUTO: 7.6 K/UL (ref 4.6–13.2)

## 2022-08-01 PROCEDURE — 84481 FREE ASSAY (FT-3): CPT

## 2022-08-01 PROCEDURE — 84443 ASSAY THYROID STIM HORMONE: CPT

## 2022-08-01 PROCEDURE — 36415 COLL VENOUS BLD VENIPUNCTURE: CPT

## 2022-08-01 PROCEDURE — 84439 ASSAY OF FREE THYROXINE: CPT

## 2022-08-01 PROCEDURE — 82306 VITAMIN D 25 HYDROXY: CPT

## 2022-08-01 PROCEDURE — 85025 COMPLETE CBC W/AUTO DIFF WBC: CPT

## 2022-08-01 NOTE — TELEPHONE ENCOUNTER
----- Message from Hieu Carlson MD sent at 8/1/2022  8:00 AM EDT -----  Please call the patient regarding his abnormal result. Echo: Moderately reduced left ventricular systolic function with a visually estimated EF of 40 - 45%. Global hypokinesis present. Pulmonary hypertension not present. There is evidence of epicardial fat.  Mild stenosis of the aortic valve

## 2022-08-01 NOTE — TELEPHONE ENCOUNTER
Contacted pt and pt's wife at [facility]. Two patient Identifiers confirmed. Advised pt per Dr Kyle Coffman on abnormal echo results. Rescheduled follow up appt per pt request. Pt verbalized understanding.

## 2022-08-09 ENCOUNTER — TELEPHONE (OUTPATIENT)
Dept: CARDIOLOGY CLINIC | Age: 87
End: 2022-08-09

## 2022-08-09 NOTE — TELEPHONE ENCOUNTER
Patient is currently at [facility] and was on a ventilator for covid. Wife needed to speak to nurse because she needed a second opinion. Concerned because she doesn't know who the doctor is. Cardiologist coming to see Mr. Bliss at noon

## 2022-08-09 NOTE — TELEPHONE ENCOUNTER
Attempted to contact pt at  number, no answer. Lvm for pt to return call to office at [phone]. Will continue to try to contact pt.

## 2022-08-19 NOTE — TELEPHONE ENCOUNTER
Noted.  Patient received BCG therapy in 2011.  Status post bilateral nephrostomy tube placement on August 18, 2022.  Patient will need Oncology evaluation soon in needs further evaluation of lesion in the liver which could be metastatic disease.  Defer to Dr. Gaston.   Pt's wife called and had a question about his Metoprolol, and said he was having issues with BP. She requested a call back from clinical staff as soon as possible.

## 2022-08-30 ENCOUNTER — OFFICE VISIT (OUTPATIENT)
Dept: CARDIOLOGY CLINIC | Age: 87
End: 2022-08-30
Payer: MEDICARE

## 2022-08-30 VITALS
TEMPERATURE: 97.9 F | WEIGHT: 219 LBS | OXYGEN SATURATION: 96 % | RESPIRATION RATE: 18 BRPM | SYSTOLIC BLOOD PRESSURE: 116 MMHG | BODY MASS INDEX: 28.89 KG/M2 | HEART RATE: 64 BPM | DIASTOLIC BLOOD PRESSURE: 57 MMHG

## 2022-08-30 DIAGNOSIS — E78.00 PURE HYPERCHOLESTEROLEMIA: ICD-10-CM

## 2022-08-30 DIAGNOSIS — I25.83 CORONARY ARTERY DISEASE DUE TO LIPID RICH PLAQUE: ICD-10-CM

## 2022-08-30 DIAGNOSIS — I25.10 CORONARY ARTERY DISEASE DUE TO LIPID RICH PLAQUE: ICD-10-CM

## 2022-08-30 DIAGNOSIS — I48.0 PAF (PAROXYSMAL ATRIAL FIBRILLATION) (HCC): ICD-10-CM

## 2022-08-30 DIAGNOSIS — I10 ESSENTIAL HYPERTENSION WITH GOAL BLOOD PRESSURE LESS THAN 140/90: Primary | ICD-10-CM

## 2022-08-30 DIAGNOSIS — I25.5 ISCHEMIC CARDIOMYOPATHY: ICD-10-CM

## 2022-08-30 PROCEDURE — G8536 NO DOC ELDER MAL SCRN: HCPCS | Performed by: INTERNAL MEDICINE

## 2022-08-30 PROCEDURE — 1123F ACP DISCUSS/DSCN MKR DOCD: CPT | Performed by: INTERNAL MEDICINE

## 2022-08-30 PROCEDURE — G8417 CALC BMI ABV UP PARAM F/U: HCPCS | Performed by: INTERNAL MEDICINE

## 2022-08-30 PROCEDURE — G8428 CUR MEDS NOT DOCUMENT: HCPCS | Performed by: INTERNAL MEDICINE

## 2022-08-30 PROCEDURE — 99214 OFFICE O/P EST MOD 30 MIN: CPT | Performed by: INTERNAL MEDICINE

## 2022-08-30 PROCEDURE — 1101F PT FALLS ASSESS-DOCD LE1/YR: CPT | Performed by: INTERNAL MEDICINE

## 2022-08-30 PROCEDURE — G8432 DEP SCR NOT DOC, RNG: HCPCS | Performed by: INTERNAL MEDICINE

## 2022-08-30 RX ORDER — METOPROLOL TARTRATE 25 MG/1
TABLET, FILM COATED ORAL 3 TIMES DAILY
COMMUNITY
Start: 2022-08-16 | End: 2022-09-30 | Stop reason: ALTCHOICE

## 2022-08-30 RX ORDER — ROSUVASTATIN CALCIUM 20 MG/1
1 TABLET, COATED ORAL
COMMUNITY
Start: 2022-07-24

## 2022-08-30 RX ORDER — FUROSEMIDE 20 MG/1
40 TABLET ORAL DAILY
COMMUNITY

## 2022-08-30 RX ORDER — AMLODIPINE BESYLATE 10 MG/1
10 TABLET ORAL DAILY
COMMUNITY
End: 2022-09-30 | Stop reason: ALTCHOICE

## 2022-08-30 NOTE — PROGRESS NOTES
[facility]    Mr. Melissa Suazo is 80 y.o. 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 Patient was admitted to the hospital 3 weeks ago after having COVID-pneumonia and septic shock. Patient was intubated briefly. Eventually patient was discharged home. Patient states that he is slowly recovering. He has no coughing. No denies any chest pain or chest tightness. Denies any palpitation. Taking medication as prescribed. No chest pain    Past Medical History:   Diagnosis Date    A-fib ([address]     S/P Cardioversion X 2 (2018. 2019)    AAA (abdominal aortic aneurysm) ([facility] 2013    S/P Endovascular graft repair    Amiodarone pulmonary toxicity 2019    CAD (coronary artery disease)     S/P m RCA 4.0 X 15 mm USHA (03/22)    Cardiomyopathy ([facility] [address]     LVEF 35(11/20) 25% (07/20) 30% (04/20)    COPD (chronic obstructive pulmonary disease) (HCC)     GERD (gastroesophageal reflux disease)     HTN (hypertension)     Sleep apnea     CPAP         Past Surgical History:   Procedure Laterality Date    HX CATARACT REMOVAL Bilateral     HX CERVICAL FUSION      HX KNEE REPLACEMENT Right     NEUROLOGICAL PROCEDURE UNLISTED      NH CARDIAC SURG PROCEDURE UNLIST      ENDOGRAFT AORTA    [facility] CARDIOVERSION ELECTIVE ARRHYTHMIA EXTERNAL N/A 7/20/2020    EP CARDIOVERSION performed by Lisbeth Tompkins MD at [facility] CATH LAB    VASCULAR SURGERY PROCEDURE UNLIST      STENT RENAL ARTERY       Current Outpatient Medications   Medication Sig    rosuvastatin (CRESTOR) 20 mg tablet Take 1 Tablet by mouth nightly. apixaban (Eliquis) 2.5 mg tablet Take 2.5 mg by mouth two (2) times a day. furosemide (Lasix) 20 mg tablet Take  by mouth two (2) times a day. digoxin (LANOXIN) 0.125 mg tablet Take 1 Tablet by mouth daily. sacubitriL-valsartan (Entresto) 24-26 mg tablet Take 1 Tablet by mouth two (2) times a day. clopidogreL (PLAVIX) 75 mg tab Take  by mouth.    metoprolol tartrate (LOPRESSOR) 25 mg tablet three (3) times daily. budesonide (PULMICORT) 0.5 mg/2 mL nbsp 500 mcg by Nebulization route. budesonide-formoteroL (Symbicort) 160-4.5 mcg/actuation HFAA Take 2 Puffs by inhalation. magnesium oxide (MAG-OX) 400 mg tablet Take 400 mg by mouth daily. multivitamin (ONE A DAY) tablet Take 1 Tab by mouth daily. cholecalciferol, vitamin D3, (VITAMIN D3 PO) Take  by mouth.    melatonin 5 mg tablet Take  by mouth nightly as needed. formoterol fumarate (PERFOROMIST IN) Take  by inhalation. revefenacin (Yupelri) 175 mcg/3 mL nebu nebulizer solution 175 mcg by Nebulization route daily. co-enzyme Q-10 (CoQ-10) 100 mg capsule Take 200 mg by mouth daily. rOPINIRole (REQUIP) 1 mg tablet TAKE 1 TABLET BY MOUTH EVERY DAY     No current facility-administered medications for this visit. Allergies and Sensitivities:  Allergies   Allergen Reactions    Amiodarone Other (comments)    Statins-Hmg-Coa Reductase Inhibitors Other (comments)     myalgia       Family History:  No family history on file. Social History:  Social History     Tobacco Use    Smoking status: Former    Smokeless tobacco: Never   Substance Use Topics    Alcohol use: Yes     Alcohol/week: 3.0 standard drinks     Types: 3 Glasses of wine per week    Drug use: Never     He  reports that he has quit smoking. He has never used smokeless tobacco.  He  reports current alcohol use of about 3.0 standard drinks per week. Review of Systems:  Cardiac symptoms as noted above in HPI. All others negative.     Physical Exam:  BP Readings from Last 3 Encounters:   08/30/22 (!) 116/57   07/28/22 139/70   04/28/22 118/82         Pulse Readings from Last 3 Encounters:   08/30/22 64   04/28/22 88   03/25/22 78          Wt Readings from Last 3 Encounters:   08/30/22 99.3 kg (219 lb)   07/28/22 103.4 kg (228 lb)   04/28/22 103.5 kg (228 lb 3.2 oz)       Constitutional: Oriented to person, place, and time. HENT: Head: Normocephalic and atraumatic. Neck: No JVD present. Carotid bruit is not appreciated. Cardiovascular: Irregular rhythm. No murmur, gallop or rubs appreciated  Lung: Breath sounds normal. No respiratory distress. No ronchi or rales appreciated  Abdominal: No tenderness. No rebound and no guarding. Musculoskeletal: There is no ankle edema. No cynosis    Review of Data  LABS:   Lab Results   Component Value Date/Time    Sodium 138 03/16/2022 10:56 AM    Potassium 4.4 03/16/2022 10:56 AM    Chloride 104 03/16/2022 10:56 AM    CO2 30 03/16/2022 10:56 AM    Glucose 99 03/16/2022 10:56 AM    BUN 28 (H) 03/16/2022 10:56 AM    Creatinine 1.72 (H) 03/16/2022 10:56 AM     No flowsheet data found. Lab Results   Component Value Date/Time    ALT (SGPT) 53 06/08/2020 09:39 AM     No results found for: HBA1C, JPI8REMW, CGD9MYQC, FBH3EDXA    EKG  Atrial fibrillation with rapid ventricular response at heart rate of 120 bpm.  Left bundle branch block    ECHO (07/20)  Left Ventricle  Normal cavity size. Mild concentric hypertrophy. Moderate-to-severe systolic dysfunction. The estimated ejection fraction is 20 - 25%. Visually measured ejection fraction. LV wall motion is noted to be hypokinetic. Atrial fibrillation observed. Wall Scoring  The left ventricular wall motion is globally hypokinetic. ECHO (11/20)  Left Ventricle  Normal wall thickness. Mildly dilated left ventricle. The calculated EF is 35%. Visually measured ejection fraction. Moderate-to-severely reduced systolic function. Unable to assess diastolic function. Wall Scoring  The left ventricular wall motion is globally hypokinetic. Right Ventricle  Normal cavity size. Borderline low systolic function. Aortic Valve  Trileaflet valve structure. There is leaflet calcification. There is mild aortic stenosis. Trace aortic valve regurgitation. Mitral Valve  No stenosis. Mitral valve non-specific thickening. Mild mitral annular calcification. Trace regurgitation. Tricuspid Valve  Normal valve structure and no stenosis. Mild regurgitation. Pulmonic Valve  Normal valve structure and no stenosis. Trace regurgitation. Pulmonary Artery  Pulmonary arterial systolic pressure (PASP) is 40 mmHg. Pulmonary hypertension found to be mild. 03/25/22    CARDIAC PROCEDURE 03/25/2022 3/25/2022  Conclusion  LM: Normal  LAD: Mid 40% narrowing after origin of bifurcating diagonal branch. Diagonal branch: Ostial 80% stenosis with early bifurcation into superior inferior branch without any other obstructive disease medical management for diagonal branch disease  LCx/OM: Normal  RCA: Mid 90% tubular stenosis  Status post PCI and stenting of mid RCA 90% stenosis using 4.0 x 15 mm USHA    IMPRESSION & PLAN:  Mr. Angela Manley is 80 y.o. with multiple medical problem    CAD:  Cardiac catheterization in 03/2022 with 90% mid RCA stenosis requiring 4.0 x 15 mm USHA  Mild-moderate LAD and 80% ostial diagonal disease being managed medically  Continue Plavix and Eliquis  Currently on metoprolol 25 mg 3 times a day he has been intolerant to 4 different statins in the past.  Has decided against PCSK9 inhibitor    Cardiomyopathy:  LVEF 30% by echo in April 2020. Most likely from A. fib and tachycardia. LVEF 25% in July 2020. Underwent cardioversion for atrial fibrillation successfully in July 2020  LVEF 35% in November 2020  LVEF 25-30% by echo in 02/2022  LVEF 30-35% by echo in 08/2022  Currently on Toprol and Entresto. Using Lasix 20 mg twice daily. No evidence of fluid overload    Atrial fibrillation:  Paroxysmal in nature according to patient. Initially diagnosed in 2018. Had cardioversion successfully in 2018 and again in 2019.   Status post successful cardioversion again in July 2020  Back in a.fib in 11/2021. Has not been able to use amiodarone in the past because of significant side effects and pulmonary toxicity. Has seen electrophysiology colleague in July 2020. Suboptimal candidate for pulmonary vein isolation or antiarrhythmic medication. Rate control strategy has been recommended  Heart rate 88 bpm today. Continue beta-blocker and digoxin for now  If difficult to control heart rate, , he will need to consider pacemaker/AICD and ?? AV node ablation for rate control     HLD:  Intolerance to statin with leg cramps and itching and skin rash. Tried 4 different statins in past.  Has decided against PCSK9 inhibitor in the past    Hypertension: Currently on Toprol and entresto. BP stable    COPD, emphysema: Defer to Dr. Pastora Montanez. This plan was discussed with patient who is in agreement. Thank you for allowing me to participate in patient care. Please feel free to call me if you have any question or concern. Melina Gasca MD  Please note: This document has been produced using voice recognition software. Unrecognized errors in transcription may be present.

## 2022-08-30 NOTE — LETTER
8/30/2022    Patient: Teresa Bach. YOB: 1931   Date of Visit: 8/30/2022     Tarun Alexandra MD  [address]  [facility] [address] [facility] [address]  Via Fax: [phone]    Dear Tarun Alexandra MD,      Thank you for referring Mr. Tim Lamas to [address] [place] [address] for evaluation. My notes for this consultation are attached. If you have questions, please do not hesitate to call me. I look forward to following your patient along with you.       Sincerely,    Hieu Carlson MD

## 2022-08-30 NOTE — PROGRESS NOTES
Identified pt with two pt identifiers(name and ). Reviewed record in preparation for visit and have obtained necessary documentation. Black Damon. presents today for   Chief Complaint   Patient presents with    Hospital Follow Up       Pt C/O  SOB. Joel Colindres. preferred language for health care discussion is english/other. Personal Protective Equipment:   Personal Protective Equipment was used including: mask-surgical and hands-gloves. Patient was placed on no precaution(s). Patient was masked. Precautions:   Patient currently on None  Patient currently roomed with door closed. Is someone accompanying this pt? wife    Is the patient using any DME equipment during [address]? no    Depression Screening:  3 most recent PHQ Screens 2022   Little interest or pleasure in doing things Not at all   Feeling down, depressed, irritable, or hopeless Not at all   Total Score PHQ 2 0       Learning Assessment:  Learning Assessment 2020   PRIMARY LEARNER Patient   PRIMARY LANGUAGE ENGLISH   LEARNER PREFERENCE PRIMARY DEMONSTRATION   ANSWERED BY patient   RELATIONSHIP SELF       Abuse Screening:  Abuse Screening Questionnaire 2020   Do you ever feel afraid of your partner? N   Are you in a relationship with someone who physically or mentally threatens you? N   Is it safe for you to go home? Y       Fall Risk  Fall Risk Assessment, last 12 mths 2022   Able to walk? Yes   Fall in past 12 months? -   Do you feel unsteady? -   Are you worried about falling -   Number of falls in past 12 months -   Fall with injury? -       Pt currently taking Anticoagulant therapy? yes  Pt currently taking Antiplatelet therapy? yes    Coordination of Care:  1. Have you been to the ER, urgent care clinic since your last visit? Hospitalized since your last visit? Yes; [facility]    2. Have you seen or consulted any other health care providers outside of the [address] [place] [address] since your last visit? Include any pap smears or colon screening. no      Please see Red banners under Allergies and Med Rec to remove outside inquires. All correct information has been verified with patient and added to chart.      Medication's patient's would liked removed has been marked not taking to be removed per Verbal order and read back per Ioana Sky MD

## 2022-09-15 ENCOUNTER — TELEPHONE (OUTPATIENT)
Dept: CARDIOLOGY CLINIC | Age: 87
End: 2022-09-15

## 2022-09-15 NOTE — TELEPHONE ENCOUNTER
Incoming from pts wife. Two patient Identifiers confirmed. Advised pt has a rough night with sob and PT was there and adving of a fast heart rate. PT Nagel he was there with pt now and his Bp was 132/64 R 20 HR between 150-160 and c/o sob since his visit started. He stated pt did take a lopressor which decreased his HR to 136 R18 about 20 min ago but now his HR is elevated back to 140-145 and still had c/o sob. Pt was seen at pulmonologist yesterday and received 3 treatments due to sob per PT. Advised due to pts cardiac and pulmonary issue and current s/s of sob and tachycardia we could advise ER. HHN stated verbalized understanding.

## 2022-09-22 ENCOUNTER — TELEPHONE (OUTPATIENT)
Dept: CARDIOLOGY CLINIC | Age: 87
End: 2022-09-22

## 2022-09-22 NOTE — TELEPHONE ENCOUNTER
Contacted pts wife at [facility]. Two patient Identifiers confirmed. Advised pt carelinks are handled at HBV office but the results would be reviewed and sent to provider to review. Pt verbalized understanding.

## 2022-09-22 NOTE — TELEPHONE ENCOUNTER
Abdon Foot [facility] [phone]    Patient is currently at [facility] and got his device implanted yesterday and is being discharged today . Rep has questions regarding the new device.  Follow up questions etc.

## 2022-09-22 NOTE — TELEPHONE ENCOUNTER
Ирина Lovell at number. Two patient Identifiers confirmed. Advised she was told that we are handling pacers in this office. Informed pacer clinic is done through our HBV office by JOSH Roland RN only interrogations are handled in this office. She stated she was under the impression by Katie MEMBRENO that it was handled here but she inform her of the correct office. No other issues noted.

## 2022-09-30 ENCOUNTER — OFFICE VISIT (OUTPATIENT)
Dept: CARDIOLOGY CLINIC | Age: 87
End: 2022-09-30
Payer: MEDICARE

## 2022-09-30 VITALS
BODY MASS INDEX: 28.89 KG/M2 | WEIGHT: 219 LBS | OXYGEN SATURATION: 96 % | HEART RATE: 81 BPM | DIASTOLIC BLOOD PRESSURE: 58 MMHG | SYSTOLIC BLOOD PRESSURE: 110 MMHG

## 2022-09-30 DIAGNOSIS — I10 ESSENTIAL HYPERTENSION WITH GOAL BLOOD PRESSURE LESS THAN 140/90: ICD-10-CM

## 2022-09-30 DIAGNOSIS — E78.00 PURE HYPERCHOLESTEROLEMIA: ICD-10-CM

## 2022-09-30 DIAGNOSIS — I25.83 CORONARY ARTERY DISEASE DUE TO LIPID RICH PLAQUE: Primary | ICD-10-CM

## 2022-09-30 DIAGNOSIS — I25.10 CORONARY ARTERY DISEASE DUE TO LIPID RICH PLAQUE: Primary | ICD-10-CM

## 2022-09-30 PROCEDURE — G8417 CALC BMI ABV UP PARAM F/U: HCPCS | Performed by: INTERNAL MEDICINE

## 2022-09-30 PROCEDURE — 1123F ACP DISCUSS/DSCN MKR DOCD: CPT | Performed by: INTERNAL MEDICINE

## 2022-09-30 PROCEDURE — G8427 DOCREV CUR MEDS BY ELIG CLIN: HCPCS | Performed by: INTERNAL MEDICINE

## 2022-09-30 PROCEDURE — G8536 NO DOC ELDER MAL SCRN: HCPCS | Performed by: INTERNAL MEDICINE

## 2022-09-30 PROCEDURE — 1101F PT FALLS ASSESS-DOCD LE1/YR: CPT | Performed by: INTERNAL MEDICINE

## 2022-09-30 PROCEDURE — 99214 OFFICE O/P EST MOD 30 MIN: CPT | Performed by: INTERNAL MEDICINE

## 2022-09-30 PROCEDURE — G8510 SCR DEP NEG, NO PLAN REQD: HCPCS | Performed by: INTERNAL MEDICINE

## 2022-09-30 NOTE — LETTER
9/30/2022    Patient: Julian Santos. YOB: 1931   Date of Visit: 9/30/2022     Mary Alonzo MD  [address] [place] [address].  [facility] [address] [place] [id number]  Via Fax: [phone]    Dear Mary Alonzo MD,      Thank you for referring Mr. Sandrine Jorgensen to [address] [place] [address] for evaluation. My notes for this consultation are attached. If you have questions, please do not hesitate to call me. I look forward to following your patient along with you.       Sincerely,    Alana Roy MD

## 2022-09-30 NOTE — PROGRESS NOTES
[facility]    Mr. Otto Strong is 80 y.o. 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 Patient was admitted to [facility] with atrial fibrillation with RVR recently. It was difficult to control patient's heart rate. Eventually patient underwent AV tae ablation and BiV pacemaker placement. His beta-blocker was discontinued at the time. Since then he has been feeling slightly better. He feels like he still has fatigue and tiredness but it may be slightly better than before hospitalization. Denies any chest pain or chest tightness    Past Medical History:   Diagnosis Date    A-fib ([address]     S/P Cardioversion X 2 (2018. 2019)    AAA (abdominal aortic aneurysm) ([facility] 2013    S/P Endovascular graft repair    Amiodarone pulmonary toxicity 2019    CAD (coronary artery disease)     S/P m RCA 4.0 X 15 mm USHA (03/22)    Cardiomyopathy ([facility]     LVEF 35(11/20) 25% (07/20) 30% (04/20)    COPD (chronic obstructive pulmonary disease) (HCC)     GERD (gastroesophageal reflux disease)     HTN (hypertension)     Sleep apnea     CPAP         Past Surgical History:   Procedure Laterality Date    HX CATARACT REMOVAL Bilateral     HX CERVICAL FUSION      HX KNEE REPLACEMENT Right     NEUROLOGICAL PROCEDURE UNLISTED      [place] CARDIAC SURG PROCEDURE UNLIST      ENDOGRAFT AORTA    [place] CARDIOVERSION ELECTIVE ARRHYTHMIA EXTERNAL N/A 7/20/2020    EP CARDIOVERSION performed by Tenzin Du MD at [facility] CATH LAB    VASCULAR SURGERY PROCEDURE UNLIST      STENT RENAL ARTERY       Current Outpatient Medications   Medication Sig    rosuvastatin (CRESTOR) 20 mg tablet Take 1 Tablet by mouth nightly.     apixaban (ELIQUIS) 2.5 mg tablet Take 2.5 mg by mouth two (2) times a day. sacubitriL-valsartan (Entresto) 24-26 mg tablet Take 1 Tablet by mouth two (2) times a day. clopidogreL (PLAVIX) 75 mg tab Take  by mouth.    budesonide (PULMICORT) 0.5 mg/2 mL nbsp 500 mcg by Nebulization route. budesonide-formoteroL (SYMBICORT) 160-4.5 mcg/actuation HFAA Take 2 Puffs by inhalation. magnesium oxide (MAG-OX) 400 mg tablet Take 400 mg by mouth daily. multivitamin (ONE A DAY) tablet Take 1 Tab by mouth daily. cholecalciferol, vitamin D3, (VITAMIN D3 PO) Take  by mouth.    melatonin 5 mg tablet Take  by mouth nightly as needed. formoterol fumarate (PERFOROMIST IN) Take  by inhalation. revefenacin (Yupelri) 175 mcg/3 mL nebu nebulizer solution 175 mcg by Nebulization route daily. co-enzyme Q-10 (CO Q-10) 100 mg capsule Take 200 mg by mouth daily. rOPINIRole (REQUIP) 1 mg tablet TAKE 1 TABLET BY MOUTH EVERY DAY    furosemide (LASIX) 20 mg tablet Take 40 mg by mouth daily. No current facility-administered medications for this visit. Allergies and Sensitivities:  Allergies   Allergen Reactions    Amiodarone Other (comments)    Statins-Hmg-Coa Reductase Inhibitors Other (comments)     myalgia       Family History:  No family history on file. Social History:  Social History     Tobacco Use    Smoking status: Former    Smokeless tobacco: Never   Substance Use Topics    Alcohol use: Yes     Alcohol/week: 3.0 standard drinks     Types: 3 Glasses of wine per week    Drug use: Never     He  reports that he has quit smoking. He has never used smokeless tobacco.  He  reports current alcohol use of about 3.0 standard drinks per week. Review of Systems:  Cardiac symptoms as noted above in HPI. All others negative.     Physical Exam:  BP Readings from Last 3 Encounters:   09/30/22 (!) 110/58   08/30/22 (!) 116/57   07/28/22 139/70         Pulse Readings from Last 3 Encounters:   09/30/22 81   08/30/22 64   04/28/22 88          Wt Readings from Last 3 Encounters:   09/30/22 99.3 kg (219 lb)   08/30/22 99.3 kg (219 lb)   07/28/22 103.4 kg (228 lb)       Constitutional: Oriented to person, place, and time. HENT: Head: Normocephalic and atraumatic. Neck: No JVD present. Carotid bruit is not appreciated. Cardiovascular: Regular rhythm   no murmur, gallop or rubs appreciated  Lung: Breath sounds normal. No respiratory distress. No ronchi or rales appreciated  Abdominal: No tenderness. No rebound and no guarding. Musculoskeletal: There is no ankle edema. No cynosis    Review of Data  LABS:   Lab Results   Component Value Date/Time    Sodium 138 03/16/2022 10:56 AM    Potassium 4.4 03/16/2022 10:56 AM    Chloride 104 03/16/2022 10:56 AM    CO2 30 03/16/2022 10:56 AM    Glucose 99 03/16/2022 10:56 AM    BUN 28 (H) 03/16/2022 10:56 AM    Creatinine 1.72 (H) 03/16/2022 10:56 AM     No flowsheet data found. Lab Results   Component Value Date/Time    ALT (SGPT) 53 06/08/2020 09:39 AM     No results found for: HBA1C, WTW6OWNY, FRQ1NFTW, KVT3HLZZ    EKG  Atrial fibrillation with rapid ventricular response at heart rate of 120 bpm.  Left bundle branch block    ECHO (07/20)  Left Ventricle  Normal cavity size. Mild concentric hypertrophy. Moderate-to-severe systolic dysfunction. The estimated ejection fraction is 20 - 25%. Visually measured ejection fraction. LV wall motion is noted to be hypokinetic. Atrial fibrillation observed. Wall Scoring  The left ventricular wall motion is globally hypokinetic. ECHO (11/20)  Left Ventricle  Normal wall thickness. Mildly dilated left ventricle. The calculated EF is 35%. Visually measured ejection fraction. Moderate-to-severely reduced systolic function. Unable to assess diastolic function. Wall Scoring  The left ventricular wall motion is globally hypokinetic. Right Ventricle  Normal cavity size. Borderline low systolic function. Aortic Valve  Trileaflet valve structure.  There is leaflet calcification. There is mild aortic stenosis. Trace aortic valve regurgitation. Mitral Valve  No stenosis. Mitral valve non-specific thickening. Mild mitral annular calcification. Trace regurgitation. Tricuspid Valve  Normal valve structure and no stenosis. Mild regurgitation. Pulmonic Valve  Normal valve structure and no stenosis. Trace regurgitation. Pulmonary Artery  Pulmonary arterial systolic pressure (PASP) is 40 mmHg. Pulmonary hypertension found to be mild. 03/25/22    CARDIAC PROCEDURE 03/25/2022 3/25/2022  Conclusion  LM: Normal  LAD: Mid 40% narrowing after origin of bifurcating diagonal branch. Diagonal branch: Ostial 80% stenosis with early bifurcation into superior inferior branch without any other obstructive disease medical management for diagonal branch disease  LCx/OM: Normal  RCA: Mid 90% tubular stenosis  Status post PCI and stenting of mid RCA 90% stenosis using 4.0 x 15 mm USHA    IMPRESSION & PLAN:  Mr. Susy Matute is 80 y.o. with multiple medical problem    CAD:  Cardiac catheterization in 03/2022 with 90% mid RCA stenosis requiring 4.0 x 15 mm USHA  Mild-moderate LAD and 80% ostial diagonal disease being managed medically  Continue Plavix and Eliquis  Beta-blocker has been discontinued recently after having AV tae ablation and BiV CRTP in 09/2022   he has been intolerant to 4 different statins in the past.  Has decided against PCSK9 inhibitor    Cardiomyopathy:  LVEF 30% by echo in April 2020. Most likely from A. fib and tachycardia. LVEF 25% in July 2020. Underwent cardioversion for atrial fibrillation successfully in July 2020  LVEF 35% in November 2020  LVEF 25-30% by echo in 02/2022  LVEF 30-35% by echo in 08/2022  Echo in 09/2022 with LVEF 25%  S/p BiV CRT P and AV tae ablation in 09/2022  Was on Toprol and Entresto. After recent hospitalization Toprol was discontinued.   We will reevaluate this on next visit    Atrial fibrillation:  Paroxysmal in nature according to patient. Initially diagnosed in 2018. Had cardioversion successfully in 2018 and again in 2019. Status post successful cardioversion again in July 2020  Back in a.fib in 11/2021. Has not been able to use amiodarone in the past because of significant side effects and pulmonary toxicity. Has seen electrophysiology colleague in July 2020. Suboptimal candidate for pulmonary vein isolation or antiarrhythmic medication. Rate control strategy has been recommended  S/p BiV CRT P and AV tae ablation in 09/2022     Device check according to EP clinic protocol. HLD:  Intolerance to statin with leg cramps and itching and skin rash. Tried 4 different statins in past.  Has decided against PCSK9 inhibitor in the past.  Now trying Crestor again. Hypertension: Currently on Toprol and entresto. BP stable    COPD, emphysema: Defer to Dr. Ignacio Ramos. This plan was discussed with patient who is in agreement. Thank you for allowing me to participate in patient care. Please feel free to call me if you have any question or concern. Kassidy Ann MD  Please note: This document has been produced using voice recognition software. Unrecognized errors in transcription may be present.

## 2022-09-30 NOTE — PROGRESS NOTES
Identified pt with two pt identifiers(name and ). Reviewed record in preparation for visit and have obtained necessary documentation. Sixtolynndiamond Thompson. presents today for   Chief Complaint   Patient presents with    Follow-up       Pt denies DIZZINESS, SOB, CHEST PAIN/ PRESSURE, FATIGUE/WEAKNESS, HEADACHES, SWELLING. Joel Dooley. preferred language for health care discussion is english/other. Personal Protective Equipment:   Personal Protective Equipment was used including: mask-surgical and hands-gloves. Patient was placed on no precaution(s). Patient was masked. Precautions:   Patient currently on None  Patient currently roomed with door closed. Is someone accompanying this pt? Yes, wife    Is the patient using any DME equipment during [address]? no    Depression Screening:  3 most recent PHQ Screens 2022   Little interest or pleasure in doing things Not at all   Feeling down, depressed, irritable, or hopeless Not at all   Total Score PHQ 2 0       Learning Assessment:  Learning Assessment 2020   PRIMARY LEARNER Patient   PRIMARY LANGUAGE ENGLISH   LEARNER PREFERENCE PRIMARY DEMONSTRATION   ANSWERED BY patient   RELATIONSHIP SELF       Abuse Screening:  Abuse Screening Questionnaire 2020   Do you ever feel afraid of your partner? N   Are you in a relationship with someone who physically or mentally threatens you? N   Is it safe for you to go home? Y       Fall Risk  Fall Risk Assessment, last 12 mths 2022   Able to walk? Yes   Fall in past 12 months? -   Do you feel unsteady? -   Are you worried about falling -   Number of falls in past 12 months -   Fall with injury? -       Pt currently taking Anticoagulant therapy? yes  Pt currently taking Antiplatelet therapy? yes    Coordination of Care:  1. Have you been to the ER, urgent care clinic since your last visit? Hospitalized since your last visit? Yes, 9/15 for A fib    2.  Have you seen or consulted any other health care providers outside of the [address] [place] [address] since your last visit? Include any pap smears or colon screening. no      Please see Red banners under Allergies and Med Rec to remove outside inquires. All correct information has been verified with patient and added to chart.      Medication's patient's would liked removed has been marked not taking to be removed per Verbal order and read back per Lizette Ragland MD

## 2022-10-03 ENCOUNTER — TELEPHONE (OUTPATIENT)
Dept: CARDIOLOGY CLINIC | Age: 87
End: 2022-10-03

## 2022-10-03 NOTE — TELEPHONE ENCOUNTER
Contacted pts wife. Two patient Identifiers confirmed. Informed I would advised provider once in office. Pts wife verbalized understanding.

## 2022-10-03 NOTE — TELEPHONE ENCOUNTER
Patient is at [facility] for passing out over the weekend. Patient has not heard anything from the [address] in regards to this. Currently waiting for the cardiologist at [address]  Patient also says every other beat is very weak

## 2022-10-18 ENCOUNTER — TELEPHONE (OUTPATIENT)
Dept: SLEEP MEDICINE | Age: 87
End: 2022-10-18

## 2022-10-18 DIAGNOSIS — G47.33 OSA (OBSTRUCTIVE SLEEP APNEA): Primary | ICD-10-CM

## 2022-10-18 DIAGNOSIS — I48.91 ATRIAL FIBRILLATION, UNSPECIFIED TYPE (HCC): ICD-10-CM

## 2022-10-21 ENCOUNTER — HOSPITAL ENCOUNTER (OUTPATIENT)
Dept: SLEEP MEDICINE | Age: 87
Discharge: HOME | End: 2022-10-21
Attending: INTERNAL MEDICINE
Payer: MEDICARE

## 2022-10-22 VITALS
BODY MASS INDEX: 28.57 KG/M2 | SYSTOLIC BLOOD PRESSURE: 156 MMHG | HEART RATE: 43 BPM | WEIGHT: 215.6 LBS | HEIGHT: 73 IN | TEMPERATURE: 97.9 F | DIASTOLIC BLOOD PRESSURE: 80 MMHG

## 2022-10-22 PROCEDURE — 95811 POLYSOM 6/>YRS CPAP 4/> PARM: CPT

## 2022-10-22 NOTE — PROGRESS NOTES
Patient arrived for sleep study  Physician Orders were reviewed. Patient questions were answered. Patient education to include initiation of PAP therapy per protocol. Patient demonstrated good understanding of the positive airway pressure device.

## 2022-10-22 NOTE — PROGRESS NOTES
Sleep study completed per physician order. Patient discharged from sleep center. Patient wake, alert, and able to leave the facility under their own power. Instructed to follow up with their sleep physician for results.

## 2022-11-01 ENCOUNTER — DOCUMENTATION ONLY (OUTPATIENT)
Dept: PULMONOLOGY | Age: 87
End: 2022-11-01

## 2022-11-01 NOTE — PROGRESS NOTES
[address], [facility], [address] [facility],  [phone]     Polysomnography Report    Tayla Shahid. Patient: Joel Mendez Study Type: Split Night PSG   : 3/9/1931 Patient Details: Male, 89 years, Height 6' 1\",   MR#: [id number]   Weight 215.6 lbs, BMI 28.44   Physician: Paz Stallings DO Ref. Physician: Erick Ny MD   Recording Technician: TITUS Bethea, [facility] Recording Details: Recorded at 8:52:31 PM on 10/21/2022    Scoring Technician:   for 601 minutes. STUDY PROTOCOL: The study consisted of 14 channels, including left and right EOG and EEG, submental and extremity EMG, EKG, airflow, respiratory effort and oximetry measurement. The study report was generated by personal review of the raw data from the patient's sleep study. TECHNICAL: A Hypopnea was scored according to AASM guidelines. A hypopnea was scored when the nasal pressure signal dropped by 30% or greater from the pre-event baseline for greater than or equal to 10 seconds and was accompanied by at least a 3% or 4% drop in the SpO2 from pre-event baseline. For Medicare and/or Medicaid patients, hypopneas were scored with an associated 4% drop in SpO2 from pre-event baseline. SPLIT NIGHT CPAP REPORT  MEDICATIONS: Crestor, Eliquis, Lasix, Entresto, Plavix, Pulmicort, Mag-Ox, One A Day, vitamin D3, melatonin, Perforomist, Yupelri, Co Q 10, Requip    SUMMARY: The blood pressure readings: PRE   SLEEP BP: 180/57 POST SLEEP BP:  156/80. The sleep efficiency was 95%. During the night, 21 awakenings were recorded, with 19.0 minutes spent awake after sleep onset. The arousal index was 56.2 per hour. The sleep onset latency was 1.0 minutes; the stage 2 latency was 2.0 minutes. REM sleep was 14 percent of sleep time; slow wave sleep was 33 percent. Snoring was noted 70.8 percent of the sleep time for a count of 424.    DIAGNOSTIC PORTION:  The AHI was 72 per hour; the RDI was 93 per hour. The PLM index was 28, with 6 associated with arousal hourly, on average. The time with oxygen saturation below 88% was 4.2 minutes. The minimum oxygen saturation was 73%. The oxygen desaturation index was 55.9. The average respiratory event lasted 17 seconds. The longest event was 1 seconds. PAP PORTION:  The overall AHI was 15.6 per hour; the RDI was 31 per hour. The PLM index was 92, with 7.2 associated with arousal hourly, on average. The time with oxygen saturation below 88% was 2.8 minutes. The minimum oxygen saturation was 74%. The oxygen desaturation index was 8.2. The average respiratory event lasted 15 seconds. The longest event was 1 seconds. The respiratory rate was typically between 12 and 20 breaths per minute; Vickki Michel pattern was seen. Mouth breathing was frequently noted by recording tech. Melatonin, 5mg was reported as self-administered. The patient reported insufficient sleep the previous night (<7 hours). Sleep hygiene violation:  caffeine. No bathroom trips were taken. Patient with cardiac pacemaker. Unable to comment further about rhythm. No alpha intrusion, parasomnias or EEG abnormalities occurred. Supplemental oxygen was not used during this study. Summary:  Overall, the was a challenging study. The patient reported sleeping worse than their typical sleep pattern. The patient was diagnosed with severe, CSA during the diagnostic portion of this study. Some of the patient's central apneas included . The patient was then started on a CPAP titration. Central apneas persisted, but the  pattern appeared to have improved. The patient was then switched to a BIPAP titration. However, a therapeutic mode and pressure was not able to be determined. The periodic limb movement index was elevated. In some instances, this can suggest an underlying periodic limb movement disorder or other pain process such as radiculopathy.   Further clinical correlated of this later finding is recommended. <BR>PRESSURES USED DURING THE STUDY: 5, 7, 9, 11, 13, 14, 16, 14/10 cwp.     DIAGNOSIS: 1) Complex Sleep Apnea (CSA)  2) Cheyne Gonzalez Respirations ()  3) Pacemaker

## 2022-11-08 ENCOUNTER — TELEPHONE (OUTPATIENT)
Dept: CARDIOLOGY CLINIC | Age: 87
End: 2022-11-08

## 2022-11-08 NOTE — TELEPHONE ENCOUNTER
Contacted pt at [facility] number. Two patient Identifiers confirmed. Advised per Dr Ahmet Saucedo. Per Nilo Resendiz PCP is refusing to sign due to pt not using nursing in house. Pt has no wounds. Patient is currently has PT and was put on [facility].  Start of care Sept 29, 2022 due to gait and weakness and is currently scheduled to end 11/27/2022 but may be renewed  dependant on pts strengthening needed. Advise I would review with provider.

## 2022-11-08 NOTE — TELEPHONE ENCOUNTER
Homehealth called [facility] [phone]). To see if [facility] will sign for patient to be undercare of home health. They are asking for a call back.  Thank you

## 2022-11-08 NOTE — TELEPHONE ENCOUNTER
Contacted [facility] Two patient Identifiers confirmed. Advised pt per Dr Rider Nicely will sign order. She advised the his pacer was registering at 43 but was set to register at 80. She advised they informed pt to go to ER for evaluation. Will advise provider. No other issues noted.

## 2022-11-15 ENCOUNTER — OFFICE VISIT (OUTPATIENT)
Dept: CARDIOLOGY CLINIC | Age: 87
End: 2022-11-15
Payer: MEDICARE

## 2022-11-15 VITALS
SYSTOLIC BLOOD PRESSURE: 136 MMHG | BODY MASS INDEX: 29.03 KG/M2 | TEMPERATURE: 97.9 F | WEIGHT: 220 LBS | DIASTOLIC BLOOD PRESSURE: 69 MMHG | HEART RATE: 81 BPM | OXYGEN SATURATION: 99 %

## 2022-11-15 DIAGNOSIS — I25.10 CORONARY ARTERY DISEASE DUE TO LIPID RICH PLAQUE: Primary | ICD-10-CM

## 2022-11-15 DIAGNOSIS — I10 ESSENTIAL HYPERTENSION WITH GOAL BLOOD PRESSURE LESS THAN 140/90: ICD-10-CM

## 2022-11-15 DIAGNOSIS — I25.83 CORONARY ARTERY DISEASE DUE TO LIPID RICH PLAQUE: Primary | ICD-10-CM

## 2022-11-15 DIAGNOSIS — E78.00 PURE HYPERCHOLESTEROLEMIA: ICD-10-CM

## 2022-11-15 PROCEDURE — G8417 CALC BMI ABV UP PARAM F/U: HCPCS | Performed by: INTERNAL MEDICINE

## 2022-11-15 PROCEDURE — G8432 DEP SCR NOT DOC, RNG: HCPCS | Performed by: INTERNAL MEDICINE

## 2022-11-15 PROCEDURE — 99214 OFFICE O/P EST MOD 30 MIN: CPT | Performed by: INTERNAL MEDICINE

## 2022-11-15 PROCEDURE — G8536 NO DOC ELDER MAL SCRN: HCPCS | Performed by: INTERNAL MEDICINE

## 2022-11-15 PROCEDURE — 1101F PT FALLS ASSESS-DOCD LE1/YR: CPT | Performed by: INTERNAL MEDICINE

## 2022-11-15 PROCEDURE — G8428 CUR MEDS NOT DOCUMENT: HCPCS | Performed by: INTERNAL MEDICINE

## 2022-11-15 PROCEDURE — 1123F ACP DISCUSS/DSCN MKR DOCD: CPT | Performed by: INTERNAL MEDICINE

## 2022-11-15 RX ORDER — MEXILETINE HYDROCHLORIDE 200 MG/1
200 CAPSULE ORAL EVERY 12 HOURS
COMMUNITY
Start: 2022-11-11

## 2022-11-15 RX ORDER — METOPROLOL TARTRATE 25 MG/1
TABLET, FILM COATED ORAL 2 TIMES DAILY
COMMUNITY

## 2022-11-15 NOTE — PROGRESS NOTES
[facility]    Mr. Isabela Jean is 80 y.o. 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 Patient recently went to hospital with presyncopal event. During ER visit, felt like patient was in bigeminy. Patient was started on mexiletine however because of side effect patient has stopped taking the medication. Denies any chest pain or chest tightness. Feels fatigue and tiredness. No lower extremity swelling. Past Medical History:   Diagnosis Date    A-fib [facility])     S/P Cardioversion X 2 (2018. 2019)    AAA (abdominal aortic aneurysm) 2013    S/P Endovascular graft repair    Amiodarone pulmonary toxicity 2019    CAD (coronary artery disease)     S/P m RCA 4.0 X 15 mm USHA (03/22)    Cardiomyopathy ([address]     LVEF 35(11/20) 25% (07/20) 30% (04/20)    COPD (chronic obstructive pulmonary disease) (HCC)     GERD (gastroesophageal reflux disease)     HTN (hypertension)     Sleep apnea     CPAP         Past Surgical History:   Procedure Laterality Date    HX CATARACT REMOVAL Bilateral     HX CERVICAL FUSION      HX KNEE REPLACEMENT Right     NEUROLOGICAL PROCEDURE UNLISTED      WI CARDIAC SURG PROCEDURE UNLIST      ENDOGRAFT AORTA    WI CARDIOVERSION ELECTIVE ARRHYTHMIA EXTERNAL N/A 7/20/2020    EP CARDIOVERSION performed by Ryder Little MD at [address] [place] [address] CATH LAB    VASCULAR SURGERY PROCEDURE UNLIST      STENT RENAL ARTERY       Current Outpatient Medications   Medication Sig    metoprolol tartrate (LOPRESSOR) 25 mg tablet Take  by mouth two (2) times a day. mexiletine (MEXITIL) 200 mg capsule Take 200 mg by mouth every twelve (12) hours. rosuvastatin (CRESTOR) 20 mg tablet Take 1 Tablet by mouth nightly.     apixaban (ELIQUIS) 2.5 mg tablet Take 2.5 mg by mouth two (2) times a day. furosemide (LASIX) 20 mg tablet Take 40 mg by mouth daily. sacubitriL-valsartan (Entresto) 24-26 mg tablet Take 1 Tablet by mouth two (2) times a day. clopidogreL (PLAVIX) 75 mg tab Take  by mouth. co-enzyme Q-10 (CO Q-10) 100 mg capsule Take 200 mg by mouth daily. budesonide (PULMICORT) 0.5 mg/2 mL nbsp 500 mcg by Nebulization route. budesonide-formoteroL (SYMBICORT) 160-4.5 mcg/actuation HFAA Take 2 Puffs by inhalation. magnesium oxide (MAG-OX) 400 mg tablet Take 400 mg by mouth daily. multivitamin (ONE A DAY) tablet Take 1 Tab by mouth daily. cholecalciferol, vitamin D3, (VITAMIN D3 PO) Take  by mouth.    melatonin 5 mg tablet Take  by mouth nightly as needed. formoterol fumarate (PERFOROMIST IN) Take  by inhalation. revefenacin (Yupelri) 175 mcg/3 mL nebu nebulizer solution 175 mcg by Nebulization route daily. rOPINIRole (REQUIP) 1 mg tablet TAKE 1 TABLET BY MOUTH EVERY DAY     No current facility-administered medications for this visit. Allergies and Sensitivities:  Allergies   Allergen Reactions    Amiodarone Other (comments)    Statins-Hmg-Coa Reductase Inhibitors Other (comments)     myalgia       Family History:  No family history on file. Social History:  Social History     Tobacco Use    Smoking status: Former    Smokeless tobacco: Never   Substance Use Topics    Alcohol use: Yes     Alcohol/week: 3.0 standard drinks     Types: 3 Glasses of wine per week    Drug use: Never     He  reports that he has quit smoking. He has never used smokeless tobacco.  He  reports current alcohol use of about 3.0 standard drinks per week. Review of Systems:  Cardiac symptoms as noted above in HPI. All others negative.     Physical Exam:  BP Readings from Last 3 Encounters:   11/15/22 136/69   10/22/22 (!) 156/80   09/30/22 (!) 110/58         Pulse Readings from Last 3 Encounters:   11/15/22 81   10/21/22 (!) 43   09/30/22 81 Wt Readings from Last 3 Encounters:   11/15/22 99.8 kg (220 lb)   10/21/22 97.8 kg (215 lb 9.6 oz)   09/30/22 99.3 kg (219 lb)       Constitutional: Oriented to person, place, and time. HENT: Head: Normocephalic and atraumatic. Neck: No JVD present. Carotid bruit is not appreciated. Cardiovascular: Regular rhythm   no murmur, gallop or rubs appreciated  Lung: Breath sounds normal. No respiratory distress. No ronchi or rales appreciated  Abdominal: No tenderness. No rebound and no guarding. Musculoskeletal: There is no ankle edema. No cynosis    Review of Data  LABS:   Lab Results   Component Value Date/Time    Sodium 138 03/16/2022 10:56 AM    Potassium 4.4 03/16/2022 10:56 AM    Chloride 104 03/16/2022 10:56 AM    CO2 30 03/16/2022 10:56 AM    Glucose 99 03/16/2022 10:56 AM    BUN 28 (H) 03/16/2022 10:56 AM    Creatinine 1.72 (H) 03/16/2022 10:56 AM     No flowsheet data found. Lab Results   Component Value Date/Time    ALT (SGPT) 53 06/08/2020 09:39 AM     No results found for: HBA1C, JHA8LLVZ, BVZ2NUGF, HAZ7SMEO    EKG  Atrial fibrillation with rapid ventricular response at heart rate of 120 bpm.  Left bundle branch block    Stress Test (10/22)  CONCLUSIONS     * stress and rest myocardial perfusion study without evidence of inducible ischemia or scar. * ABNormal left ventricular function with calculated LVEF 44 %. * Stress SPECT tomographic images reveal homogeneous tracer uptake throughout the myocardium. Rest images show no significant change. * Patient did not have chest pain in response to stress. CARDIAC PROCEDURE 03/25/2022 3/25/2022  Conclusion  LM: Normal  LAD: Mid 40% narrowing after origin of bifurcating diagonal branch.   Diagonal branch: Ostial 80% stenosis with early bifurcation into superior inferior branch without any other obstructive disease medical management for diagonal branch disease  LCx/OM: Normal  RCA: Mid 90% tubular stenosis  Status post PCI and stenting of mid RCA 90% stenosis using 4.0 x 15 mm USHA    IMPRESSION & PLAN:  Mr. Ashwin Pittman is 80 y.o. with multiple medical problem    CAD:  Cardiac catheterization in 03/2022 with 90% mid RCA stenosis requiring 4.0 x 15 mm USHA  Mild-moderate LAD disease . And 80% ostial diagonal disease being managed medically  Continue Plavix and Eliquis. currently on metoprolol  Beta-blocker has been discontinued recently after having AV tae ablation and BiV CRTP in 09/2022   he has been intolerant to 4 different statins in the past.  Has decided against PCSK9 inhibitor    Cardiomyopathy:  LVEF 30% by echo in April 2020. Most likely from A. fib and tachycardia. LVEF 25% in July 2020. Underwent cardioversion for atrial fibrillation successfully in July 2020  LVEF 35% in November 2020  LVEF 25-30% by echo in 02/2022  LVEF 25% by echo in 09/2022    S/p BiV CRT P and AV tae ablation in 09/2022    [facility] frequent PVC however patient stopped it as it cause ? ? Side effect, hypotension  Now on metoprolol and Entresto. will have patient seen by electrophysiology colleague for help in management of BiV CRT and congestive heart failure. Atrial fibrillation:  Paroxysmal in nature according to patient. Initially diagnosed in 2018. Had cardioversion successfully in 2018 and again in 2019. Status post successful cardioversion again in July 2020  Back in a.fib in 11/2021. Has not been able to use amiodarone in the past because of significant side effects and pulmonary toxicity. Has seen electrophysiology colleague in July 2020. Suboptimal candidate for pulmonary vein isolation or antiarrhythmic medication. S/p BiV CRT P and AV tae ablation in 09/2022 at [address] [facility] [facility] check according to EP clinic protocol. HLD:  Intolerance to statin with leg cramps and itching and skin rash.  Tried 4 different statins in past.  Has decided against PCSK9 inhibitor in the past.  Now trying Crestor again.    Hypertension: Currently on metoprolol and Entresto. Blood pressure normal today    COPD, emphysema: Defer to Dr. Roshni Andrews. This plan was discussed with patient who is in agreement. Thank you for allowing me to participate in patient care. Please feel free to call me if you have any question or concern. Prabha Mckeon MD  Please note: This document has been produced using voice recognition software. Unrecognized errors in transcription may be present.

## 2022-11-15 NOTE — PROGRESS NOTES
Identified pt with two pt identifiers(name and ). Reviewed record in preparation for visit and have obtained necessary documentation. Alyssa Roa. presents today for   Chief Complaint   Patient presents with    Hospital Follow Up       Pt denied  DIZZINESS, SOB, CHEST PAIN/ PRESSURE, FATIGUE/WEAKNESS, HEADACHES, SWELLING. Joel Alexander. preferred language for health care discussion is english/other. Personal Protective Equipment:   Personal Protective Equipment was used including: mask-surgical and hands-gloves. Patient was placed on no precaution(s). Patient was masked. Precautions:   Patient currently on None  Patient currently roomed with door closed. Is someone accompanying this pt? no    Is the patient using any DME equipment during [address]? cane    Depression Screening:  3 most recent PHQ Screens 11/15/2022   Little interest or pleasure in doing things Not at all   Feeling down, depressed, irritable, or hopeless Not at all   Total Score PHQ 2 0       Learning Assessment:  Learning Assessment 2020   PRIMARY LEARNER Patient   PRIMARY LANGUAGE ENGLISH   LEARNER PREFERENCE PRIMARY DEMONSTRATION   ANSWERED BY patient   RELATIONSHIP SELF       Abuse Screening:  Abuse Screening Questionnaire 2020   Do you ever feel afraid of your partner? N   Are you in a relationship with someone who physically or mentally threatens you? N   Is it safe for you to go home? Y       Fall Risk  Fall Risk Assessment, last 12 mths 2022   Able to walk? Yes   Fall in past 12 months? -   Do you feel unsteady? -   Are you worried about falling -   Number of falls in past 12 months -   Fall with injury? -       Pt currently taking Anticoagulant therapy? no  Pt currently taking Antiplatelet therapy? yes    Coordination of Care:  1. Have you been to the ER, urgent care clinic since your last visit? Hospitalized since your last visit? [facility]    2.  Have you seen or consulted any other health care providers outside of the [address] [place] [address] since your last visit? Include any pap smears or colon screening. no      Please see Red banners under Allergies and Med Rec to remove outside inquires. All correct information has been verified with patient and added to chart.      Medication's patient's would liked removed has been marked not taking to be removed per Verbal order and read back per Merlin Frees, MD

## 2022-11-15 NOTE — LETTER
11/15/2022    Patient: Shweta Li. YOB: 1931   Date of Visit: 11/15/2022     Gabo Dodd MD  [address]  [facility] [address] [place] [id number]  Via Fax: [phone]    Dear Gabo Dodd MD,      Thank you for referring Mr. Maira Peter to [address] [place] [address] for evaluation. My notes for this consultation are attached. If you have questions, please do not hesitate to call me. I look forward to following your patient along with you.       Sincerely,    Manuel Nelson MD

## 2022-11-28 ENCOUNTER — TELEPHONE (OUTPATIENT)
Dept: CARDIOLOGY CLINIC | Age: 87
End: 2022-11-28

## 2022-11-28 NOTE — TELEPHONE ENCOUNTER
Patient spouse called because she wanted to verify that jonny foy wants patient to still see jonny sanders due to health issues. Spouse has questions about patients health.  Thank you

## 2022-11-28 NOTE — TELEPHONE ENCOUNTER
put pt on Dr. Christo Villegas wait list. Will advise pt and contact to discuss health questions with spouse. Will advise.

## 2022-11-29 NOTE — TELEPHONE ENCOUNTER
Contacted pt's wife at [facility]. Two patient Identifiers confirmed. Advised spouse that pt was put on wait list to see Dr. Racquel Farias. Advised that someone will call if anything opens up. Spouse also stated that pt had an episode of SOB this AM before pt got out of bed. Stated that he \"was gasping for breath\", but resolved after a short time. Spouse denied pt experienced any chest pain. Advised that pt's spouse call Dr. Anahi Lee office for further guidance. Has appt with Dr. Momo Salas on 12/9. Will monitor for any messages from  regarding appt with Dr. Racquel Farias. Pt verbalized understanding.

## 2022-11-30 ENCOUNTER — TELEPHONE (OUTPATIENT)
Dept: CARDIOLOGY CLINIC | Age: 87
End: 2022-11-30

## 2022-11-30 NOTE — TELEPHONE ENCOUNTER
Patient wife calling very concerned about he husbands condition. States that he took all his meds as usual but is very lethargic. Sweating, slurred speech, and can barely hold his cup of coffee. Put patient on brief hold and relayed this info to the nurse.  Transferred wife to speak to the nurse

## 2022-11-30 NOTE — TELEPHONE ENCOUNTER
Incoming from pts wife. Two patient Identifiers confirmed. Advised pt was \"lethargic this am\" and had \"shallow breathing. \" Inquired if she could get vitals on pt. She stated she would not be able top get BP put attempted to get pulse and 02 but it would not register. Advised pts wife to take pt to ER. Pt verbalized understanding.

## 2022-12-05 ENCOUNTER — TELEPHONE (OUTPATIENT)
Dept: CARDIOLOGY CLINIC | Age: 87
End: 2022-12-05

## 2022-12-05 NOTE — TELEPHONE ENCOUNTER
Patient had an appt with Yeyo Alexander on Friday. Was taken of BP meds and lasix. He is currently taking entresto to keep his BP down. 179/98 was most recent reading. The metoprolol and entresto together is was causes it to drop so low. Patient would like tot speak to a nurse in the meantime before his appt. This is the second time this has happened. Yeyo Alexander says that he needs Young's input on what to do regarding his BP.  Has been advised to take BP four times a day and they have seen an uptick in the BP

## 2022-12-06 ENCOUNTER — HOSPITAL ENCOUNTER (OUTPATIENT)
Dept: LAB | Age: 87
Discharge: HOME | End: 2022-12-06
Payer: MEDICARE

## 2022-12-06 LAB
ANION GAP SERPL CALC-SCNC: 7 MMOL/L (ref 3–18)
BUN SERPL-MCNC: 19 MG/DL (ref 7–18)
BUN/CREAT SERPL: 13 (ref 12–20)
CALCIUM SERPL-MCNC: 9.2 MG/DL (ref 8.5–10.1)
CHLORIDE SERPL-SCNC: 106 MMOL/L (ref 100–111)
CO2 SERPL-SCNC: 29 MMOL/L (ref 21–32)
CREAT SERPL-MCNC: 1.49 MG/DL (ref 0.6–1.3)
GLUCOSE SERPL-MCNC: 95 MG/DL (ref 74–99)
POTASSIUM SERPL-SCNC: 4.1 MMOL/L (ref 3.5–5.5)
SODIUM SERPL-SCNC: 142 MMOL/L (ref 136–145)

## 2022-12-06 PROCEDURE — 80048 BASIC METABOLIC PNL TOTAL CA: CPT

## 2022-12-06 PROCEDURE — 36415 COLL VENOUS BLD VENIPUNCTURE: CPT

## 2022-12-06 NOTE — TELEPHONE ENCOUNTER
Contacted pt at requested #. Two patient Identifiers confirmed. Called to advise PT to space his Lopressor and Collpantera Bellis out. PT wife advised that he has not really been taking lopressor only when his BP is elevated. PT has rarely been taking an Entresto as well only if BP becomes elevated. PT wife states he is a total different human and mobile and doing things on his own and for himself. PT wife stated if an earlier appt comes available then they will take it if not they will come in at scheduled appt for 12/29. PT wife will call back if something changes. Advised to call the office if further problems.

## 2022-12-06 NOTE — TELEPHONE ENCOUNTER
Current Outpatient Medications   Medication Instructions    apixaban (ELIQUIS) 2.5 mg, Oral, 2 TIMES DAILY    budesonide (PULMICORT) 500 mcg, Nebulization    budesonide-formoteroL (SYMBICORT) 160-4.5 mcg/actuation HFAA 2 Puffs, Inhalation    cholecalciferol, vitamin D3, (VITAMIN D3 PO) Oral    clopidogreL (PLAVIX) 75 mg tab Oral    co-enzyme Q-10 (CO Q-10) 200 mg, Oral, DAILY    formoterol fumarate (PERFOROMIST IN) Inhalation    furosemide (LASIX) 40 mg, Oral, DAILY    magnesium oxide (MAG-OX) 400 mg, Oral, DAILY    melatonin 5 mg tablet Oral, BEDTIME PRN    metoprolol tartrate (LOPRESSOR) 25 mg tablet Oral, 2 TIMES DAILY    mexiletine (MEXITIL) 200 mg, Oral, EVERY 12 HOURS    multivitamin (ONE A DAY) tablet 1 Tablet, Oral, DAILY    rOPINIRole (REQUIP) 1 mg tablet TAKE 1 TABLET BY MOUTH EVERY DAY    rosuvastatin (CRESTOR) 20 mg tablet 1 Tablet, Oral, EVERY BEDTIME    sacubitriL-valsartan (Entresto) 24-26 mg tablet 1 Tablet, Oral, 2 TIMES DAILY    Yupelri 175 mcg, Nebulization, DAILY

## 2022-12-20 ENCOUNTER — OFFICE VISIT (OUTPATIENT)
Dept: CARDIOLOGY CLINIC | Age: 87
End: 2022-12-20
Payer: MEDICARE

## 2022-12-20 VITALS
HEART RATE: 84 BPM | WEIGHT: 220 LBS | BODY MASS INDEX: 29.03 KG/M2 | TEMPERATURE: 98.1 F | DIASTOLIC BLOOD PRESSURE: 72 MMHG | OXYGEN SATURATION: 97 % | SYSTOLIC BLOOD PRESSURE: 128 MMHG

## 2022-12-20 DIAGNOSIS — I10 ESSENTIAL HYPERTENSION WITH GOAL BLOOD PRESSURE LESS THAN 140/90: ICD-10-CM

## 2022-12-20 DIAGNOSIS — I25.10 CORONARY ARTERY DISEASE DUE TO LIPID RICH PLAQUE: Primary | ICD-10-CM

## 2022-12-20 DIAGNOSIS — I48.0 PAF (PAROXYSMAL ATRIAL FIBRILLATION) (HCC): ICD-10-CM

## 2022-12-20 DIAGNOSIS — I25.5 ISCHEMIC CARDIOMYOPATHY: ICD-10-CM

## 2022-12-20 DIAGNOSIS — I25.83 CORONARY ARTERY DISEASE DUE TO LIPID RICH PLAQUE: Primary | ICD-10-CM

## 2022-12-20 DIAGNOSIS — E78.00 PURE HYPERCHOLESTEROLEMIA: ICD-10-CM

## 2022-12-20 NOTE — PROGRESS NOTES
Identified pt with two pt identifiers(name and ). Reviewed record in preparation for visit and have obtained necessary documentation. Sushil Sueal. presents today for   Chief Complaint   Patient presents with    Follow-up     PHV       Pt denies  DIZZINESS, SOB, CHEST PAIN/ PRESSURE, FATIGUE/WEAKNESS, HEADACHES, SWELLING. Joel Chairez. preferred language for health care discussion is english/other. Personal Protective Equipment:   Personal Protective Equipment was used including: mask-surgical and hands-gloves. Patient was placed on no precaution(s). Patient was masked. Precautions:   Patient currently on None  Patient currently roomed with door closed. Is someone accompanying this pt? yes    Is the patient using any DME equipment during OV? Yes. Cane     Depression Screening:  3 most recent PHQ Screens 11/15/2022   Little interest or pleasure in doing things Not at all   Feeling down, depressed, irritable, or hopeless Not at all   Total Score PHQ 2 0       Learning Assessment:  Learning Assessment 2020   PRIMARY LEARNER Patient   PRIMARY LANGUAGE ENGLISH   LEARNER PREFERENCE PRIMARY DEMONSTRATION   ANSWERED BY patient   RELATIONSHIP SELF       Abuse Screening:  Abuse Screening Questionnaire 2020   Do you ever feel afraid of your partner? N   Are you in a relationship with someone who physically or mentally threatens you? N   Is it safe for you to go home? Y       Fall Risk  Fall Risk Assessment, last 12 mths 2022   Able to walk? Yes   Fall in past 12 months? -   Do you feel unsteady? -   Are you worried about falling -   Number of falls in past 12 months -   Fall with injury? -       Pt currently taking Anticoagulant therapy? yes  Pt currently taking Antiplatelet therapy? no    Coordination of Care:  1. Have you been to the ER, urgent care clinic since your last visit? Hospitalized since your last visit? Yes. [facility]. syncope    2.  Have you seen or consulted any other health care providers outside of the [address] [place] [address] since your last visit? Include any pap smears or colon screening. yes      Please see Red banners under Allergies and Med Rec to remove outside inquires. All correct information has been verified with patient and added to chart.      Medication's patient's would liked removed has been marked not taking to be removed per Verbal order and read back per Manuel Nelson MD

## 2022-12-20 NOTE — PATIENT INSTRUCTIONS
[facility] view office [phone]    Labs  Lopressor 12.5 mg twice a day  Entresto 24/26 mg 0.5 tab twice a day    No appointment required for lab work  [facility]  Hours are Mon-Fri 7:00 am-3:30 pm  **closed from 12:30 pm-1pm daily**      New Location Address- projected for the month of February 2023    [place] [address], [place] [address]

## 2022-12-20 NOTE — PROGRESS NOTES
[facility]    Mr. Isabela Jean is 80 y.o. 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 Patient recently had to go again to the hospital after having a presyncopal event. Based on description, seems like patient may have a hypertension. Since then patient is taking Entresto and Lopressor only as needed. Patient went to hospital with presyncopal event. During ER visit, felt like patient was in bigeminy. Patient was started on mexiletine however because of side effect patient has stopped taking the medication. Denies any chest pain or chest tightness. Denies any chest pain or chest tightness. Past Medical History:   Diagnosis Date    A-fib [facility])     S/P Cardioversion X 2 (2018. 2019)    AAA (abdominal aortic aneurysm) 2013    S/P Endovascular graft repair    Amiodarone pulmonary toxicity 2019    CAD (coronary artery disease)     S/P m RCA 4.0 X 15 mm USHA (03/22)    Cardiomyopathy ([address]     LVEF 35(11/20) 25% (07/20) 30% (04/20)    COPD (chronic obstructive pulmonary disease) (HCC)     GERD (gastroesophageal reflux disease)     HTN (hypertension)     Sleep apnea     CPAP         Past Surgical History:   Procedure Laterality Date    HX CATARACT REMOVAL Bilateral     HX CERVICAL FUSION      HX KNEE REPLACEMENT Right     NEUROLOGICAL PROCEDURE UNLISTED      VA CARDIAC SURG PROCEDURE UNLIST      ENDOGRAFT AORTA    VA CARDIOVERSION ELECTIVE ARRHYTHMIA EXTERNAL N/A 7/20/2020    EP CARDIOVERSION performed by Ryder Little MD at [facility] CATH LAB    VASCULAR SURGERY PROCEDURE UNLIST      STENT RENAL ARTERY       Current Outpatient Medications   Medication Sig    apixaban (ELIQUIS) 2.5 mg tablet Take 2.5 mg by mouth two (2) times a day. clopidogreL (PLAVIX) 75 mg tab Take 75 mg by mouth daily. metoprolol tartrate (LOPRESSOR) 25 mg tablet Take  by mouth two (2) times a day. rosuvastatin (CRESTOR) 20 mg tablet Take 1 Tablet by mouth nightly. furosemide (LASIX) 20 mg tablet Take 40 mg by mouth daily. sacubitriL-valsartan (Entresto) 24-26 mg tablet Take 1 Tablet by mouth two (2) times a day. budesonide (PULMICORT) 0.5 mg/2 mL nbsp 500 mcg by Nebulization route. budesonide-formoteroL (SYMBICORT) 160-4.5 mcg/actuation HFAA Take 2 Puffs by inhalation. magnesium oxide (MAG-OX) 400 mg tablet Take 400 mg by mouth daily. multivitamin (ONE A DAY) tablet Take 1 Tab by mouth daily. cholecalciferol, vitamin D3, (VITAMIN D3 PO) Take  by mouth three (3) days a week. melatonin 5 mg tablet Take  by mouth nightly as needed. formoterol fumarate (PERFOROMIST IN) Take  by inhalation. revefenacin (Yupelri) 175 mcg/3 mL nebu nebulizer solution 175 mcg by Nebulization route daily. co-enzyme Q-10 (CO Q-10) 100 mg capsule Take 200 mg by mouth daily. rOPINIRole (REQUIP) 1 mg tablet TAKE 1 TABLET BY MOUTH EVERY DAY     No current facility-administered medications for this visit. Allergies and Sensitivities:  Allergies   Allergen Reactions    Amiodarone Other (comments)    Statins-Hmg-Coa Reductase Inhibitors Other (comments)     myalgia       Family History:  No family history on file. Social History:  Social History     Tobacco Use    Smoking status: Former    Smokeless tobacco: Never   Substance Use Topics    Alcohol use: Yes     Alcohol/week: 3.0 standard drinks     Types: 3 Glasses of wine per week    Drug use: Never     He  reports that he has quit smoking. He has never used smokeless tobacco.  He  reports current alcohol use of about 3.0 standard drinks per week. Review of Systems:  Cardiac symptoms as noted above in HPI. All others negative.     Physical Exam:  BP Readings from Last 3 Encounters:   12/20/22 128/72 11/15/22 136/69   10/22/22 (!) 156/80         Pulse Readings from Last 3 Encounters:   12/20/22 84   11/15/22 81   10/21/22 (!) 43          Wt Readings from Last 3 Encounters:   12/20/22 99.8 kg (220 lb)   11/15/22 99.8 kg (220 lb)   10/21/22 97.8 kg (215 lb 9.6 oz)       Constitutional: Oriented to person, place, and time. HENT: Head: Normocephalic and atraumatic. Neck: No JVD present. Carotid bruit is not appreciated. Cardiovascular: Regular rhythm   no murmur, gallop or rubs appreciated  Lung: Breath sounds normal. No respiratory distress. No ronchi or rales appreciated  Abdominal: No tenderness. No rebound and no guarding. Musculoskeletal: There is no ankle edema. No cynosis    Review of Data  LABS:   Lab Results   Component Value Date/Time    Sodium 142 12/06/2022 08:35 AM    Potassium 4.1 12/06/2022 08:35 AM    Chloride 106 12/06/2022 08:35 AM    CO2 29 12/06/2022 08:35 AM    Glucose 95 12/06/2022 08:35 AM    BUN 19 (H) 12/06/2022 08:35 AM    Creatinine 1.49 (H) 12/06/2022 08:35 AM     No flowsheet data found. Lab Results   Component Value Date/Time    ALT (SGPT) 53 06/08/2020 09:39 AM     No results found for: HBA1C, JMU7CYRQ, UMZ5HISE, EVV2UBLM    EKG  Atrial fibrillation with rapid ventricular response at heart rate of 120 bpm.  Left bundle branch block    Stress Test (11/22)  CONCLUSIONS     * stress and rest myocardial perfusion study without evidence of inducible ischemia or scar. * ABNormal left ventricular function with calculated LVEF 44 %. * Stress SPECT tomographic images reveal homogeneous tracer uptake throughout the myocardium. Rest images show no significant change. * Patient did not have chest pain in response to stress. CARDIAC PROCEDURE 03/25/2022 3/25/2022  LM: Normal  LAD: Mid 40% narrowing after origin of bifurcating diagonal branch.   Diagonal branch: Ostial 80% stenosis with early bifurcation into superior inferior branch without any other obstructive disease medical management for diagonal branch disease  LCx/OM: Normal  RCA: Mid 90% tubular stenosis  Status post PCI and stenting of mid RCA 90% stenosis using 4.0 x 15 mm USHA    IMPRESSION & PLAN:  Mr. Reji Rae is 80 y.o. with multiple medical problem    CAD:  Cardiac catheterization in 03/2022 with 90% mid RCA stenosis requiring 4.0 x 15 mm USHA  Mild-moderate LAD disease . And 80% ostial diagonal disease being managed medically  Continue Plavix and Eliquis. currently on metoprolol  he has been intolerant to 4 different statins in the past.  Has decided against PCSK9 inhibitor    Cardiomyopathy:  LVEF 30% by echo in April 2020. Most likely from A. fib and tachycardia. LVEF 25% in July 2020. Underwent cardioversion for atrial fibrillation successfully in July 2020  LVEF 35% in November 2020  LVEF 25-30% by echo in 02/2022  LVEF 25% by echo in 09/2022    S/p BiV CRT P and AV tae ablation in 09/2022    Started [place] [id number] in 10/22 for frequent PVC however patient stopped it as it cause ? ? Side effect, hypotension  Now on metoprolol and Entresto. But will reduce dose to 12.5 mg BID and entresto 1/2 tab BID as well because of recently hypotension related ? ? Dizziness and presyncope    will have patient seen by electrophysiology colleague for help in management of BiV CRT and congestive heart failure. Atrial fibrillation:  Paroxysmal in nature according to patient. Initially diagnosed in 2018. Had cardioversion successfully in 2018 and again in 2019. Status post successful cardioversion again in July 2020  Back in a.fib in 11/2021. Has not been able to use amiodarone in the past because of significant side effects and pulmonary toxicity. Has seen electrophysiology colleague in July 2020. Suboptimal candidate for pulmonary vein isolation or antiarrhythmic medication. S/p BiV CRT P and AV tae ablation in 09/2022 at [facility] check according to EP clinic protocol.     HLD:  Intolerance to statin with leg cramps and itching and skin rash. Tried 4 different statins in past.  Has decided against PCSK9 inhibitor in the past.  Now trying Crestor again. Hypertension: Now on metoprolol and Entresto. But will reduce dose to 12.5 mg BID and entresto 1/2 tab BID as well because of recently hypotension related ? ? Dizziness and presyncope    COPD, emphysema: Defer to Dr. Malinda Nava. This plan was discussed with patient who is in agreement. Thank you for allowing me to participate in patient care. Please feel free to call me if you have any question or concern. Paulette Umana MD  Please note: This document has been produced using voice recognition software. Unrecognized errors in transcription may be present.

## 2022-12-20 NOTE — LETTER
12/20/2022    Patient: Tai Renee. YOB: 1931   Date of Visit: 12/20/2022     Shaylee Still MD  [address] [place] [id number]  Via Fax: [phone]    Dear Shaylee Still MD,      Thank you for referring Mr. Hang Katz to [address] [place] [address] for evaluation. My notes for this consultation are attached. If you have questions, please do not hesitate to call me. I look forward to following your patient along with you.       Sincerely,    Elisabet Pickering MD

## 2022-12-23 NOTE — PROGRESS NOTES
I have personally seen and evaluated the device findings. Interrogation reviewed and I agree with assessment.     Romulo Knott

## 2023-01-13 ENCOUNTER — TELEPHONE (OUTPATIENT)
Dept: CARDIOLOGY CLINIC | Age: 88
End: 2023-01-13

## 2023-01-13 RX ORDER — SACUBITRIL AND VALSARTAN 24; 26 MG/1; MG/1
TABLET, FILM COATED ORAL
Qty: 180 TABLET | Refills: 3 | Status: SHIPPED | OUTPATIENT
Start: 2023-01-13

## 2023-01-13 NOTE — TELEPHONE ENCOUNTER
Patient took last entresto pill last night. Patient wife called needing refill.  Request already put in but wants to know if it will be ready for the weekend

## 2023-01-13 NOTE — TELEPHONE ENCOUNTER
PCP: Jd Carrington MD    Last appt: 12/20/2022  Future Appointments   Date Time Provider Nurys Coronel   5/16/2023  2:00 PM Mary Romero MD [facility] BS AMB   6/20/2023 12:00 PM CSI, PACER NORF [facility] BS AMB       Requested Prescriptions     Pending Prescriptions Disp Refills    Entresto 24-26 mg tablet [Pharmacy Med Name: ENTRESTO 24 MG-26 MG TABLET] 180 Tablet 3     Sig: TAKE 1 TABLET BY MOUTH TWO TIMES A DAY.

## 2023-03-16 RX ORDER — FUROSEMIDE 40 MG/1
TABLET ORAL
Qty: 90 TABLET | Refills: 2 | Status: SHIPPED | OUTPATIENT
Start: 2023-03-16

## 2023-03-16 NOTE — TELEPHONE ENCOUNTER
PCP: Jace Espinosa appt: [unfilled]  Future Appointments   Date Time Provider Paulo Lee   5/16/2023  2:00 PM Dmitry Simon MD [facility] BS AMB   6/20/2023 12:00 PM CSI, PACER HV [facility] BS AMB       Requested Prescriptions     Pending Prescriptions Disp Refills    furosemide (LASIX) 40 MG tablet [Pharmacy Med Name: FUROSEMIDE 40 MG TABLET] 90 tablet 2     Sig: TAKE 1 TABLET BY MOUTH EVERY DAY

## 2023-05-16 ENCOUNTER — OFFICE VISIT (OUTPATIENT)
Age: 88
End: 2023-05-16
Payer: MEDICARE

## 2023-05-16 VITALS
DIASTOLIC BLOOD PRESSURE: 69 MMHG | BODY MASS INDEX: 29.55 KG/M2 | TEMPERATURE: 98.8 F | WEIGHT: 224 LBS | OXYGEN SATURATION: 98 % | SYSTOLIC BLOOD PRESSURE: 173 MMHG | HEART RATE: 43 BPM

## 2023-05-16 DIAGNOSIS — I25.83 CORONARY ATHEROSCLEROSIS DUE TO LIPID RICH PLAQUE (CODE): Primary | ICD-10-CM

## 2023-05-16 DIAGNOSIS — I10 ESSENTIAL HYPERTENSION WITH GOAL BLOOD PRESSURE LESS THAN 140/90: ICD-10-CM

## 2023-05-16 DIAGNOSIS — I48.0 PAF (PAROXYSMAL ATRIAL FIBRILLATION) (HCC): ICD-10-CM

## 2023-05-16 PROCEDURE — 1123F ACP DISCUSS/DSCN MKR DOCD: CPT | Performed by: INTERNAL MEDICINE

## 2023-05-16 PROCEDURE — G8428 CUR MEDS NOT DOCUMENT: HCPCS | Performed by: INTERNAL MEDICINE

## 2023-05-16 PROCEDURE — G8419 CALC BMI OUT NRM PARAM NOF/U: HCPCS | Performed by: INTERNAL MEDICINE

## 2023-05-16 PROCEDURE — 99214 OFFICE O/P EST MOD 30 MIN: CPT | Performed by: INTERNAL MEDICINE

## 2023-05-16 PROCEDURE — 1036F TOBACCO NON-USER: CPT | Performed by: INTERNAL MEDICINE

## 2023-05-16 RX ORDER — FUROSEMIDE 20 MG/1
20 TABLET ORAL DAILY
COMMUNITY

## 2023-05-16 NOTE — PROGRESS NOTES
Identified pt with two pt identifiers(name and ). Reviewed record in preparation for visit and have obtained necessary documentation. [employer] presents today for   Chief Complaint   Patient presents with    Follow-up     6m       Pt c/o  SOB. Jordy Alonzo Asp. preferred language for health care discussion is english/other. Personal Protective Equipment:   Personal Protective Equipment was used including: mask-surgical and hands-gloves. Patient was placed on no precaution(s). Patient was masked. Precautions:   Patient currently on None  Patient currently roomed with door closed. Is someone accompanying this pt? wife    Is the patient using any DME equipment during [address]? cane    Depression Screening:  PHQ-9 Questionaire 2022 11/15/2022 2022 2022 2022 3/15/2022 2021   Little interest or pleasure in doing things 0 0 0 0 0 0 0   Feeling down, depressed, or hopeless 0 0 0 0 0 0 0   PHQ-9 Total Score 0 0 0 0 0 0 0        Learning Assessment:  No question data found. Abuse Screening: AMB Abuse Screening 2023   Do you ever feel afraid of your partner? N   Are you in a relationship with someone who physically or mentally threatens you? N   Is it safe for you to go home? N          Fall Risk  No flowsheet data found. Pt currently taking Anticoagulant therapy? ys  Pt currently taking Antiplatelet therapy?no    Coordination of Care:  1. Have you been to the ER, urgent care clinic since your last visit? Hospitalized since your last visit? no    2. Have you seen or consulted any other health care providers outside of the [address] [place] [address] since your last visit? Include any pap smears or colon screening. no      Please see Red banners under Allergies and Med Rec to remove outside inquires. All correct information has been verified with patient and added to chart.      Medication's patient's would liked removed has been marked not taking to be
appreciated  Abdominal: No tenderness. No rebound and no guarding. Musculoskeletal: There is no lower extremity edema. No cynosis    LABS:   @  Lab Results   Component Value Date/Time    WBC 7.6 08/01/2022 11:20 AM    HGB 13.2 08/01/2022 11:20 AM    HCT 40.7 08/01/2022 11:20 AM     08/01/2022 11:20 AM    MCV 93.3 08/01/2022 11:20 AM     Lab Results   Component Value Date/Time     12/06/2022 08:35 AM    K 4.1 12/06/2022 08:35 AM     12/06/2022 08:35 AM    CO2 29 12/06/2022 08:35 AM    BUN 19 12/06/2022 08:35 AM    CREATININE 1.49 12/06/2022 08:35 AM    GLUCOSE 95 12/06/2022 08:35 AM    CALCIUM 9.2 12/06/2022 08:35 AM       No flowsheet data found. Lab Results   Component Value Date/Time    ALT 53 06/08/2020 09:39 AM     No results found for: LABA1C  Lab Results   Component Value Date    TSH 1.31 08/01/2022     EKG   Atrial fibrillation with rapid ventricular response at heart rate of 120 bpm.  Left bundle branch block      Stress Test (11/22)   CONCLUSIONS     * stress and rest myocardial perfusion study without evidence of inducible ischemia or scar. * ABNormal left ventricular function with calculated  LVEF 44 %. * Stress SPECT tomographic images reveal homogeneous tracer uptake throughout the myocardium. Rest images show no significant change. * Patient did not have chest pain in response to stress. CARDIAC PROCEDURE 03/25/2022 3/25/2022   LM: Normal   LAD: Mid 40% narrowing after origin of bifurcating diagonal branch. Diagonal branch: Ostial 80% stenosis with early bifurcation into superior inferior branch without any other obstructive disease medical management for diagonal branch disease   LCx/OM: Normal   RCA: Mid 90% tubular stenosis   Status post PCI and stenting of mid RCA 90% stenosis using 4.0 x 15 mm RYANN      IMPRESSION & PLAN:   Mr. Valdez Guzman is 80 y.o. . with multiple medical problem      CAD:   Cardiac catheterization in 03/2022 with 90% mid RCA stenosis requiring

## 2023-05-16 NOTE — PATIENT INSTRUCTIONS
New Medication/Medication Changes  Take 1/2 tab of  Entresto 24-26 mg twice daily     Take Toprol 25  mg daily unless BP is out of diameters if so then hold    Take lasix 20 mg daily         **please allow 24-48 hrs for medication to be escribed to pharmacy** If you need any refills on medications please contact your pharmacy so that the request can be escribed to the provider for review seven to 10 days prior to being out of medication.

## 2023-06-28 RX ORDER — METOPROLOL SUCCINATE 25 MG/1
TABLET, EXTENDED RELEASE ORAL
Qty: 90 TABLET | Refills: 1 | Status: SHIPPED | OUTPATIENT
Start: 2023-06-28

## 2023-06-30 RX ORDER — FUROSEMIDE 20 MG/1
40 TABLET ORAL DAILY
Qty: 60 TABLET | Refills: 3 | Status: SHIPPED | OUTPATIENT
Start: 2023-06-30

## 2023-09-25 RX ORDER — FUROSEMIDE 20 MG/1
40 TABLET ORAL DAILY
Qty: 180 TABLET | Refills: 1 | Status: SHIPPED | OUTPATIENT
Start: 2023-09-25

## 2023-11-30 ENCOUNTER — OFFICE VISIT (OUTPATIENT)
Age: 88
End: 2023-11-30
Payer: MEDICARE

## 2023-11-30 VITALS
HEART RATE: 86 BPM | SYSTOLIC BLOOD PRESSURE: 149 MMHG | DIASTOLIC BLOOD PRESSURE: 66 MMHG | BODY MASS INDEX: 27.7 KG/M2 | WEIGHT: 209 LBS | HEIGHT: 73 IN | OXYGEN SATURATION: 93 %

## 2023-11-30 DIAGNOSIS — I25.10 CORONARY ARTERY DISEASE DUE TO LIPID RICH PLAQUE: Primary | ICD-10-CM

## 2023-11-30 DIAGNOSIS — I25.83 CORONARY ARTERY DISEASE DUE TO LIPID RICH PLAQUE: Primary | ICD-10-CM

## 2023-11-30 DIAGNOSIS — I10 ESSENTIAL HYPERTENSION WITH GOAL BLOOD PRESSURE LESS THAN 140/90: ICD-10-CM

## 2023-11-30 DIAGNOSIS — E78.00 PURE HYPERCHOLESTEROLEMIA: ICD-10-CM

## 2023-11-30 PROCEDURE — G8419 CALC BMI OUT NRM PARAM NOF/U: HCPCS | Performed by: INTERNAL MEDICINE

## 2023-11-30 PROCEDURE — 99214 OFFICE O/P EST MOD 30 MIN: CPT | Performed by: INTERNAL MEDICINE

## 2023-11-30 PROCEDURE — G8484 FLU IMMUNIZE NO ADMIN: HCPCS | Performed by: INTERNAL MEDICINE

## 2023-11-30 PROCEDURE — G8428 CUR MEDS NOT DOCUMENT: HCPCS | Performed by: INTERNAL MEDICINE

## 2023-11-30 PROCEDURE — 1123F ACP DISCUSS/DSCN MKR DOCD: CPT | Performed by: INTERNAL MEDICINE

## 2023-11-30 PROCEDURE — 1036F TOBACCO NON-USER: CPT | Performed by: INTERNAL MEDICINE

## 2023-11-30 NOTE — PROGRESS NOTES
[facility]    Kari Mariajose. is 80 y.o. 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 Mild shortness of breath on moderate exertion. Taking Lasix 40 mg in the morning and occasionally in the evening depending on the weight in the shortness of breath  Patient was admitted in the hospital in 07/2023 with unknown infection and was on IV antibiotic for 4 weeks followed by oral antibiotic for almost 5 months. Was following with ID team as outpatient and now off of antibiotics. No vegetation was noted on echo     Past Medical History:   Diagnosis Date    A-fib ([address])     S/P Cardioversion X 2 (2018. 2019)    AAA (abdominal aortic aneurysm) ([address]) 2013    S/P Endovascular graft repair    Amiodarone pulmonary toxicity 2019    CAD (coronary artery disease)     S/P m RCA 4.0 X 15 mm RYANN (03/22)    Cardiomyopathy ([address])     LVEF 35(11/20) 25% (07/20) 30% (04/20)    COPD (chronic obstructive pulmonary disease) (HCC)     GERD (gastroesophageal reflux disease)     HTN (hypertension)     Sleep apnea     CPAP       Review of Systems:  Cardiac symptoms as noted above in HPI. All others negative.     Current Outpatient Medications   Medication Sig    furosemide (LASIX) 20 MG tablet TAKE 2 TABLETS BY MOUTH DAILY    metoprolol succinate (TOPROL XL) 25 MG extended release tablet TAKE 1 TABLET BY MOUTH EVERY DAY    apixaban (ELIQUIS) 2.5 MG TABS tablet Take 1 tablet by mouth 2 times daily    budesonide (PULMICORT) 0.5 MG/2ML nebulizer suspension Inhale 2 mLs into the lungs    clopidogrel (PLAVIX) 75 MG tablet Take 1 tablet by mouth daily    coenzyme Q10 100 MG CAPS capsule Take 2

## 2024-01-30 ENCOUNTER — OFFICE VISIT (OUTPATIENT)
Age: 89
End: 2024-01-30
Payer: MEDICARE

## 2024-01-30 VITALS
BODY MASS INDEX: 27.05 KG/M2 | SYSTOLIC BLOOD PRESSURE: 125 MMHG | HEART RATE: 135 BPM | OXYGEN SATURATION: 92 % | DIASTOLIC BLOOD PRESSURE: 82 MMHG | WEIGHT: 205 LBS

## 2024-01-30 DIAGNOSIS — E78.00 PURE HYPERCHOLESTEROLEMIA: ICD-10-CM

## 2024-01-30 DIAGNOSIS — I25.83 CORONARY ARTERY DISEASE DUE TO LIPID RICH PLAQUE: ICD-10-CM

## 2024-01-30 DIAGNOSIS — I10 ESSENTIAL HYPERTENSION WITH GOAL BLOOD PRESSURE LESS THAN 140/90: ICD-10-CM

## 2024-01-30 DIAGNOSIS — I48.0 PAF (PAROXYSMAL ATRIAL FIBRILLATION) (HCC): Primary | ICD-10-CM

## 2024-01-30 DIAGNOSIS — I25.10 CORONARY ARTERY DISEASE DUE TO LIPID RICH PLAQUE: ICD-10-CM

## 2024-01-30 PROCEDURE — G8419 CALC BMI OUT NRM PARAM NOF/U: HCPCS | Performed by: INTERNAL MEDICINE

## 2024-01-30 PROCEDURE — 99214 OFFICE O/P EST MOD 30 MIN: CPT | Performed by: INTERNAL MEDICINE

## 2024-01-30 PROCEDURE — G8484 FLU IMMUNIZE NO ADMIN: HCPCS | Performed by: INTERNAL MEDICINE

## 2024-01-30 PROCEDURE — 1123F ACP DISCUSS/DSCN MKR DOCD: CPT | Performed by: INTERNAL MEDICINE

## 2024-01-30 PROCEDURE — 1036F TOBACCO NON-USER: CPT | Performed by: INTERNAL MEDICINE

## 2024-01-30 PROCEDURE — G8428 CUR MEDS NOT DOCUMENT: HCPCS | Performed by: INTERNAL MEDICINE

## 2024-01-30 NOTE — PATIENT INSTRUCTIONS
New Medication/Medication Changes  Toprol 25 mg twice a day     **please allow 24-48 hrs for medication to be escribed to pharmacy** If you need any refills on medications please contact your pharmacy so that the request can be escribed to the provider for review seven to 10 days prior to being out of medication.

## 2024-01-30 NOTE — PROGRESS NOTES
Identified pt with two pt identifiers(name and ). Reviewed record in preparation for visit and have obtained necessary documentation.    Jordy Saunders Jr. presents today for   Chief Complaint   Patient presents with    Follow-up       Pt c/o  SOB, FATIGUE, SWELLING.             Jordy Saunders Jr. preferred language for health care discussion is english/other.    Personal Protective Equipment:   Personal Protective Equipment was used including: mask-surgical and hands-gloves. Patient was placed on no precaution(s). Patient was not masked.    Precautions:   Patient currently on None  Patient currently roomed with door closed.    Is someone accompanying this pt? wife    Is the patient using any DME equipment during OV? cane    Depression Screenin/20/2022    10:38 AM 11/15/2022     3:28 PM 2022     1:31 PM 2022     2:58 PM 2022     1:06 PM 3/15/2022     2:10 PM 2021     2:37 PM   PHQ-9 Questionaire   Little interest or pleasure in doing things 0 0 0 0 0 0 0   Feeling down, depressed, or hopeless 0 0 0 0 0 0 0   PHQ-9 Total Score 0 0 0 0 0 0 0        Learning Assessment:  No question data found.    Abuse Screenin/30/2024     2:00 PM 2023     1:00 PM   AMB Abuse Screening   Do you ever feel afraid of your partner? N N   Are you in a relationship with someone who physically or mentally threatens you? N N   Is it safe for you to go home? Y N          Fall Risk      2024     2:45 PM   Fall Risk   2 or more falls in past year? no   Fall with injury in past year? no         Pt currently taking Anticoagulant therapy? no  Pt currently taking Antiplatelet therapy? no    Coordination of Care:  1. Have you been to the ER, urgent care clinic since your last visit? Hospitalized since your last visit? no    2. Have you seen or consulted any other health care providers outside of the [facility] since your last visit? Include any pap smears or colon screening. 
pressure usually 110-120 systolic       HLD:   Intolerance to statin with leg cramps and itching and skin rash. Tried 4 different statins in past.   Has decided against PCSK9 inhibitor in the past.  Currently taking Crestor again.      Hypertension: /82 continue Entresto and Toprol. On exam his heart rate is elevated and he is in A-fib and irregular rhythm.  Completely asymptomatic.  Currently taking metoprolol 25 mg daily.  Used to take twice a day in the past.  I will increase metoprolol twice daily.  If necessary, have asked patient to reduce dose of Entresto half a tablet twice daily if the blood pressure less than 100.  At home blood pressure usually 110-120 systolic      COPD, emphysema: Defer to Dr. Jones.      This plan was discussed with patient who is in agreement.    Thank you for allowing me to participate in patient care. Please feel free to call me if you have any question or concern.     Dmitry Padgett MD  Please note: This document has been produced using voice recognition software. Unrecognized errors in transcription may be present.

## 2024-02-23 ENCOUNTER — OFFICE VISIT (OUTPATIENT)
Age: 89
End: 2024-02-23
Payer: MEDICARE

## 2024-02-23 VITALS
HEART RATE: 69 BPM | SYSTOLIC BLOOD PRESSURE: 94 MMHG | DIASTOLIC BLOOD PRESSURE: 60 MMHG | BODY MASS INDEX: 27.05 KG/M2 | OXYGEN SATURATION: 98 % | WEIGHT: 205 LBS

## 2024-02-23 DIAGNOSIS — E78.00 PURE HYPERCHOLESTEROLEMIA: ICD-10-CM

## 2024-02-23 DIAGNOSIS — I25.83 CORONARY ARTERY DISEASE DUE TO LIPID RICH PLAQUE: Primary | ICD-10-CM

## 2024-02-23 DIAGNOSIS — I25.10 CORONARY ARTERY DISEASE DUE TO LIPID RICH PLAQUE: Primary | ICD-10-CM

## 2024-02-23 DIAGNOSIS — I10 ESSENTIAL HYPERTENSION WITH GOAL BLOOD PRESSURE LESS THAN 140/90: ICD-10-CM

## 2024-02-23 PROCEDURE — 1036F TOBACCO NON-USER: CPT | Performed by: INTERNAL MEDICINE

## 2024-02-23 PROCEDURE — G8428 CUR MEDS NOT DOCUMENT: HCPCS | Performed by: INTERNAL MEDICINE

## 2024-02-23 PROCEDURE — 99214 OFFICE O/P EST MOD 30 MIN: CPT | Performed by: INTERNAL MEDICINE

## 2024-02-23 PROCEDURE — G8484 FLU IMMUNIZE NO ADMIN: HCPCS | Performed by: INTERNAL MEDICINE

## 2024-02-23 PROCEDURE — 1123F ACP DISCUSS/DSCN MKR DOCD: CPT | Performed by: INTERNAL MEDICINE

## 2024-02-23 PROCEDURE — G8419 CALC BMI OUT NRM PARAM NOF/U: HCPCS | Performed by: INTERNAL MEDICINE

## 2024-02-23 RX ORDER — METOLAZONE 2.5 MG/1
2.5 TABLET ORAL DAILY
COMMUNITY

## 2024-02-23 NOTE — PROGRESS NOTES
[facility]    Jordy Saunders Jr. is 89 y.o. 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  Continues to have mild shortness of breath.  Has been having fluctuating blood pressure and weight.  Seen by pulmonary team recently and was advised to come see me.  Has been taking Lasix 60 mg twice daily under supervision of primary care provider.  Prescribed metolazone by Dr. Jones.  Continues to have on and off fluctuation of the weight     Past Medical History:   Diagnosis Date    A-fib ([facility])     S/P Cardioversion X 2 (2018. 2019)    AAA (abdominal aortic aneurysm) ([facility]) 2013    S/P Endovascular graft repair    Amiodarone pulmonary toxicity 2019    CAD (coronary artery disease)     S/P m RCA 4.0 X 15 mm RYANN (03/22)    Cardiomyopathy ([facility])     LVEF 35(11/20) 25% (07/20) 30% (04/20)    COPD (chronic obstructive pulmonary disease) ([facility])     GERD (gastroesophageal reflux disease)     HTN (hypertension)     Sleep apnea     CPAP       Review of Systems:  Cardiac symptoms as noted above in HPI. All others negative.    Current Outpatient Medications   Medication Sig    metOLazone (ZAROXOLYN) 2.5 MG tablet Take 1 tablet by mouth daily    furosemide (LASIX) 20 MG tablet TAKE 2 TABLETS BY MOUTH DAILY    metoprolol succinate (TOPROL XL) 25 MG extended release tablet TAKE 1 TABLET BY MOUTH EVERY DAY (Patient taking differently: in the morning and at bedtime)    apixaban (ELIQUIS) 2.5 MG TABS tablet Take 1 tablet by mouth 2 times daily    budesonide (PULMICORT) 0.5 MG/2ML nebulizer suspension Inhale 2 mLs into the lungs    clopidogrel (PLAVIX) 75 MG tablet Take 1 tablet by mouth daily

## 2024-03-06 NOTE — PROGRESS NOTES
[facility]    Jordy Saunders Jr. is a 89 y.o. male who presents to the office today for cardiac evaluation for his congestive heart failure.  Patient reports recently being started on metolazone and Lasix. He reports improvement in his swelling as well as his breathing after starting this regimen.  He does admit to having increasing fatigue.  According to his wife his blood pressure at home is all over the place.  The lowest it has been with systolic pressures in the high 80s. He has been adjusting his blood pressure medication regimen according to his blood pressure trends at home.  He does admit to going to eat at [facility] last week where he had unintentionally eaten a meal with increased sodium for dinner.  Otherwise he is doing well. He has no swelling on exam today.  He is turning 39 on the ninth and is looking forward to having dinner with his family. They would like to return for a close follow-up visit in 2 weeks prior to their vacation to [place].  Denies chest pain, weight gain, worsening shortness of breath, diaphoresis, near-syncope or syncope.     Past Medical History:   Diagnosis Date    A-fib ([facility])     S/P Cardioversion X 2 (2018. 2019)    AAA (abdominal aortic aneurysm) ([facility]) 2013    S/P Endovascular graft repair    Amiodarone pulmonary toxicity 2019    CAD (coronary artery disease)     S/P m RCA 4.0 X 15 mm RYANN (03/22)    Cardiomyopathy ([facility])     LVEF 35(11/20) 25% (07/20) 30% (04/20)    COPD (chronic obstructive pulmonary disease) ([facility])     GERD (gastroesophageal reflux disease)     HTN (hypertension)     Sleep apnea     CPAP     Past Surgical History:   Procedure Laterality Date    CARDIOVERSION ELECTIVE ARRHYTHMIA EXTERNAL N/A 7/20/2020    EP CARDIOVERSION [place] Dmitry Padgett [place] [facility]    CATARACT REMOVAL Bilateral     CERVICAL FUSION      NEUROLOGICAL SURGERY      DC UNLISTED PROCEDURE CARDIAC SURGERY      ENDOGRAFT AORTA    TOTAL KNEE ARTHROPLASTY

## 2024-03-07 ENCOUNTER — OFFICE VISIT (OUTPATIENT)
Age: 89
End: 2024-03-07
Payer: MEDICARE

## 2024-03-07 VITALS
BODY MASS INDEX: 25.64 KG/M2 | WEIGHT: 193.5 LBS | DIASTOLIC BLOOD PRESSURE: 66 MMHG | OXYGEN SATURATION: 97 % | HEIGHT: 73 IN | HEART RATE: 72 BPM | SYSTOLIC BLOOD PRESSURE: 102 MMHG | TEMPERATURE: 97.5 F | RESPIRATION RATE: 17 BRPM

## 2024-03-07 DIAGNOSIS — I25.5 ISCHEMIC CARDIOMYOPATHY: ICD-10-CM

## 2024-03-07 DIAGNOSIS — I25.10 CORONARY ARTERY DISEASE DUE TO LIPID RICH PLAQUE: ICD-10-CM

## 2024-03-07 DIAGNOSIS — I25.83 CORONARY ARTERY DISEASE DUE TO LIPID RICH PLAQUE: ICD-10-CM

## 2024-03-07 DIAGNOSIS — I48.0 PAROXYSMAL ATRIAL FIBRILLATION (HCC): ICD-10-CM

## 2024-03-07 DIAGNOSIS — I10 ESSENTIAL HYPERTENSION WITH GOAL BLOOD PRESSURE LESS THAN 140/90: Primary | ICD-10-CM

## 2024-03-07 DIAGNOSIS — Z95.828 HISTORY OF ENDOVASCULAR STENT GRAFT FOR ABDOMINAL AORTIC ANEURYSM (AAA): ICD-10-CM

## 2024-03-07 DIAGNOSIS — E78.5 HYPERLIPIDEMIA, UNSPECIFIED HYPERLIPIDEMIA TYPE: ICD-10-CM

## 2024-03-07 PROCEDURE — G8427 DOCREV CUR MEDS BY ELIG CLIN: HCPCS | Performed by: PHYSICIAN ASSISTANT

## 2024-03-07 PROCEDURE — G8484 FLU IMMUNIZE NO ADMIN: HCPCS | Performed by: PHYSICIAN ASSISTANT

## 2024-03-07 PROCEDURE — 1123F ACP DISCUSS/DSCN MKR DOCD: CPT | Performed by: PHYSICIAN ASSISTANT

## 2024-03-07 PROCEDURE — 99214 OFFICE O/P EST MOD 30 MIN: CPT | Performed by: PHYSICIAN ASSISTANT

## 2024-03-07 PROCEDURE — 1036F TOBACCO NON-USER: CPT | Performed by: PHYSICIAN ASSISTANT

## 2024-03-07 PROCEDURE — G8419 CALC BMI OUT NRM PARAM NOF/U: HCPCS | Performed by: PHYSICIAN ASSISTANT

## 2024-03-07 ASSESSMENT — PATIENT HEALTH QUESTIONNAIRE - PHQ9
SUM OF ALL RESPONSES TO PHQ QUESTIONS 1-9: 0
1. LITTLE INTEREST OR PLEASURE IN DOING THINGS: 0
SUM OF ALL RESPONSES TO PHQ QUESTIONS 1-9: 0
2. FEELING DOWN, DEPRESSED OR HOPELESS: 0

## 2024-03-07 NOTE — PROGRESS NOTES
Identified pt with two pt identifiers(name and ). Reviewed record in preparation for visit and have obtained necessary documentation.    Jordy Saunders Jr. presents today for   Chief Complaint   Patient presents with    Follow-up       Pt c/o DIZZINESS, SOB, FATIGUE/WEAKNESS, SWELLING.             Jordy Saunders Jr. preferred language for health care discussion is english/other.    Personal Protective Equipment:   Personal Protective Equipment was used including: mask-surgical and hands-gloves. Patient was placed on no precaution(s). Patient was not masked.    Precautions:   Patient currently on None  Patient currently roomed with door closed.    Is someone accompanying this pt? yes    Is the patient using any DME equipment during OV? cane    Depression Screening:      3/7/2024    10:22 AM 2022    10:38 AM 11/15/2022     3:28 PM 2022     1:31 PM 2022     2:58 PM 2022     1:06 PM 3/15/2022     2:10 PM   PHQ-9 Questionaire   Little interest or pleasure in doing things 0 0 0 0 0 0 0   Feeling down, depressed, or hopeless 0 0 0 0 0 0 0   PHQ-9 Total Score 0 0 0 0 0 0 0        Learning Assessment:  Who is the primary learner? Patient    What is the preferred language for health care of the primary learner? ENGLISH    How does the primary learner prefer to learn new concepts? DEMONSTRATION    Answered By patient    Relationship to Learner SELF        Abuse Screenin/30/2024     2:00 PM 2023     1:00 PM   AMB Abuse Screening   Do you ever feel afraid of your partner? N N   Are you in a relationship with someone who physically or mentally threatens you? N N   Is it safe for you to go home? Y N          Fall Risk      3/7/2024    10:22 AM 2024     2:45 PM   Fall Risk   2 or more falls in past year? no no   Fall with injury in past year? no no         Pt currently taking Anticoagulant therapy? Eliquis 2.5 mg   Pt currently taking Antiplatelet therapy? Plavix 5 mg    Coordination of

## 2024-03-07 NOTE — PATIENT INSTRUCTIONS
Labs  BMP    No appointment required for lab work  *[facility], [address] [place] [address]  ( M - Thur 8am to 3:30pm.) - You must call to make an appointment for blood work at this site.   Contact :[phone]  *[facility] [address] [facility]  *[facility], [address] [place] [address]  *[facility], [address] [facility]  *[facility], [address] [facility]

## 2024-03-12 ENCOUNTER — HOSPITAL ENCOUNTER (OUTPATIENT)
Facility: HOSPITAL | Age: 89
Setting detail: SPECIMEN
Discharge: HOME | End: 2024-03-15
Payer: MEDICARE

## 2024-03-12 DIAGNOSIS — I10 ESSENTIAL HYPERTENSION WITH GOAL BLOOD PRESSURE LESS THAN 140/90: ICD-10-CM

## 2024-03-12 LAB
ANION GAP SERPL CALC-SCNC: 11 MMOL/L (ref 3–18)
BUN SERPL-MCNC: 42 MG/DL (ref 7–18)
BUN/CREAT SERPL: 29 (ref 12–20)
CALCIUM SERPL-MCNC: 9.2 MG/DL (ref 8.5–10.1)
CHLORIDE SERPL-SCNC: 89 MMOL/L (ref 100–111)
CO2 SERPL-SCNC: 34 MMOL/L (ref 21–32)
CREAT SERPL-MCNC: 1.43 MG/DL (ref 0.6–1.3)
GLUCOSE SERPL-MCNC: 87 MG/DL (ref 74–99)
POTASSIUM SERPL-SCNC: 3 MMOL/L (ref 3.5–5.5)
SODIUM SERPL-SCNC: 134 MMOL/L (ref 136–145)

## 2024-03-12 PROCEDURE — 36415 COLL VENOUS BLD VENIPUNCTURE: CPT

## 2024-03-12 PROCEDURE — 80048 BASIC METABOLIC PNL TOTAL CA: CPT

## 2024-03-14 ENCOUNTER — TELEPHONE (OUTPATIENT)
Age: 89
End: 2024-03-14

## 2024-03-14 RX ORDER — POTASSIUM CHLORIDE 20 MEQ/1
20 TABLET, EXTENDED RELEASE ORAL DAILY
Qty: 30 TABLET | Refills: 0 | Status: CANCELLED | OUTPATIENT
Start: 2024-03-14

## 2024-03-14 NOTE — TELEPHONE ENCOUNTER
Discussed low potassium findings with patient's wife. Rx of po K+ replacement sent. Will repeat BMP.

## 2024-03-19 ENCOUNTER — PROCEDURE VISIT (OUTPATIENT)
Age: 88
End: 2024-03-19
Payer: MEDICARE

## 2024-03-19 DIAGNOSIS — I25.5 ISCHEMIC CARDIOMYOPATHY: ICD-10-CM

## 2024-03-19 DIAGNOSIS — Z95.0 PACEMAKER: Primary | ICD-10-CM

## 2024-03-19 PROCEDURE — 93281 PM DEVICE PROGR EVAL MULTI: CPT | Performed by: INTERNAL MEDICINE

## 2024-03-20 NOTE — PROGRESS NOTES
[facility]    Jordy Saunders Jr. is a 89 y.o. 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     Patient presents to the office today for cardiac evaluation for his congestive heart failure. Patient has been taking Metolazone two times a week, as well as lasix twice a day. Since starting this regimen, he feels that there has been some improvement with his breathing and swelling.  According to his wife his heart rates have been more variable at home, sometimes as high as 140s.  Patient denies any palpitations or dizziness . His blood pressures have also been ranging in the low 100s he has not been able to tolerate his Entresto. Denies chest pain, worsening shortness of breath, diaphoresis, near-syncope or syncope, palpitations.     Past Medical History:   Diagnosis Date    A-fib ([facility])     S/P Cardioversion X 2 (2018. 2019)    AAA (abdominal aortic aneurysm) ([facility]) 2013    S/P Endovascular graft repair    Amiodarone pulmonary toxicity 2019    CAD (coronary artery disease)     S/P m RCA 4.0 X 15 mm RYANN (03/22)    Cardiomyopathy ([facility])     LVEF 35(11/20) 25% (07/20) 30% (04/20)    COPD (chronic obstructive pulmonary disease) ([facility])     GERD (gastroesophageal reflux disease)     HTN (hypertension)     Sleep apnea     CPAP     Past Surgical History:   Procedure Laterality Date    CARDIOVERSION ELECTIVE ARRHYTHMIA EXTERNAL N/A 7/20/2020    EP CARDIOVERSION performed by Dmitry Padgett MD at [facility] CARDIAC CATH LAB    CATARACT REMOVAL Bilateral     CERVICAL FUSION      NEUROLOGICAL SURGERY      NH UNLISTED PROCEDURE CARDIAC SURGERY      ENDOGRAFT AORTA    TOTAL KNEE ARTHROPLASTY Right     VASCULAR SURGERY      STENT RENAL ARTERY    XR MIDLINE EQUAL OR GREATER THAN 5 YEARS  8/1/2023    XR MIDLINE EQUAL OR GREATER THAN 5 YEARS 8/1/2023     Current Outpatient Medications   Medication Sig    potassium chloride

## 2024-03-21 ENCOUNTER — OFFICE VISIT (OUTPATIENT)
Age: 89
End: 2024-03-21
Payer: MEDICARE

## 2024-03-21 VITALS
OXYGEN SATURATION: 97 % | WEIGHT: 187.7 LBS | DIASTOLIC BLOOD PRESSURE: 68 MMHG | BODY MASS INDEX: 24.88 KG/M2 | HEART RATE: 107 BPM | RESPIRATION RATE: 14 BRPM | HEIGHT: 73 IN | SYSTOLIC BLOOD PRESSURE: 106 MMHG

## 2024-03-21 DIAGNOSIS — E78.5 HYPERLIPIDEMIA, UNSPECIFIED HYPERLIPIDEMIA TYPE: ICD-10-CM

## 2024-03-21 DIAGNOSIS — R06.09 DYSPNEA ON EXERTION: ICD-10-CM

## 2024-03-21 DIAGNOSIS — R53.83 OTHER FATIGUE: ICD-10-CM

## 2024-03-21 DIAGNOSIS — I25.10 CORONARY ARTERY DISEASE DUE TO LIPID RICH PLAQUE: ICD-10-CM

## 2024-03-21 DIAGNOSIS — I48.20 CHRONIC A-FIB (HCC): Primary | ICD-10-CM

## 2024-03-21 DIAGNOSIS — I25.83 CORONARY ARTERY DISEASE DUE TO LIPID RICH PLAQUE: ICD-10-CM

## 2024-03-21 DIAGNOSIS — I25.5 ISCHEMIC CARDIOMYOPATHY: ICD-10-CM

## 2024-03-21 PROCEDURE — G8484 FLU IMMUNIZE NO ADMIN: HCPCS | Performed by: PHYSICIAN ASSISTANT

## 2024-03-21 PROCEDURE — G8420 CALC BMI NORM PARAMETERS: HCPCS | Performed by: PHYSICIAN ASSISTANT

## 2024-03-21 PROCEDURE — G8427 DOCREV CUR MEDS BY ELIG CLIN: HCPCS | Performed by: PHYSICIAN ASSISTANT

## 2024-03-21 PROCEDURE — 1036F TOBACCO NON-USER: CPT | Performed by: PHYSICIAN ASSISTANT

## 2024-03-21 PROCEDURE — 99214 OFFICE O/P EST MOD 30 MIN: CPT | Performed by: PHYSICIAN ASSISTANT

## 2024-03-21 PROCEDURE — 1123F ACP DISCUSS/DSCN MKR DOCD: CPT | Performed by: PHYSICIAN ASSISTANT

## 2024-03-21 RX ORDER — METOPROLOL SUCCINATE 50 MG/1
50 TABLET, EXTENDED RELEASE ORAL 2 TIMES DAILY
Qty: 60 TABLET | Refills: 5 | Status: SHIPPED | OUTPATIENT
Start: 2024-03-21

## 2024-03-21 NOTE — PATIENT INSTRUCTIONS
Testing   Echo/ Nuclear Stress/ Treadmill Stress/ 24/48 HR Holter    Please call [facility] central scheduling at [phone]/ [facility] central scheduling at [phone] to schedule testing.   Nuclear Stress Instructions-Nothing to eat or drink past midnight and no medicaitons the morning of cardiac testing.  **call office 3-5 days after testing is completed for results** Please ensure testing is completed prior to scheduled follow up appointment. If it is not completed your appointment may be rescheduled**    New Medication/Medication Changes      **please allow 24-48 hrs for medication to be escribed to pharmacy** If you need any refills on medications please contact your pharmacy so that the request can be escribed to the provider for review seven to 10 days prior to being out of medication.    Labs      No appointment required for lab work  *[facility], [address]  ( M - Thur 8am to 3:30pm.) - You must call to make an appointment for blood work at this site.   Contact :[phone]  *[facility] [address] [place] [address]  *[facility], [address] [facility]  *[facility], [address] [facility]  *[facility], [address] [place] [address]      Other Testing/Referral

## 2024-03-21 NOTE — PROGRESS NOTES
Identified pt with two pt identifiers(name and ). Reviewed record in preparation for visit and have obtained necessary documentation.    Jordy Saunders Jr. presents today for SOB       Pt c/o  SOB, CHEST PAIN/ PRESSURE, FATIGUE/WEAKNESS, HEADACHES, SWELLING.             Jordy Saunders Jr. preferred language for health care discussion is english/other.    Personal Protective Equipment:   Personal Protective Equipment was used including: mask-surgical and hands-gloves. Patient was placed on no precaution(s). Patient was masked.    Precautions:   Patient currently on None  Patient currently roomed with door closed.    Is someone accompanying this pt? wife    Is the patient using any DME equipment during OV? yes    Depression Screening:      3/7/2024    10:22 AM 2022    10:38 AM 11/15/2022     3:28 PM 2022     1:31 PM 2022     2:58 PM 2022     1:06 PM 3/15/2022     2:10 PM   PHQ-9 Questionaire   Little interest or pleasure in doing things 0 0 0 0 0 0 0   Feeling down, depressed, or hopeless 0 0 0 0 0 0 0   PHQ-9 Total Score 0 0 0 0 0 0 0        Learning Assessment:  No question data found.    Abuse Screenin/30/2024     2:00 PM 2023     1:00 PM   AMB Abuse Screening   Do you ever feel afraid of your partner? N N   Are you in a relationship with someone who physically or mentally threatens you? N N   Is it safe for you to go home? Y N          Fall Risk      3/7/2024    10:22 AM 2024     2:45 PM   Fall Risk   2 or more falls in past year? no no   Fall with injury in past year? no no         Pt currently taking Anticoagulant therapy? Eliquis  Pt currently taking Antiplatelet therapy? yes    Coordination of Care:  1. Have you been to the ER, urgent care clinic since your last visit? Hospitalized since your last visit? no    2. Have you seen or consulted any other health care providers outside of the [facility] since your last visit? Include any pap smears or colon

## 2024-03-25 ENCOUNTER — HOSPITAL ENCOUNTER (OUTPATIENT)
Facility: HOSPITAL | Age: 89
Setting detail: SPECIMEN
Discharge: HOME | End: 2024-03-28
Payer: MEDICARE

## 2024-03-25 DIAGNOSIS — I25.5 ISCHEMIC CARDIOMYOPATHY: ICD-10-CM

## 2024-03-25 DIAGNOSIS — I48.20 CHRONIC A-FIB (HCC): ICD-10-CM

## 2024-03-25 LAB
ANION GAP SERPL CALC-SCNC: 9 MMOL/L (ref 3–18)
BUN SERPL-MCNC: 36 MG/DL (ref 7–18)
BUN/CREAT SERPL: 27 (ref 12–20)
CALCIUM SERPL-MCNC: 9.7 MG/DL (ref 8.5–10.1)
CHLORIDE SERPL-SCNC: 78 MMOL/L (ref 100–111)
CO2 SERPL-SCNC: 39 MMOL/L (ref 21–32)
CREAT SERPL-MCNC: 1.34 MG/DL (ref 0.6–1.3)
GLUCOSE SERPL-MCNC: 92 MG/DL (ref 74–99)
MAGNESIUM SERPL-MCNC: 2.6 MG/DL (ref 1.6–2.6)
POTASSIUM SERPL-SCNC: 2.9 MMOL/L (ref 3.5–5.5)
SODIUM SERPL-SCNC: 126 MMOL/L (ref 136–145)
T4 FREE SERPL-MCNC: 1.4 NG/DL (ref 0.7–1.5)
TSH SERPL DL<=0.05 MIU/L-ACNC: 3.13 UIU/ML (ref 0.36–3.74)

## 2024-03-25 PROCEDURE — 83735 ASSAY OF MAGNESIUM: CPT

## 2024-03-25 PROCEDURE — 84443 ASSAY THYROID STIM HORMONE: CPT

## 2024-03-25 PROCEDURE — 36415 COLL VENOUS BLD VENIPUNCTURE: CPT

## 2024-03-25 PROCEDURE — 84439 ASSAY OF FREE THYROXINE: CPT

## 2024-03-25 PROCEDURE — 80048 BASIC METABOLIC PNL TOTAL CA: CPT

## 2025-01-17 NOTE — TELEPHONE ENCOUNTER
----- Message from Bertrand Altamirano MD sent at 2/1/2022  3:39 PM EST -----  Please call the patient regarding his abnormal result.
none

## (undated) DEVICE — TRAP MUCUS SPECIMEN 40ML -- MEDICHOICE

## (undated) DEVICE — SINGLE USE BIOPSY VALVE MAJ-210: Brand: SINGLE USE BIOPSY VALVE (STERILE)

## (undated) DEVICE — CATH BLLN DIL 2.5 X12MM RX -- EUPHORA

## (undated) DEVICE — SYR ASSEMB INFL BLLN 60ML --

## (undated) DEVICE — SINGLE USE SUCTION VALVE MAJ-209: Brand: SINGLE USE SUCTION VALVE (STERILE)

## (undated) DEVICE — SET ANGIO L6.5IN L BOR 3 W STPCOCK SPIK TBNG

## (undated) DEVICE — CANNULA ORIG TL CLR W FOAM CUSHIONS AND 14FT SUPL TB 3 CHN

## (undated) DEVICE — BITE BLK ENDOSCP AD 54FR GRN POLYETH ENDOSCP W STRP SLD

## (undated) DEVICE — (D)STOPCOCK IV 4W STD BOR -- DISC BY MFR NO SUB

## (undated) DEVICE — CATHETER ANGIO 5FR L100CM GRY S STL NYL JR4 3 SEG BRAID L

## (undated) DEVICE — SURGICAL PROCEDURE KIT LT HRT CUST

## (undated) DEVICE — 6X9 1.75 MIL 3-W LABGUARD TZ,DISPENS.BOX: Brand: MINIGRIP COMMERCIAL

## (undated) DEVICE — SOL IRRIGATION INJ NACL 0.9% 500ML BTL

## (undated) DEVICE — FLEX ADVANTAGE 1500CC: Brand: FLEX ADVANTAGE

## (undated) DEVICE — GUIDEWIRE VASC L260CM DIA0035IN TIP L3MM PTFE J STD TAPR FIX

## (undated) DEVICE — MEDI-VAC NON-CONDUCTIVE SUCTION TUBING: Brand: CARDINAL HEALTH

## (undated) DEVICE — SYR 50ML SLIP TIP NSAF LF STRL --

## (undated) DEVICE — CATHETER GUID 6FR L100CM COR PERIPH BLU NYL COAT PTFE LNR 67027800] CARDINAL HEALTH CORDIS]

## (undated) DEVICE — GLIDESHEATH SLENDER STAINLESS STEEL KIT: Brand: GLIDESHEATH SLENDER

## (undated) DEVICE — CATH ANGI BLLN DIL 4.0X12MM -- NC EUPHORA

## (undated) DEVICE — ARNDT ENDOBRONCHIAL BLOCKER SET WITH SPHERICAL BALLOON: Brand: ARNDT

## (undated) DEVICE — RADIFOCUS OPTITORQUE ANGIOGRAPHIC CATHETER: Brand: OPTITORQUE

## (undated) DEVICE — ADAPTER TBNG DIA15MM SWVL FBROPT BRONCHSCP TERM 2 AXIS PEEP

## (undated) DEVICE — KENDALL 500 SERIES DIAPHORETIC FOAM MONITORING ELECTRODE - TEAR DROP SHAPE ( 30/PK): Brand: KENDALL

## (undated) DEVICE — SET ADMIN 16ML TBNG L100IN 2 Y INJ SITE IV PIGGY BK DISP

## (undated) DEVICE — COPILOT KIT INCLUDES BLEEDBACK CONTROL VALVE 20/30 INDEFLATOR INFLATION DEVICE 30 ATM 20 CC / GUIDE WIRE INTRODUCER / TORQUE DEVICE: Brand: INDEFLATOR

## (undated) DEVICE — PRESSURE MONITORING SET: Brand: TRUWAVE

## (undated) DEVICE — BAND RADIAL COMPR ARTERY 27CM -- LNG BX/10

## (undated) DEVICE — SYRINGE MED 10ML POLYPR LUERSLIP TIP FLAT TOP W/O SFTY DISP

## (undated) DEVICE — CONMED DISPOSABLE MICROBIOLOGY BRUSH, Ø1 MM, 1.8 MM WORKING DIAMETER, 110 CM LENGTH: Brand: CONMED

## (undated) DEVICE — HI-TORQUE VERSACORE FLOPPY GUIDE WIRE SYSTEM 260 CM: Brand: HI-TORQUE VERSACORE

## (undated) DEVICE — RUNTHROUGH NS EXTRA FLOPPY PTCA GUIDEWIRE: Brand: RUNTHROUGH

## (undated) DEVICE — KIT COLON W/ 1.1OZ LUB AND 2 END

## (undated) DEVICE — BRUSH CYTO L150CM CHN L2MM BRIST DIA1.9MM PTFE S STL BULL